# Patient Record
Sex: MALE | Race: WHITE | Employment: OTHER | URBAN - METROPOLITAN AREA
[De-identification: names, ages, dates, MRNs, and addresses within clinical notes are randomized per-mention and may not be internally consistent; named-entity substitution may affect disease eponyms.]

---

## 2018-04-26 PROBLEM — E66.01 MORBID OBESITY (HCC): Status: ACTIVE | Noted: 2018-04-26

## 2018-06-06 ENCOUNTER — HOSPITAL ENCOUNTER (OUTPATIENT)
Dept: PREADMISSION TESTING | Age: 58
Discharge: HOME | End: 2018-06-10
Payer: COMMERCIAL

## 2018-06-06 VITALS
WEIGHT: 315 LBS | TEMPERATURE: 97.5 F | OXYGEN SATURATION: 96 % | SYSTOLIC BLOOD PRESSURE: 144 MMHG | HEART RATE: 80 BPM | RESPIRATION RATE: 16 BRPM | BODY MASS INDEX: 37.19 KG/M2 | HEIGHT: 77 IN | DIASTOLIC BLOOD PRESSURE: 93 MMHG

## 2018-06-06 LAB
ABSOLUTE EOS #: 0.2 K/UL (ref 0–0.4)
ABSOLUTE IMMATURE GRANULOCYTE: ABNORMAL K/UL (ref 0–0.3)
ABSOLUTE LYMPH #: 3.1 K/UL (ref 1–4.8)
ABSOLUTE MONO #: 0.8 K/UL (ref 0.1–1.3)
ANION GAP SERPL CALCULATED.3IONS-SCNC: 12 MMOL/L (ref 9–17)
BASOPHILS # BLD: 0 % (ref 0–2)
BASOPHILS ABSOLUTE: 0 K/UL (ref 0–0.2)
BUN BLDV-MCNC: 14 MG/DL (ref 6–20)
BUN/CREAT BLD: ABNORMAL (ref 9–20)
CALCIUM SERPL-MCNC: 8.8 MG/DL (ref 8.6–10.4)
CHLORIDE BLD-SCNC: 101 MMOL/L (ref 98–107)
CO2: 26 MMOL/L (ref 20–31)
CREAT SERPL-MCNC: 0.76 MG/DL (ref 0.7–1.2)
DIFFERENTIAL TYPE: ABNORMAL
EKG ATRIAL RATE: 76 BPM
EKG P AXIS: -3 DEGREES
EKG P-R INTERVAL: 170 MS
EKG Q-T INTERVAL: 366 MS
EKG QRS DURATION: 94 MS
EKG QTC CALCULATION (BAZETT): 411 MS
EKG R AXIS: -2 DEGREES
EKG T AXIS: 16 DEGREES
EKG VENTRICULAR RATE: 76 BPM
EOSINOPHILS RELATIVE PERCENT: 2 % (ref 0–4)
GFR AFRICAN AMERICAN: >60 ML/MIN
GFR NON-AFRICAN AMERICAN: >60 ML/MIN
GFR SERPL CREATININE-BSD FRML MDRD: ABNORMAL ML/MIN/{1.73_M2}
GFR SERPL CREATININE-BSD FRML MDRD: ABNORMAL ML/MIN/{1.73_M2}
GLUCOSE BLD-MCNC: 108 MG/DL (ref 70–99)
HCT VFR BLD CALC: 41.1 % (ref 41–53)
HEMOGLOBIN: 14.2 G/DL (ref 13.5–17.5)
IMMATURE GRANULOCYTES: ABNORMAL %
LYMPHOCYTES # BLD: 31 % (ref 24–44)
MCH RBC QN AUTO: 30.4 PG (ref 26–34)
MCHC RBC AUTO-ENTMCNC: 34.4 G/DL (ref 31–37)
MCV RBC AUTO: 88.2 FL (ref 80–100)
MONOCYTES # BLD: 9 % (ref 1–7)
NRBC AUTOMATED: ABNORMAL PER 100 WBC
PDW BLD-RTO: 13.6 % (ref 11.5–14.9)
PLATELET # BLD: 246 K/UL (ref 150–450)
PLATELET ESTIMATE: ABNORMAL
PMV BLD AUTO: 8.9 FL (ref 6–12)
POTASSIUM SERPL-SCNC: 4.2 MMOL/L (ref 3.7–5.3)
RBC # BLD: 4.66 M/UL (ref 4.5–5.9)
RBC # BLD: ABNORMAL 10*6/UL
SEG NEUTROPHILS: 58 % (ref 36–66)
SEGMENTED NEUTROPHILS ABSOLUTE COUNT: 5.7 K/UL (ref 1.3–9.1)
SODIUM BLD-SCNC: 139 MMOL/L (ref 135–144)
WBC # BLD: 9.8 K/UL (ref 3.5–11)
WBC # BLD: ABNORMAL 10*3/UL

## 2018-06-06 PROCEDURE — 85025 COMPLETE CBC W/AUTO DIFF WBC: CPT

## 2018-06-06 PROCEDURE — 36415 COLL VENOUS BLD VENIPUNCTURE: CPT

## 2018-06-06 PROCEDURE — 80048 BASIC METABOLIC PNL TOTAL CA: CPT

## 2018-06-06 PROCEDURE — 93005 ELECTROCARDIOGRAM TRACING: CPT

## 2018-06-07 ENCOUNTER — ANESTHESIA EVENT (OUTPATIENT)
Dept: OPERATING ROOM | Age: 58
End: 2018-06-07
Payer: COMMERCIAL

## 2018-06-07 RX ORDER — LIDOCAINE HYDROCHLORIDE 10 MG/ML
1 INJECTION, SOLUTION EPIDURAL; INFILTRATION; INTRACAUDAL; PERINEURAL
Status: CANCELLED | OUTPATIENT
Start: 2018-06-07 | End: 2018-06-07

## 2018-06-07 RX ORDER — SODIUM CHLORIDE 0.9 % (FLUSH) 0.9 %
10 SYRINGE (ML) INJECTION PRN
Status: CANCELLED | OUTPATIENT
Start: 2018-06-07

## 2018-06-07 RX ORDER — SODIUM CHLORIDE 0.9 % (FLUSH) 0.9 %
10 SYRINGE (ML) INJECTION EVERY 12 HOURS SCHEDULED
Status: CANCELLED | OUTPATIENT
Start: 2018-06-07

## 2018-06-07 RX ORDER — SODIUM CHLORIDE 9 MG/ML
INJECTION, SOLUTION INTRAVENOUS CONTINUOUS
Status: CANCELLED | OUTPATIENT
Start: 2018-06-07

## 2018-06-13 ENCOUNTER — HOSPITAL ENCOUNTER (OUTPATIENT)
Age: 58
Setting detail: OUTPATIENT SURGERY
Discharge: HOME | End: 2018-06-13
Attending: SURGERY | Admitting: SURGERY
Payer: COMMERCIAL

## 2018-06-13 ENCOUNTER — ANESTHESIA (OUTPATIENT)
Dept: OPERATING ROOM | Age: 58
End: 2018-06-13
Payer: COMMERCIAL

## 2018-06-13 VITALS — TEMPERATURE: 97 F | OXYGEN SATURATION: 85 % | SYSTOLIC BLOOD PRESSURE: 124 MMHG | DIASTOLIC BLOOD PRESSURE: 77 MMHG

## 2018-06-13 VITALS
BODY MASS INDEX: 37.19 KG/M2 | SYSTOLIC BLOOD PRESSURE: 140 MMHG | DIASTOLIC BLOOD PRESSURE: 83 MMHG | WEIGHT: 315 LBS | HEART RATE: 75 BPM | TEMPERATURE: 97.2 F | RESPIRATION RATE: 12 BRPM | HEIGHT: 77 IN | OXYGEN SATURATION: 94 %

## 2018-06-13 DIAGNOSIS — K42.9 UMBILICAL HERNIA WITHOUT OBSTRUCTION AND WITHOUT GANGRENE: Primary | ICD-10-CM

## 2018-06-13 LAB — GLUCOSE BLD-MCNC: 125 MG/DL (ref 75–110)

## 2018-06-13 PROCEDURE — 6370000000 HC RX 637 (ALT 250 FOR IP): Performed by: ANESTHESIOLOGY

## 2018-06-13 PROCEDURE — 2720000010 HC SURG SUPPLY STERILE: Performed by: SURGERY

## 2018-06-13 PROCEDURE — 6360000002 HC RX W HCPCS

## 2018-06-13 PROCEDURE — 88307 TISSUE EXAM BY PATHOLOGIST: CPT

## 2018-06-13 PROCEDURE — 7100000000 HC PACU RECOVERY - FIRST 15 MIN: Performed by: SURGERY

## 2018-06-13 PROCEDURE — 3600000012 HC SURGERY LEVEL 2 ADDTL 15MIN: Performed by: SURGERY

## 2018-06-13 PROCEDURE — 88302 TISSUE EXAM BY PATHOLOGIST: CPT

## 2018-06-13 PROCEDURE — 2500000003 HC RX 250 WO HCPCS: Performed by: SURGERY

## 2018-06-13 PROCEDURE — 2580000003 HC RX 258: Performed by: ANESTHESIOLOGY

## 2018-06-13 PROCEDURE — 2500000003 HC RX 250 WO HCPCS: Performed by: ANESTHESIOLOGY

## 2018-06-13 PROCEDURE — 6360000002 HC RX W HCPCS: Performed by: ANESTHESIOLOGY

## 2018-06-13 PROCEDURE — 3700000001 HC ADD 15 MINUTES (ANESTHESIA): Performed by: SURGERY

## 2018-06-13 PROCEDURE — 82947 ASSAY GLUCOSE BLOOD QUANT: CPT

## 2018-06-13 PROCEDURE — 7100000001 HC PACU RECOVERY - ADDTL 15 MIN: Performed by: SURGERY

## 2018-06-13 PROCEDURE — 2700000000 HC OXYGEN THERAPY PER DAY

## 2018-06-13 PROCEDURE — 7100000031 HC ASPR PHASE II RECOVERY - ADDTL 15 MIN: Performed by: SURGERY

## 2018-06-13 PROCEDURE — 2500000003 HC RX 250 WO HCPCS

## 2018-06-13 PROCEDURE — A6402 STERILE GAUZE <= 16 SQ IN: HCPCS | Performed by: SURGERY

## 2018-06-13 PROCEDURE — 7100000030 HC ASPR PHASE II RECOVERY - FIRST 15 MIN: Performed by: SURGERY

## 2018-06-13 PROCEDURE — C1781 MESH (IMPLANTABLE): HCPCS | Performed by: SURGERY

## 2018-06-13 PROCEDURE — 3700000000 HC ANESTHESIA ATTENDED CARE: Performed by: SURGERY

## 2018-06-13 PROCEDURE — S0028 INJECTION, FAMOTIDINE, 20 MG: HCPCS | Performed by: ANESTHESIOLOGY

## 2018-06-13 PROCEDURE — 3600000002 HC SURGERY LEVEL 2 BASE: Performed by: SURGERY

## 2018-06-13 DEVICE — PATCH HERN M DIA2.5IN CIR W/ STRP SEPRA TECHNOLOGY ABSRB: Type: IMPLANTABLE DEVICE | Site: UMBILICAL | Status: FUNCTIONAL

## 2018-06-13 RX ORDER — DEXAMETHASONE SODIUM PHOSPHATE 4 MG/ML
INJECTION, SOLUTION INTRA-ARTICULAR; INTRALESIONAL; INTRAMUSCULAR; INTRAVENOUS; SOFT TISSUE PRN
Status: DISCONTINUED | OUTPATIENT
Start: 2018-06-13 | End: 2018-06-13 | Stop reason: SDUPTHER

## 2018-06-13 RX ORDER — DIPHENHYDRAMINE HYDROCHLORIDE 50 MG/ML
12.5 INJECTION INTRAMUSCULAR; INTRAVENOUS
Status: DISCONTINUED | OUTPATIENT
Start: 2018-06-13 | End: 2018-06-13 | Stop reason: HOSPADM

## 2018-06-13 RX ORDER — ONDANSETRON 2 MG/ML
4 INJECTION INTRAMUSCULAR; INTRAVENOUS
Status: COMPLETED | OUTPATIENT
Start: 2018-06-13 | End: 2018-06-13

## 2018-06-13 RX ORDER — OXYCODONE HYDROCHLORIDE AND ACETAMINOPHEN 5; 325 MG/1; MG/1
1 TABLET ORAL EVERY 6 HOURS PRN
Qty: 28 TABLET | Refills: 0 | Status: SHIPPED | OUTPATIENT
Start: 2018-06-13 | End: 2018-06-20

## 2018-06-13 RX ORDER — NEOSTIGMINE METHYLSULFATE 1 MG/ML
INJECTION, SOLUTION INTRAVENOUS PRN
Status: DISCONTINUED | OUTPATIENT
Start: 2018-06-13 | End: 2018-06-13 | Stop reason: SDUPTHER

## 2018-06-13 RX ORDER — CEPHALEXIN 500 MG/1
CAPSULE ORAL
Qty: 21 CAPSULE | Refills: 0 | Status: SHIPPED | OUTPATIENT
Start: 2018-06-13 | End: 2018-11-05

## 2018-06-13 RX ORDER — MEPERIDINE HYDROCHLORIDE 50 MG/ML
12.5 INJECTION INTRAMUSCULAR; INTRAVENOUS; SUBCUTANEOUS EVERY 5 MIN PRN
Status: DISCONTINUED | OUTPATIENT
Start: 2018-06-13 | End: 2018-06-13 | Stop reason: HOSPADM

## 2018-06-13 RX ORDER — MIDAZOLAM HYDROCHLORIDE 1 MG/ML
INJECTION INTRAMUSCULAR; INTRAVENOUS PRN
Status: DISCONTINUED | OUTPATIENT
Start: 2018-06-13 | End: 2018-06-13 | Stop reason: SDUPTHER

## 2018-06-13 RX ORDER — BUPIVACAINE HYDROCHLORIDE 2.5 MG/ML
INJECTION, SOLUTION EPIDURAL; INFILTRATION; INTRACAUDAL PRN
Status: DISCONTINUED | OUTPATIENT
Start: 2018-06-13 | End: 2018-06-13 | Stop reason: HOSPADM

## 2018-06-13 RX ORDER — PROPOFOL 10 MG/ML
INJECTION, EMULSION INTRAVENOUS PRN
Status: DISCONTINUED | OUTPATIENT
Start: 2018-06-13 | End: 2018-06-13 | Stop reason: SDUPTHER

## 2018-06-13 RX ORDER — ROCURONIUM BROMIDE 10 MG/ML
INJECTION, SOLUTION INTRAVENOUS PRN
Status: DISCONTINUED | OUTPATIENT
Start: 2018-06-13 | End: 2018-06-13 | Stop reason: SDUPTHER

## 2018-06-13 RX ORDER — FENTANYL CITRATE 50 UG/ML
25 INJECTION, SOLUTION INTRAMUSCULAR; INTRAVENOUS EVERY 5 MIN PRN
Status: DISCONTINUED | OUTPATIENT
Start: 2018-06-13 | End: 2018-06-13 | Stop reason: HOSPADM

## 2018-06-13 RX ORDER — CEFAZOLIN SODIUM 1 G/3ML
INJECTION, POWDER, FOR SOLUTION INTRAMUSCULAR; INTRAVENOUS PRN
Status: DISCONTINUED | OUTPATIENT
Start: 2018-06-13 | End: 2018-06-13 | Stop reason: SDUPTHER

## 2018-06-13 RX ORDER — HYDRALAZINE HYDROCHLORIDE 20 MG/ML
5 INJECTION INTRAMUSCULAR; INTRAVENOUS EVERY 10 MIN PRN
Status: DISCONTINUED | OUTPATIENT
Start: 2018-06-13 | End: 2018-06-13 | Stop reason: HOSPADM

## 2018-06-13 RX ORDER — CEFAZOLIN SODIUM 1 G/3ML
INJECTION, POWDER, FOR SOLUTION INTRAMUSCULAR; INTRAVENOUS
Status: DISCONTINUED
Start: 2018-06-13 | End: 2018-06-13 | Stop reason: HOSPADM

## 2018-06-13 RX ORDER — FENTANYL CITRATE 50 UG/ML
INJECTION, SOLUTION INTRAMUSCULAR; INTRAVENOUS PRN
Status: DISCONTINUED | OUTPATIENT
Start: 2018-06-13 | End: 2018-06-13 | Stop reason: SDUPTHER

## 2018-06-13 RX ORDER — SODIUM CHLORIDE 0.9 % (FLUSH) 0.9 %
10 SYRINGE (ML) INJECTION EVERY 12 HOURS SCHEDULED
Status: DISCONTINUED | OUTPATIENT
Start: 2018-06-13 | End: 2018-06-13 | Stop reason: HOSPADM

## 2018-06-13 RX ORDER — LABETALOL HYDROCHLORIDE 5 MG/ML
5 INJECTION, SOLUTION INTRAVENOUS EVERY 10 MIN PRN
Status: DISCONTINUED | OUTPATIENT
Start: 2018-06-13 | End: 2018-06-13 | Stop reason: HOSPADM

## 2018-06-13 RX ORDER — FENTANYL CITRATE 50 UG/ML
50 INJECTION, SOLUTION INTRAMUSCULAR; INTRAVENOUS EVERY 5 MIN PRN
Status: DISCONTINUED | OUTPATIENT
Start: 2018-06-13 | End: 2018-06-13 | Stop reason: HOSPADM

## 2018-06-13 RX ORDER — ONDANSETRON 4 MG/1
TABLET, FILM COATED ORAL
Qty: 20 TABLET | Refills: 0 | Status: SHIPPED | OUTPATIENT
Start: 2018-06-13 | End: 2018-11-05

## 2018-06-13 RX ORDER — METOCLOPRAMIDE HYDROCHLORIDE 5 MG/ML
10 INJECTION INTRAMUSCULAR; INTRAVENOUS ONCE
Status: COMPLETED | OUTPATIENT
Start: 2018-06-13 | End: 2018-06-13

## 2018-06-13 RX ORDER — SULFAMETHOXAZOLE AND TRIMETHOPRIM 800; 160 MG/1; MG/1
1 TABLET ORAL 2 TIMES DAILY
Qty: 20 TABLET | Refills: 0 | Status: SHIPPED | OUTPATIENT
Start: 2018-06-13 | End: 2018-06-23

## 2018-06-13 RX ORDER — METOCLOPRAMIDE HYDROCHLORIDE 5 MG/ML
10 INJECTION INTRAMUSCULAR; INTRAVENOUS
Status: DISCONTINUED | OUTPATIENT
Start: 2018-06-13 | End: 2018-06-13 | Stop reason: HOSPADM

## 2018-06-13 RX ORDER — SODIUM CHLORIDE 9 MG/ML
INJECTION, SOLUTION INTRAVENOUS CONTINUOUS
Status: DISCONTINUED | OUTPATIENT
Start: 2018-06-13 | End: 2018-06-13 | Stop reason: HOSPADM

## 2018-06-13 RX ORDER — SODIUM CHLORIDE 0.9 % (FLUSH) 0.9 %
10 SYRINGE (ML) INJECTION PRN
Status: DISCONTINUED | OUTPATIENT
Start: 2018-06-13 | End: 2018-06-13 | Stop reason: HOSPADM

## 2018-06-13 RX ORDER — HYDROCODONE BITARTRATE AND ACETAMINOPHEN 5; 325 MG/1; MG/1
2 TABLET ORAL PRN
Status: COMPLETED | OUTPATIENT
Start: 2018-06-13 | End: 2018-06-13

## 2018-06-13 RX ORDER — GLYCOPYRROLATE 1 MG/5 ML
SYRINGE (ML) INTRAVENOUS PRN
Status: DISCONTINUED | OUTPATIENT
Start: 2018-06-13 | End: 2018-06-13 | Stop reason: SDUPTHER

## 2018-06-13 RX ORDER — LIDOCAINE HYDROCHLORIDE 10 MG/ML
1 INJECTION, SOLUTION EPIDURAL; INFILTRATION; INTRACAUDAL; PERINEURAL
Status: DISCONTINUED | OUTPATIENT
Start: 2018-06-13 | End: 2018-06-13 | Stop reason: HOSPADM

## 2018-06-13 RX ORDER — HYDROCODONE BITARTRATE AND ACETAMINOPHEN 5; 325 MG/1; MG/1
1 TABLET ORAL PRN
Status: COMPLETED | OUTPATIENT
Start: 2018-06-13 | End: 2018-06-13

## 2018-06-13 RX ORDER — MORPHINE SULFATE 2 MG/ML
2 INJECTION, SOLUTION INTRAMUSCULAR; INTRAVENOUS EVERY 5 MIN PRN
Status: DISCONTINUED | OUTPATIENT
Start: 2018-06-13 | End: 2018-06-13 | Stop reason: HOSPADM

## 2018-06-13 RX ORDER — ONDANSETRON 2 MG/ML
INJECTION INTRAMUSCULAR; INTRAVENOUS PRN
Status: DISCONTINUED | OUTPATIENT
Start: 2018-06-13 | End: 2018-06-13 | Stop reason: SDUPTHER

## 2018-06-13 RX ADMIN — DEXAMETHASONE SODIUM PHOSPHATE 4 MG: 4 INJECTION, SOLUTION INTRAMUSCULAR; INTRAVENOUS at 14:37

## 2018-06-13 RX ADMIN — SODIUM CHLORIDE: 9 INJECTION, SOLUTION INTRAVENOUS at 13:17

## 2018-06-13 RX ADMIN — FENTANYL CITRATE 50 MCG: 50 INJECTION, SOLUTION INTRAMUSCULAR; INTRAVENOUS at 15:32

## 2018-06-13 RX ADMIN — ONDANSETRON 4 MG: 2 INJECTION INTRAMUSCULAR; INTRAVENOUS at 16:05

## 2018-06-13 RX ADMIN — METOCLOPRAMIDE 10 MG: 5 INJECTION, SOLUTION INTRAMUSCULAR; INTRAVENOUS at 13:53

## 2018-06-13 RX ADMIN — HYDROCODONE BITARTRATE AND ACETAMINOPHEN 2 TABLET: 5; 325 TABLET ORAL at 16:58

## 2018-06-13 RX ADMIN — PROPOFOL 200 MG: 10 INJECTION, EMULSION INTRAVENOUS at 14:24

## 2018-06-13 RX ADMIN — HYDROMORPHONE HYDROCHLORIDE 0.5 MG: 1 INJECTION, SOLUTION INTRAMUSCULAR; INTRAVENOUS; SUBCUTANEOUS at 16:12

## 2018-06-13 RX ADMIN — NEOSTIGMINE METHYLSULFATE 5 MG: 1 INJECTION, SOLUTION INTRAVENOUS at 15:23

## 2018-06-13 RX ADMIN — MIDAZOLAM 2 MG: 1 INJECTION INTRAMUSCULAR; INTRAVENOUS at 14:18

## 2018-06-13 RX ADMIN — HYDROMORPHONE HYDROCHLORIDE 0.5 MG: 1 INJECTION, SOLUTION INTRAMUSCULAR; INTRAVENOUS; SUBCUTANEOUS at 16:02

## 2018-06-13 RX ADMIN — ROCURONIUM BROMIDE 50 MG: 10 INJECTION INTRAVENOUS at 14:24

## 2018-06-13 RX ADMIN — FENTANYL CITRATE 100 MCG: 50 INJECTION, SOLUTION INTRAMUSCULAR; INTRAVENOUS at 14:47

## 2018-06-13 RX ADMIN — CEFAZOLIN 3000 MG: 1 INJECTION, POWDER, FOR SOLUTION INTRAMUSCULAR; INTRAVENOUS at 14:25

## 2018-06-13 RX ADMIN — Medication 0.8 MG: at 15:23

## 2018-06-13 RX ADMIN — ONDANSETRON 4 MG: 2 INJECTION INTRAMUSCULAR; INTRAVENOUS at 14:37

## 2018-06-13 RX ADMIN — FAMOTIDINE 20 MG: 10 INJECTION INTRAVENOUS at 13:56

## 2018-06-13 RX ADMIN — FENTANYL CITRATE 100 MCG: 50 INJECTION, SOLUTION INTRAMUSCULAR; INTRAVENOUS at 14:24

## 2018-06-13 RX ADMIN — ROCURONIUM BROMIDE 10 MG: 10 INJECTION INTRAVENOUS at 14:58

## 2018-06-13 RX ADMIN — SODIUM CHLORIDE: 9 INJECTION, SOLUTION INTRAVENOUS at 14:53

## 2018-06-13 ASSESSMENT — PULMONARY FUNCTION TESTS
PIF_VALUE: 29
PIF_VALUE: 28
PIF_VALUE: 27
PIF_VALUE: 29
PIF_VALUE: 31
PIF_VALUE: 29
PIF_VALUE: 0
PIF_VALUE: 0
PIF_VALUE: 29
PIF_VALUE: 1
PIF_VALUE: 1
PIF_VALUE: 30
PIF_VALUE: 29
PIF_VALUE: 33
PIF_VALUE: 29
PIF_VALUE: 1
PIF_VALUE: 28
PIF_VALUE: 29
PIF_VALUE: 29
PIF_VALUE: 28
PIF_VALUE: 30
PIF_VALUE: 28
PIF_VALUE: 30
PIF_VALUE: 29
PIF_VALUE: 0
PIF_VALUE: 28
PIF_VALUE: 25
PIF_VALUE: 28
PIF_VALUE: 30
PIF_VALUE: 28
PIF_VALUE: 29
PIF_VALUE: 28
PIF_VALUE: 29
PIF_VALUE: 2
PIF_VALUE: 29
PIF_VALUE: 3
PIF_VALUE: 27
PIF_VALUE: 29
PIF_VALUE: 28
PIF_VALUE: 27
PIF_VALUE: 29
PIF_VALUE: 28
PIF_VALUE: 30
PIF_VALUE: 28
PIF_VALUE: 1
PIF_VALUE: 29
PIF_VALUE: 29
PIF_VALUE: 1
PIF_VALUE: 29
PIF_VALUE: 3
PIF_VALUE: 29
PIF_VALUE: 25
PIF_VALUE: 29
PIF_VALUE: 1
PIF_VALUE: 28
PIF_VALUE: 35
PIF_VALUE: 29
PIF_VALUE: 26
PIF_VALUE: 28
PIF_VALUE: 29
PIF_VALUE: 29
PIF_VALUE: 1
PIF_VALUE: 29
PIF_VALUE: 3
PIF_VALUE: 2
PIF_VALUE: 3
PIF_VALUE: 31
PIF_VALUE: 27
PIF_VALUE: 29
PIF_VALUE: 28

## 2018-06-13 ASSESSMENT — PAIN DESCRIPTION - PAIN TYPE
TYPE: SURGICAL PAIN
TYPE: SURGICAL PAIN

## 2018-06-13 ASSESSMENT — PAIN SCALES - GENERAL
PAINLEVEL_OUTOF10: 7
PAINLEVEL_OUTOF10: 5
PAINLEVEL_OUTOF10: 5
PAINLEVEL_OUTOF10: 7
PAINLEVEL_OUTOF10: 7
PAINLEVEL_OUTOF10: 8
PAINLEVEL_OUTOF10: 0

## 2018-06-13 ASSESSMENT — PAIN DESCRIPTION - LOCATION
LOCATION: ABDOMEN

## 2018-06-13 ASSESSMENT — PAIN - FUNCTIONAL ASSESSMENT: PAIN_FUNCTIONAL_ASSESSMENT: 0-10

## 2018-06-13 ASSESSMENT — PAIN DESCRIPTION - DESCRIPTORS
DESCRIPTORS: ACHING
DESCRIPTORS: ACHING;BURNING

## 2018-06-15 LAB — SURGICAL PATHOLOGY REPORT: NORMAL

## 2018-11-05 PROBLEM — E66.9 DIABETES MELLITUS TYPE 2 IN OBESE (HCC): Status: ACTIVE | Noted: 2018-11-05

## 2018-11-05 PROBLEM — E11.69 DIABETES MELLITUS TYPE 2 IN OBESE (HCC): Status: ACTIVE | Noted: 2018-11-05

## 2018-12-26 DIAGNOSIS — E66.9 DIABETES MELLITUS TYPE 2 IN OBESE (HCC): ICD-10-CM

## 2018-12-26 DIAGNOSIS — E11.69 DIABETES MELLITUS TYPE 2 IN OBESE (HCC): ICD-10-CM

## 2018-12-26 RX ORDER — LANCETS 30 GAUGE
1 EACH MISCELLANEOUS 2 TIMES DAILY
Qty: 100 EACH | Refills: 5 | Status: SHIPPED | OUTPATIENT
Start: 2018-12-26 | End: 2018-12-26 | Stop reason: SDUPTHER

## 2018-12-26 RX ORDER — LANCETS 30 GAUGE
1 EACH MISCELLANEOUS 2 TIMES DAILY
Qty: 300 EACH | Refills: 3 | Status: SHIPPED | OUTPATIENT
Start: 2018-12-26 | End: 2020-12-10

## 2019-05-07 ENCOUNTER — OFFICE VISIT (OUTPATIENT)
Dept: PRIMARY CARE CLINIC | Age: 59
End: 2019-05-07
Payer: COMMERCIAL

## 2019-05-07 VITALS
HEART RATE: 78 BPM | WEIGHT: 315 LBS | BODY MASS INDEX: 37.19 KG/M2 | SYSTOLIC BLOOD PRESSURE: 134 MMHG | HEIGHT: 77 IN | OXYGEN SATURATION: 96 % | DIASTOLIC BLOOD PRESSURE: 86 MMHG

## 2019-05-07 DIAGNOSIS — Z23 NEED FOR VIRAL IMMUNIZATION: ICD-10-CM

## 2019-05-07 DIAGNOSIS — E66.9 DIABETES MELLITUS TYPE 2 IN OBESE (HCC): Primary | ICD-10-CM

## 2019-05-07 DIAGNOSIS — J20.9 ACUTE BRONCHITIS, UNSPECIFIED ORGANISM: ICD-10-CM

## 2019-05-07 DIAGNOSIS — C67.8 MALIGNANT NEOPLASM OF OVERLAPPING SITES OF BLADDER (HCC): ICD-10-CM

## 2019-05-07 DIAGNOSIS — E11.69 DIABETES MELLITUS TYPE 2 IN OBESE (HCC): Primary | ICD-10-CM

## 2019-05-07 LAB — HBA1C MFR BLD: 6.1 %

## 2019-05-07 PROCEDURE — 99213 OFFICE O/P EST LOW 20 MIN: CPT | Performed by: FAMILY MEDICINE

## 2019-05-07 PROCEDURE — 90715 TDAP VACCINE 7 YRS/> IM: CPT | Performed by: FAMILY MEDICINE

## 2019-05-07 PROCEDURE — 83036 HEMOGLOBIN GLYCOSYLATED A1C: CPT | Performed by: FAMILY MEDICINE

## 2019-05-07 PROCEDURE — 90471 IMMUNIZATION ADMIN: CPT | Performed by: FAMILY MEDICINE

## 2019-05-07 RX ORDER — ALBUTEROL SULFATE 90 UG/1
2 AEROSOL, METERED RESPIRATORY (INHALATION) EVERY 6 HOURS PRN
Qty: 1 INHALER | Refills: 5 | Status: SHIPPED | OUTPATIENT
Start: 2019-05-07 | End: 2021-10-28 | Stop reason: SDUPTHER

## 2019-05-07 ASSESSMENT — ENCOUNTER SYMPTOMS
SHORTNESS OF BREATH: 0
RHINORRHEA: 0
WHEEZING: 0
DIARRHEA: 0
ABDOMINAL PAIN: 0
SORE THROAT: 0
VOMITING: 0
EYE DISCHARGE: 0
EYE REDNESS: 0
NAUSEA: 0
COUGH: 0

## 2019-05-07 ASSESSMENT — PATIENT HEALTH QUESTIONNAIRE - PHQ9
SUM OF ALL RESPONSES TO PHQ QUESTIONS 1-9: 0
SUM OF ALL RESPONSES TO PHQ QUESTIONS 1-9: 0
2. FEELING DOWN, DEPRESSED OR HOPELESS: 0
1. LITTLE INTEREST OR PLEASURE IN DOING THINGS: 0
SUM OF ALL RESPONSES TO PHQ9 QUESTIONS 1 & 2: 0

## 2019-05-07 NOTE — PROGRESS NOTES
 Alcohol use: Yes     Types: 1 Cans of beer per week     Comment: occasional      Current Outpatient Medications   Medication Sig Dispense Refill    Coenzyme Q10 (CO Q 10) 100 MG CAPS Take by mouth      albuterol sulfate HFA (PROAIR HFA) 108 (90 Base) MCG/ACT inhaler Inhale 2 puffs into the lungs every 6 hours as needed for Wheezing or Shortness of Breath 1 Inhaler 5    Lancets MISC 1 each by Does not apply route 2 times daily 300 each 3    carvedilol (COREG) 25 MG tablet Take 1 tablet by mouth 2 times daily 180 tablet 3    metFORMIN (GLUCOPHAGE) 500 MG tablet Take 1 tablet by mouth daily (with breakfast) 90 tablet 3    aspirin 81 MG tablet Take 81 mg by mouth daily.  Multiple Vitamins-Minerals (MULTIVITAMIN & MINERAL PO) Take 1 tablet by mouth daily. No current facility-administered medications for this visit. No Known Allergies    Health Maintenance   Topic Date Due    Diabetic retinal exam  02/02/1969    HIV screen  02/02/1974    Shingles Vaccine (1 of 2) 02/02/2009    Lipid screen  04/27/2019    Potassium monitoring  06/06/2019    Creatinine monitoring  06/06/2019    Diabetic foot exam  11/05/2019    Diabetic microalbuminuria test  11/05/2019    A1C test (Diabetic or Prediabetic)  05/07/2020    Colon cancer screen colonoscopy  10/13/2026    DTaP/Tdap/Td vaccine (4 - Td) 05/07/2029    Flu vaccine  Completed    Pneumococcal 0-64 years Vaccine  Completed    Hepatitis C screen  Completed       Subjective:      Review of Systems   Constitutional: Negative for chills and fever. HENT: Negative for rhinorrhea and sore throat. Eyes: Negative for discharge and redness. Respiratory: Negative for cough, shortness of breath and wheezing. Cardiovascular: Negative for chest pain and palpitations. Gastrointestinal: Negative for abdominal pain, diarrhea, nausea and vomiting. Genitourinary: Negative for dysuria and frequency.    Musculoskeletal: Negative for arthralgias and Referral Priority:   Routine     Referral Type:   Eval and Treat     Referral Reason:   Specialty Services Required     Referred to Provider:   Josiane Cardenas MD     Requested Specialty:   Urology     Number of Visits Requested:   1    POCT glycosylated hemoglobin (Hb A1C)    [place] COLLECTION CAPILLARY BLOOD SPECIMEN     Orders Placed This Encounter   Medications    albuterol sulfate HFA (PROAIR HFA) 108 (90 Base) MCG/ACT inhaler     Sig: Inhale 2 puffs into the lungs every 6 hours as needed for Wheezing or Shortness of Breath     Dispense:  1 Inhaler     Refill:  5       Patient given educationalmaterials - see patient instructions. Discussed use, benefit, and side effectsof prescribed medications. All patient questions answered. Pt voiced understanding. Reviewed health maintenance. Instructed to continue current medications, diet andexercise. Patient agreed with treatment plan. Follow up as directed.      Electronicallysigned by Nini Perez MD on 5/7/2019 at 5:00 PM

## 2019-08-22 DIAGNOSIS — I10 ESSENTIAL HYPERTENSION: ICD-10-CM

## 2019-08-23 RX ORDER — CARVEDILOL 25 MG/1
TABLET ORAL
Qty: 60 TABLET | Refills: 3 | Status: SHIPPED | OUTPATIENT
Start: 2019-08-23 | End: 2019-12-23

## 2019-08-27 ENCOUNTER — HOSPITAL ENCOUNTER (OUTPATIENT)
Age: 59
Setting detail: OUTPATIENT SURGERY
Discharge: HOME | End: 2019-08-27
Attending: UROLOGY | Admitting: UROLOGY
Payer: COMMERCIAL

## 2019-08-27 VITALS
OXYGEN SATURATION: 97 % | WEIGHT: 315 LBS | HEIGHT: 78 IN | SYSTOLIC BLOOD PRESSURE: 139 MMHG | RESPIRATION RATE: 14 BRPM | BODY MASS INDEX: 36.45 KG/M2 | HEART RATE: 65 BPM | TEMPERATURE: 98 F | DIASTOLIC BLOOD PRESSURE: 83 MMHG

## 2019-08-27 LAB
BILIRUBIN URINE: NEGATIVE
COLOR: YELLOW
COMMENT UA: NORMAL
GLUCOSE BLD-MCNC: 126 MG/DL (ref 75–110)
GLUCOSE URINE: NEGATIVE
KETONES, URINE: NEGATIVE
LEUKOCYTE ESTERASE, URINE: NEGATIVE
NITRITE, URINE: NEGATIVE
PH UA: 6 (ref 5–8)
PROTEIN UA: NEGATIVE
SPECIFIC GRAVITY UA: 1.02 (ref 1–1.03)
TURBIDITY: CLEAR
URINE HGB: NEGATIVE
UROBILINOGEN, URINE: NORMAL

## 2019-08-27 PROCEDURE — 3600000002 HC SURGERY LEVEL 2 BASE: Performed by: UROLOGY

## 2019-08-27 PROCEDURE — 2709999900 HC NON-CHARGEABLE SUPPLY: Performed by: UROLOGY

## 2019-08-27 PROCEDURE — 81003 URINALYSIS AUTO W/O SCOPE: CPT

## 2019-08-27 PROCEDURE — 7100000010 HC PHASE II RECOVERY - FIRST 15 MIN: Performed by: UROLOGY

## 2019-08-27 PROCEDURE — 2580000003 HC RX 258: Performed by: UROLOGY

## 2019-08-27 PROCEDURE — 3600000012 HC SURGERY LEVEL 2 ADDTL 15MIN: Performed by: UROLOGY

## 2019-08-27 PROCEDURE — 82947 ASSAY GLUCOSE BLOOD QUANT: CPT

## 2019-08-27 PROCEDURE — 87086 URINE CULTURE/COLONY COUNT: CPT

## 2019-08-27 PROCEDURE — 6370000000 HC RX 637 (ALT 250 FOR IP): Performed by: UROLOGY

## 2019-08-27 PROCEDURE — 7100000011 HC PHASE II RECOVERY - ADDTL 15 MIN: Performed by: UROLOGY

## 2019-08-27 RX ORDER — CEPHALEXIN 500 MG/1
500 CAPSULE ORAL 3 TIMES DAILY
Qty: 15 CAPSULE | Refills: 0 | Status: SHIPPED | OUTPATIENT
Start: 2019-08-27 | End: 2019-09-01

## 2019-08-27 RX ORDER — MAGNESIUM HYDROXIDE 1200 MG/15ML
LIQUID ORAL PRN
Status: DISCONTINUED | OUTPATIENT
Start: 2019-08-27 | End: 2019-08-27 | Stop reason: ALTCHOICE

## 2019-08-27 ASSESSMENT — PAIN - FUNCTIONAL ASSESSMENT: PAIN_FUNCTIONAL_ASSESSMENT: 0-10

## 2019-08-27 ASSESSMENT — PAIN SCALES - GENERAL: PAINLEVEL_OUTOF10: 0

## 2019-08-27 NOTE — H&P
Last attempt to quit: 2001     Years since quittin.5    Smokeless tobacco: Former User     Quit date: 2004   Substance and Sexual Activity    Alcohol use: Yes     Types: 1 Cans of beer per week     Comment: occasional    Drug use: No    Sexual activity: None   Lifestyle    Physical activity:     Days per week: None     Minutes per session: None    Stress: None   Relationships    Social connections:     Talks on phone: None     Gets together: None     Attends Confucianist service: None     Active member of club or organization: None     Attends meetings of clubs or organizations: None     Relationship status: None    Intimate partner violence:     Fear of current or ex partner: None     Emotionally abused: None     Physically abused: None     Forced sexual activity: None   Other Topics Concern    None   Social History Narrative    None           REVIEW OF SYSTEMS      No Known Allergies    No current facility-administered medications on file prior to encounter. Current Outpatient Medications on File Prior to Encounter   Medication Sig Dispense Refill    metFORMIN (GLUCOPHAGE) 500 MG tablet TAKE 1 TABLET BY MOUTH IN THE MORNING WITH BREAKFAST 90 tablet 3    albuterol sulfate HFA (PROAIR HFA) 108 (90 Base) MCG/ACT inhaler Inhale 2 puffs into the lungs every 6 hours as needed for Wheezing or Shortness of Breath 1 Inhaler 5    aspirin 81 MG tablet Take 81 mg by mouth daily.  Multiple Vitamins-Minerals (MULTIVITAMIN & MINERAL PO) Take 1 tablet by mouth daily.  Coenzyme Q10 (CO Q 10) 100 MG CAPS Take by mouth      Lancets MISC 1 each by Does not apply route 2 times daily 300 each 3       Negative except for what is mentioned in the HPI. GENERAL PHYSICAL EXAM     Vitals: /83   Pulse 67   Temp 97.9 °F (36.6 °C) (Oral)   Resp 16   Ht 6' 6\" (1.981 m)   Wt (!) 397 lb (180.1 kg)   SpO2 95%   BMI 45.88 kg/m²  Body mass index is 45.88 kg/m².      GENERAL APPEARANCE:   Fabián LANDERS

## 2019-08-27 NOTE — OP NOTE
[address]    Operative Note    Fabián Carrera  YOB: 1960  [id number]      Pre-operative Diagnosis: History of bladder cancer, prostate hypertrophy    Post-operative Diagnosis: Same    Procedure: Cystoscopy urethral dilatation    Anesthesia: Local    Surgeons/Assistants: Dr Austin Teixeira    Estimated Blood Loss: None    Complications: None    Specimens: Was Obtained: Urine    Indications: 59-year-old male with history of bladder cancer prior evaluation 5 years ago, presents for cystoscopy, patient also has a history of prostate hypertrophy urinary symptoms of prostatism    Operative Findings: Patient was brought to the operating room, positioned in supine, proper patient identification and procedure identification, prepping and draping in the usual sterile manner. We entered the bladder with the cystoscope, urethroscopy examination of the prostate demonstrates lateral lobe hypertrophy with visual occlusion of the bladder outlet into the bladder was carefully examined, area of previous tumor were carefully examined, there was no evidence of bladder tumor recurrence. Trigone is normal, ureteral orifices effluxing clear urine. The remainder of the bladder evaluation was unremarkable. At the completion the bladder was emptied the scope removed. We used the [employer] we dilated the urethra and the prostate fossa through size 25 [place], the patient has responded in the past urethral dilatation.   Has helped improve urinary flow    Recommendations follow-up visit will be scheduled at the office, urine specimen was sent for UroVysion FISH test, patient advised to call the office regarding UroVysion FISH test        Electronically signed by Matias Kowalski MD on 8/27/2019 at 5:18 AM

## 2019-08-28 LAB
CULTURE: NO GROWTH
Lab: NORMAL
SPECIMEN DESCRIPTION: NORMAL

## 2019-09-10 ENCOUNTER — TELEPHONE (OUTPATIENT)
Dept: PRIMARY CARE CLINIC | Age: 59
End: 2019-09-10

## 2019-12-22 DIAGNOSIS — I10 ESSENTIAL HYPERTENSION: ICD-10-CM

## 2019-12-23 RX ORDER — CARVEDILOL 25 MG/1
TABLET ORAL
Qty: 60 TABLET | Refills: 2 | Status: SHIPPED | OUTPATIENT
Start: 2019-12-23 | End: 2020-02-24

## 2020-04-13 ENCOUNTER — TELEPHONE (OUTPATIENT)
Dept: PRIMARY CARE CLINIC | Age: 60
End: 2020-04-13

## 2020-04-13 RX ORDER — CEPHALEXIN 500 MG/1
500 CAPSULE ORAL 4 TIMES DAILY
Qty: 40 CAPSULE | Refills: 0 | Status: SHIPPED | OUTPATIENT
Start: 2020-04-13 | End: 2020-04-23

## 2020-04-13 NOTE — TELEPHONE ENCOUNTER
Patient caught his leg on a lift kika and caused a wound in right leg one week ago. He has been having it cleaned with betadine and placing neosporin and butterfly band aid and larger band aid on it daily. Wound base is full of slough. Surrounding area is warm, erythema, and tender. Keflex sent in for cellulitis.

## 2020-06-03 ENCOUNTER — OFFICE VISIT (OUTPATIENT)
Dept: PRIMARY CARE CLINIC | Age: 60
End: 2020-06-03
Payer: COMMERCIAL

## 2020-06-03 VITALS
WEIGHT: 315 LBS | SYSTOLIC BLOOD PRESSURE: 136 MMHG | HEART RATE: 86 BPM | DIASTOLIC BLOOD PRESSURE: 84 MMHG | TEMPERATURE: 97.9 F | HEIGHT: 78 IN | BODY MASS INDEX: 36.45 KG/M2 | OXYGEN SATURATION: 95 %

## 2020-06-03 PROBLEM — E11.42 DIABETIC POLYNEUROPATHY ASSOCIATED WITH TYPE 2 DIABETES MELLITUS (HCC): Status: ACTIVE | Noted: 2020-06-03

## 2020-06-03 PROBLEM — R60.0 LOCALIZED EDEMA: Status: ACTIVE | Noted: 2020-06-03

## 2020-06-03 LAB
CREATININE URINE POCT: ABNORMAL
HBA1C MFR BLD: 6.5 %
MICROALBUMIN/CREAT 24H UR: ABNORMAL MG/G{CREAT}
MICROALBUMIN/CREAT UR-RTO: ABNORMAL

## 2020-06-03 PROCEDURE — 99214 OFFICE O/P EST MOD 30 MIN: CPT | Performed by: FAMILY MEDICINE

## 2020-06-03 PROCEDURE — 82044 UR ALBUMIN SEMIQUANTITATIVE: CPT | Performed by: FAMILY MEDICINE

## 2020-06-03 PROCEDURE — 83036 HEMOGLOBIN GLYCOSYLATED A1C: CPT | Performed by: FAMILY MEDICINE

## 2020-06-03 RX ORDER — CARVEDILOL 25 MG/1
25 TABLET ORAL 2 TIMES DAILY WITH MEALS
Qty: 180 TABLET | Refills: 3 | Status: SHIPPED | OUTPATIENT
Start: 2020-06-03 | End: 2021-06-25 | Stop reason: SDUPTHER

## 2020-06-03 RX ORDER — HYDROCHLOROTHIAZIDE 25 MG/1
25 TABLET ORAL EVERY MORNING
Qty: 90 TABLET | Refills: 3 | Status: SHIPPED | OUTPATIENT
Start: 2020-06-03 | End: 2020-06-19 | Stop reason: SDUPTHER

## 2020-06-03 ASSESSMENT — ENCOUNTER SYMPTOMS
SORE THROAT: 0
DIARRHEA: 0
WHEEZING: 0
VOMITING: 0
ABDOMINAL PAIN: 0
RHINORRHEA: 0
EYE REDNESS: 0
COUGH: 0
NAUSEA: 0
SHORTNESS OF BREATH: 0
EYE DISCHARGE: 0

## 2020-06-03 NOTE — PROGRESS NOTES
[facility] PRIMARY [facility]ARE  [address] [address]  Dept: [phone]    Fabián Beal is a 63 y.o. male who presents today for his medical conditions/complaintsas noted below. Chief Complaint   Patient presents with    Medication Check       HPI:     HPI  Patient here for medication check. States blood sugars been running in the 100 range. Denies any lightheadedness or dizziness denies any chest pain. Patient with an open wound on his left lower leg. States is been there for approximately 3 months but has not resolved. Patient denies any hematuria. No chest pain or shortness of breath. Patient states trying to lose weight but has not been successful.     LDL Calculated (mg/dL)   Date Value   04/27/2018 104   09/23/2016 103       (goal LDL is <100)   AST (U/L)   Date Value   04/27/2018 31     ALT (U/L)   Date Value   04/27/2018 48     BUN (mg/dL)   Date Value   06/06/2018 14     BP Readings from Last 3 Encounters:   06/03/20 136/84   08/27/19 139/83   05/07/19 134/86          (goal 120/80)    Past Medical History:   Diagnosis Date    Arthritis     Cancer ([address] 2012    bladder    Depression     Diabetes mellitus ([address]     borderline    [place] (hard of hearing)     bilateral hearing aids    Hypertension     Obesity     Umbilical hernia     Wears glasses       Past Surgical History:   Procedure Laterality Date    COLONOSCOPY  2017    normal    CYSTOSCOPY  5/21/14    several    CYSTOSCOPY  8/21/14    CYSTOSCOPY  11-21-14    CYSTOSCOPY N/A 8/27/2019    CYSTOSCOPY URETHRAL DILATATION performed by Viridiana Ruby MD at [facility] 25 QLCC,0+Y/W,[id number] N/A 6/13/2018    HERNIA INCARCERATED UMBILICAL REPAIR W/MESH performed by Jacey Beatty MD at [address]       Family History   Problem Relation Age of Onset    Diabetes Father     Coronary Art Dis Father     Lung Cancer Father     Diabetes Mother     Hypertension Mother    Chasidy Moreno vaccine (4 - Td) 05/07/2029    Pneumococcal 0-64 years Vaccine  Completed    Hepatitis C screen  Completed    Hepatitis A vaccine  Aged Out    Hib vaccine  Aged Out    Meningococcal (ACWY) vaccine  Aged Out       Subjective:      Review of Systems   Constitutional: Negative for chills and fever. HENT: Negative for rhinorrhea and sore throat. Eyes: Negative for discharge and redness. Respiratory: Negative for cough, shortness of breath and wheezing. Cardiovascular: Negative for chest pain and palpitations. Gastrointestinal: Negative for abdominal pain, diarrhea, nausea and vomiting. Genitourinary: Negative for dysuria and frequency. Musculoskeletal: Negative for arthralgias and myalgias. Neurological: Negative for dizziness, light-headedness and headaches. Psychiatric/Behavioral: Negative for sleep disturbance. Objective:     /84   Pulse 86   Temp 97.9 °F (36.6 °C)   Ht 6' 6\" (1.981 m)   Wt (!) 406 lb (184.2 kg)   SpO2 95%   BMI 46.92 kg/m²   Physical Exam  Vitals signs and nursing note reviewed. Constitutional:       General: He is not in acute distress. Appearance: He is well-developed. HENT:      Head: Normocephalic and atraumatic. Right Ear: External ear normal.      Left Ear: External ear normal.   Eyes:      General: No scleral icterus. Right eye: No discharge. Left eye: No discharge. Conjunctiva/sclera: Conjunctivae normal.      Pupils: Pupils are equal, round, and reactive to light. Neck:      Thyroid: No thyromegaly. Trachea: No tracheal deviation. Cardiovascular:      Rate and Rhythm: Normal rate and regular rhythm. Heart sounds: Normal heart sounds. Comments: No carotid bruits  Pulmonary:      Effort: Pulmonary effort is normal. No respiratory distress. Breath sounds: Normal breath sounds. No wheezing. Musculoskeletal:      Right lower leg: Edema present. Left lower leg: Edema present.       Comments: Status:   Future     Standing Expiration Date:   9/3/2020    Hepatic Function Panel     Standing Status:   Future     Standing Expiration Date:   9/3/2020    Psa screening     Standing Status:   Future     Standing Expiration Date:   9/3/2020    TSH     Standing Status:   Future     Standing Expiration Date:   6/3/2021    POCT glycosylated hemoglobin (Hb A1C)    POCT microalbumin    HM DIABETES FOOT EXAM    [place] COLLECTION CAPILLARY BLOOD SPECIMEN     Orders Placed This Encounter   Medications    Wound Dressings (MEDIHONEY WOUND/BURN DRESSING) GEL gel     Sig: Apply 1 each topically daily     Dispense:  44 mL     Refill:  1    hydroCHLOROthiazide (HYDRODIURIL) 25 MG tablet     Sig: Take 1 tablet by mouth every morning     Dispense:  90 tablet     Refill:  3    metFORMIN (GLUCOPHAGE) 500 MG tablet     Sig: Take 1 tablet by mouth daily (with breakfast)     Dispense:  90 tablet     Refill:  1    carvedilol (COREG) 25 MG tablet     Sig: Take 1 tablet by mouth 2 times daily (with meals)     Dispense:  180 tablet     Refill:  3       Patient given educationalmaterials - see patient instructions. Discussed use, benefit, and side effectsof prescribed medications. All patient questions answered. Pt voiced understanding. Reviewed health maintenance. Instructed to continue current medications, diet andexercise. Patient agreed with treatment plan. Follow up as directed.      Electronicallysigned by Jordan Kilpatrick MD on 6/3/2020 at 12:02 PM

## 2020-06-08 ENCOUNTER — TELEPHONE (OUTPATIENT)
Dept: PRIMARY CARE CLINIC | Age: 60
End: 2020-06-08

## 2020-06-08 RX ORDER — IBUPROFEN 800 MG/1
800 TABLET ORAL EVERY 8 HOURS PRN
Qty: 60 TABLET | Refills: 0 | Status: SHIPPED | OUTPATIENT
Start: 2020-06-08 | End: 2020-11-20

## 2020-06-08 NOTE — TELEPHONE ENCOUNTER
Pt's wife calling for pt. Pt called her from work c/o back pain that has been bothering him off and on for several weeks. Pt just here on 6/3 for an appt, but it wasn't really bothering him too much then. 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 Asking if there is something you would send in for the pain to [facility]. listed?

## 2020-06-11 LAB
ALBUMIN SERPL-MCNC: 3.9 G/DL (ref 3.2–5.3)
ALK PHOSPHATASE: 65 U/L (ref 39–130)
ALT SERPL-CCNC: 42 U/L (ref 0–40)
ANION GAP SERPL CALCULATED.3IONS-SCNC: 7 MMOL/L (ref 5–15)
AST SERPL-CCNC: 32 U/L (ref 0–41)
BILIRUB SERPL-MCNC: 0.6 MG/DL (ref 0.3–1.2)
BILIRUBIN DIRECT: 0.1 MG/DL (ref 0–0.4)
BUN BLDV-MCNC: 14 MG/DL (ref 5–27)
CALCIUM SERPL-MCNC: 9 MG/DL (ref 8.5–10.5)
CHLORIDE BLD-SCNC: 105 MMOL/L (ref 98–109)
CHOLESTEROL/HDL RATIO: 3.7 (ref 1–5)
CHOLESTEROL: 165 MG/DL (ref 150–200)
CO2: 28 MMOL/L (ref 22–32)
CREAT SERPL-MCNC: 0.76 MG/DL (ref 0.6–1.3)
EGFR AFRICAN AMERICAN: >60 ML/MIN/1.73SQ.M
EGFR IF NONAFRICAN AMERICAN: >60 ML/MIN/1.73SQ.M
GLUCOSE: 129 MG/DL (ref 65–99)
HDLC SERPL-MCNC: 45 MG/DL
LDL CHOLESTEROL CALCULATED: 101 MG/DL
LDL/HDL RATIO: 2.2
POTASSIUM SERPL-SCNC: 4.4 MMOL/L (ref 3.5–5)
PSA, ULTRASENSITIVE: 0.5 NG/ML (ref 0–4)
SODIUM BLD-SCNC: 140 MMOL/L (ref 134–146)
TOTAL PROTEIN: 7.2 G/DL (ref 6–8)
TRIGL SERPL-MCNC: 93 MG/DL (ref 27–150)
TSH SERPL DL<=0.05 MIU/L-ACNC: 1.84 UIU/ML (ref 0.49–4.67)
VLDLC SERPL CALC-MCNC: 19 MG/DL (ref 0–30)

## 2020-06-19 RX ORDER — HYDROCHLOROTHIAZIDE 25 MG/1
25 TABLET ORAL EVERY MORNING
Qty: 90 TABLET | Refills: 3 | Status: SHIPPED | OUTPATIENT
Start: 2020-06-19 | End: 2020-12-10

## 2020-08-17 ENCOUNTER — HOSPITAL ENCOUNTER (OUTPATIENT)
Dept: PREADMISSION TESTING | Age: 60
Setting detail: SPECIMEN
Discharge: HOME | End: 2020-08-21
Payer: COMMERCIAL

## 2020-08-21 ENCOUNTER — HOSPITAL ENCOUNTER (OUTPATIENT)
Age: 60
Setting detail: OUTPATIENT SURGERY
Discharge: HOME | End: 2020-08-21
Attending: UROLOGY | Admitting: UROLOGY
Payer: COMMERCIAL

## 2020-08-21 VITALS
OXYGEN SATURATION: 95 % | RESPIRATION RATE: 17 BRPM | WEIGHT: 315 LBS | HEART RATE: 67 BPM | DIASTOLIC BLOOD PRESSURE: 85 MMHG | SYSTOLIC BLOOD PRESSURE: 138 MMHG | BODY MASS INDEX: 36.45 KG/M2 | HEIGHT: 78 IN | TEMPERATURE: 97.1 F

## 2020-08-21 PROCEDURE — 88120 CYTP URNE 3-5 PROBES EA SPEC: CPT

## 2020-08-21 PROCEDURE — 87086 URINE CULTURE/COLONY COUNT: CPT

## 2020-08-21 PROCEDURE — 7100000010 HC PHASE II RECOVERY - FIRST 15 MIN: Performed by: UROLOGY

## 2020-08-21 PROCEDURE — 3600000012 HC SURGERY LEVEL 2 ADDTL 15MIN: Performed by: UROLOGY

## 2020-08-21 PROCEDURE — 3600000002 HC SURGERY LEVEL 2 BASE: Performed by: UROLOGY

## 2020-08-21 PROCEDURE — 2709999900 HC NON-CHARGEABLE SUPPLY: Performed by: UROLOGY

## 2020-08-21 PROCEDURE — 7100000011 HC PHASE II RECOVERY - ADDTL 15 MIN: Performed by: UROLOGY

## 2020-08-21 PROCEDURE — 6370000000 HC RX 637 (ALT 250 FOR IP): Performed by: UROLOGY

## 2020-08-21 RX ORDER — LIDOCAINE HYDROCHLORIDE 20 MG/ML
JELLY TOPICAL PRN
Status: DISCONTINUED | OUTPATIENT
Start: 2020-08-21 | End: 2020-08-21 | Stop reason: ALTCHOICE

## 2020-08-21 RX ORDER — CEPHALEXIN 500 MG/1
500 CAPSULE ORAL 3 TIMES DAILY
Qty: 15 CAPSULE | Refills: 0 | Status: SHIPPED | OUTPATIENT
Start: 2020-08-21 | End: 2020-08-26

## 2020-08-21 ASSESSMENT — PAIN - FUNCTIONAL ASSESSMENT: PAIN_FUNCTIONAL_ASSESSMENT: 0-10

## 2020-08-21 ASSESSMENT — PAIN SCALES - GENERAL: PAINLEVEL_OUTOF10: 0

## 2020-08-21 NOTE — H&P
HISTORY and [address]       NAME:  Annemarie Rosa  MRN: [id number]   YOB: 1960   Date: 8/21/2020   Age: 63 y.o. Gender: male       COMPLAINT AND PRESENT HISTORY:     Fabián Avina is 63 y.o.,  male, here for bladder tumor with scheduled cystoscopy urethral dilatation. Pt has history of bladder cancer initially diagnosed in 2012. Reports he has surveillance cystoscopies periodically. Denies pelvic pain, flank pain, dysuria, hematuria, frequency, urgency. Reports nocturia x 1 per night.     NPO. Took coreg, aspirin, metformin, HCTZ this am with sip of water. Pt reports he was not instructed to stop blood thinners prior to this procedure. Denies recent or current or recent chest pain/pressure, palpitations, SOB, recent URI, nausea, vomiting, diarrhea, constipation, fever or chills.      PAST MEDICAL HISTORY     Past Medical History:   Diagnosis Date    Arthritis     Cancer [facility]) 2012    bladder    Depression     Diabetes mellitus ([address]     borderline    [place] (hard of hearing)     bilateral hearing aids    Hypertension     Obesity     Umbilical hernia     Wears glasses        SURGICAL HISTORY       Past Surgical History:   Procedure Laterality Date    COLONOSCOPY  2017    normal    CYSTOSCOPY  5/21/14    several    CYSTOSCOPY  8/21/14    CYSTOSCOPY  11-21-14    CYSTOSCOPY N/A 8/27/2019    CYSTOSCOPY URETHRAL DILATATION performed by Reed Quinonez MD at [address] [place] [address],2+J/N,[id number] N/A 6/13/2018    HERNIA INCARCERATED UMBILICAL REPAIR W/MESH performed by Soren Loaiza MD at [place] [address] HISTORY       Family History   Problem Relation Age of Onset    Diabetes Father     Coronary Art Dis Father     Lung Cancer Father     Diabetes Mother     Hypertension Mother     Depression Mother     Cancer Brother         bladder       SOCIAL HISTORY       Social History     Socioeconomic History    Marital status:      Spouse name: Not on file    Number of children: Not on file    Years of education: Not on file    Highest education level: Not on file   Occupational History    Not on file   Social Needs    Financial resource strain: Not on file    Food insecurity     Worry: Not on file     Inability: Not on file    Transportation needs     Medical: Not on file     Non-medical: Not on file   Tobacco Use    Smoking status: Former Smoker     Packs/day: 1.00     Years: 20.00     Pack years: 20.00     Types: Cigarettes     Start date: 1977     Last attempt to quit: 2001     Years since quittin.5    Smokeless tobacco: Former User     Quit date: 2004   Substance and Sexual Activity    Alcohol use: Yes     Types: 1 Cans of beer per week     Comment: occasional    Drug use: No    Sexual activity: Not on file   Lifestyle    Physical activity     Days per week: Not on file     Minutes per session: Not on file    Stress: Not on file   Relationships    Social connections     Talks on phone: Not on file     Gets together: Not on file     Attends Confucianist service: Not on file     Active member of club or organization: Not on file     Attends meetings of clubs or organizations: Not on file     Relationship status: Not on file    Intimate partner violence     Fear of current or ex partner: Not on file     Emotionally abused: Not on file     Physically abused: Not on file     Forced sexual activity: Not on file   Other Topics Concern    Not on file   Social History Narrative    Not on file        REVIEW OF SYSTEMS      No Known Allergies    No current facility-administered medications on file prior to encounter.       Current Outpatient Medications on File Prior to Encounter   Medication Sig Dispense Refill    hydroCHLOROthiazide (HYDRODIURIL) 25 MG tablet Take 1 tablet by mouth every morning 90 tablet 3    ibuprofen (ADVIL;MOTRIN) 800 MG tablet Take 1 tablet by mouth every 8 hours as needed for Pain 60 tablet 0    metFORMIN (GLUCOPHAGE) 500 MG tablet Take 1 tablet by mouth daily (with breakfast) 90 tablet 1    carvedilol (COREG) 25 MG tablet Take 1 tablet by mouth 2 times daily (with meals) 180 tablet 3    albuterol sulfate HFA (PROAIR HFA) 108 (90 Base) MCG/ACT inhaler Inhale 2 puffs into the lungs every 6 hours as needed for Wheezing or Shortness of Breath 1 Inhaler 5    aspirin 81 MG tablet Take 81 mg by mouth daily.  Multiple Vitamins-Minerals (MULTIVITAMIN & MINERAL PO) Take 1 tablet by mouth daily.  Wound Dressings (MEDIHONEY WOUND/BURN DRESSING) GEL gel Apply 1 each topically daily 44 mL 1    Lancets MISC 1 each by Does not apply route 2 times daily 300 each 3       Negative except for what is mentioned in the HPI. GENERAL PHYSICAL EXAM     Vitals: BP (!) 141/82   Pulse 75   Temp 97.3 °F (36.3 °C) (Infrared)   Resp 16   Ht 6' 6\" (1.981 m)   Wt (!) 406 lb (184.2 kg)   SpO2 96%   BMI 46.92 kg/m²  Body mass index is 46.92 kg/m². GENERAL APPEARANCE:   Fabián Chahal is 63 y.o.,  male, moderately obese, nourished, conscious, alert. Does not appear to be in any distress or pain at this time. SKIN:  Warm, dry, no cyanosis or jaundice. HEAD:  Normocephalic, atraumatic. EYES:  Pupils equal, reactive to light. EARS:  No discharge, no marked hearing loss. NOSE:  No rhinorrhea, epistaxis or septal deformity. THROAT:  Not congested. No ulceration bleeding or discharge. NECK:  No stiffness, trachea central.  No palpable masses or L.N.                 CHEST:  Symmetrical and equal on expansion. HEART:  RRR S1 > S2. No audible murmurs or gallops. LUNGS:  Equal on expansion, normal breath sounds. No wheezing, rhonchi or rales. ABDOMEN:  Obese. NABS x 4 quads. Soft on palpation. No localized tenderness. No guarding or rigidity.

## 2020-08-22 LAB
CULTURE: NO GROWTH
Lab: NORMAL
SPECIMEN DESCRIPTION: NORMAL

## 2020-08-25 ENCOUNTER — TELEPHONE (OUTPATIENT)
Dept: PRIMARY CARE CLINIC | Age: 60
End: 2020-08-25

## 2020-08-27 LAB — UROTHELIAL CANCER DETECTION: NORMAL

## 2020-08-28 ENCOUNTER — HOSPITAL ENCOUNTER (OUTPATIENT)
Dept: PREADMISSION TESTING | Age: 60
Discharge: HOME | End: 2020-09-01
Payer: COMMERCIAL

## 2020-08-28 VITALS
RESPIRATION RATE: 20 BRPM | TEMPERATURE: 97.8 F | WEIGHT: 315 LBS | BODY MASS INDEX: 37.19 KG/M2 | HEART RATE: 93 BPM | DIASTOLIC BLOOD PRESSURE: 98 MMHG | SYSTOLIC BLOOD PRESSURE: 158 MMHG | HEIGHT: 77 IN | OXYGEN SATURATION: 99 %

## 2020-08-28 LAB
ABSOLUTE EOS #: 0.2 K/UL (ref 0–0.4)
ABSOLUTE IMMATURE GRANULOCYTE: ABNORMAL K/UL (ref 0–0.3)
ABSOLUTE LYMPH #: 3.1 K/UL (ref 1–4.8)
ABSOLUTE MONO #: 0.9 K/UL (ref 0.1–1.3)
ANION GAP SERPL CALCULATED.3IONS-SCNC: 5 MMOL/L (ref 9–17)
BASOPHILS # BLD: 1 % (ref 0–2)
BASOPHILS ABSOLUTE: 0.1 K/UL (ref 0–0.2)
BUN BLDV-MCNC: 14 MG/DL (ref 8–23)
BUN/CREAT BLD: ABNORMAL (ref 9–20)
CALCIUM SERPL-MCNC: 9.9 MG/DL (ref 8.6–10.4)
CHLORIDE BLD-SCNC: 104 MMOL/L (ref 98–107)
CO2: 33 MMOL/L (ref 20–31)
CREAT SERPL-MCNC: 0.78 MG/DL (ref 0.7–1.2)
DIFFERENTIAL TYPE: ABNORMAL
EOSINOPHILS RELATIVE PERCENT: 2 % (ref 0–4)
GFR AFRICAN AMERICAN: >60 ML/MIN
GFR NON-AFRICAN AMERICAN: >60 ML/MIN
GFR SERPL CREATININE-BSD FRML MDRD: ABNORMAL ML/MIN/{1.73_M2}
GFR SERPL CREATININE-BSD FRML MDRD: ABNORMAL ML/MIN/{1.73_M2}
GLUCOSE BLD-MCNC: 134 MG/DL (ref 70–99)
HCT VFR BLD CALC: 40.9 % (ref 41–53)
HEMOGLOBIN: 14.6 G/DL (ref 13.5–17.5)
IMMATURE GRANULOCYTES: ABNORMAL %
LYMPHOCYTES # BLD: 28 % (ref 24–44)
MCH RBC QN AUTO: 31.8 PG (ref 26–34)
MCHC RBC AUTO-ENTMCNC: 35.8 G/DL (ref 31–37)
MCV RBC AUTO: 89 FL (ref 80–100)
MONOCYTES # BLD: 8 % (ref 1–7)
NRBC AUTOMATED: ABNORMAL PER 100 WBC
PDW BLD-RTO: 13 % (ref 11.5–14.9)
PLATELET # BLD: 243 K/UL (ref 150–450)
PLATELET ESTIMATE: ABNORMAL
PMV BLD AUTO: 8.8 FL (ref 6–12)
POTASSIUM SERPL-SCNC: 4.6 MMOL/L (ref 3.7–5.3)
RBC # BLD: 4.6 M/UL (ref 4.5–5.9)
RBC # BLD: ABNORMAL 10*6/UL
SEG NEUTROPHILS: 61 % (ref 36–66)
SEGMENTED NEUTROPHILS ABSOLUTE COUNT: 7.1 K/UL (ref 1.3–9.1)
SODIUM BLD-SCNC: 142 MMOL/L (ref 135–144)
WBC # BLD: 11.3 K/UL (ref 3.5–11)
WBC # BLD: ABNORMAL 10*3/UL

## 2020-08-28 PROCEDURE — 93005 ELECTROCARDIOGRAM TRACING: CPT | Performed by: ANESTHESIOLOGY

## 2020-08-28 PROCEDURE — 85025 COMPLETE CBC W/AUTO DIFF WBC: CPT

## 2020-08-28 PROCEDURE — 80048 BASIC METABOLIC PNL TOTAL CA: CPT

## 2020-08-28 PROCEDURE — 36415 COLL VENOUS BLD VENIPUNCTURE: CPT

## 2020-08-28 NOTE — H&P
HISTORY and [address]       NAME:  Vera Durbin  MRN: [id number]   YOB: 1960   Date: 8/28/2020   Age: 63 y.o. Gender: male       COMPLAINT AND PRESENT HISTORY:     Fabián Amaya is 63 y.o.,  male, here for preanesthesia testing for bladder tumor with scheduled cystoscopy bladder biopsy. Pt has history of bladder cancer initially diagnosed in 2012. Reports he has surveillance cystoscopies periodically. Denies pelvic pain, flank pain, dysuria, hematuria, frequency, urgency. Reports nocturia x 1 per night. Pt had recent cystoscopy on 08/21/2020 revealing enlarge prostate and recurrent bladder tumor. PMHx  HTN: Takes hydrochlorothiazide, coreg as prescribed. Pt has history intermittent dizziness many years ago but none since. Denies recent or current chest pain/pressure, palpitations, SOB, headaches. DM: Takes metformin daily. PAST MEDICAL HISTORY     Past Medical History:   Diagnosis Date    Arthritis     Cancer [facility]) 2012    bladder    Depression     Diabetes mellitus ([facility] [address]     borderline    [place] (hard of hearing)     bilateral hearing aids    Hypertension     Obesity     SOB (shortness of breath) on exertion     Umbilical hernia     Wears glasses      Pt denies any history of stroke, heart disease, COPD, Asthma, GERD, HLD, Seizures,Thyroid disease, Kidney Disease, Hepatitis, TB or Substance abuse.     SURGICAL HISTORY       Past Surgical History:   Procedure Laterality Date    COLONOSCOPY  2017    normal    CYSTOSCOPY  5/21/14    several    CYSTOSCOPY  8/21/14    CYSTOSCOPY  11-21-14    CYSTOSCOPY N/A 8/27/2019    CYSTOSCOPY URETHRAL DILATATION performed by Deysi Diane MD at [facility] 8/21/2020    CYSTOSCOPY URETHRAL DILATATION WITH FISH TEST AND URINE CULTURE performed by Deysi Diane MD at [address],3+Z/L,[place] N/A 6/13/2018    HERNIA INCARCERATED UMBILICAL REPAIR W/MESH performed by Catarina Dominguez Eda Newton MD at [facility] 115 HISTORY       Family History   Problem Relation Age of Onset    Diabetes Father     Coronary Art Dis Father     Lung Cancer Father     Diabetes Mother     Hypertension Mother     Depression Mother     Cancer Brother         bladder       SOCIAL HISTORY       Social History     Socioeconomic History    Marital status:      Spouse name: None    Number of children: None    Years of education: None    Highest education level: None   Occupational History    None   Social Needs    Financial resource strain: None    Food insecurity     Worry: None     Inability: None    Transportation needs     Medical: None     Non-medical: None   Tobacco Use    Smoking status: Former Smoker     Packs/day: 1.00     Years: 20.00     Pack years: 20.00     Types: Cigarettes     Start date: 1977     Last attempt to quit: 2001     Years since quittin.5    Smokeless tobacco: Former User     Quit date: 2004   Substance and Sexual Activity    Alcohol use: Yes     Types: 1 Cans of beer per week     Comment: rare    Drug use: No    Sexual activity: None   Lifestyle    Physical activity     Days per week: None     Minutes per session: None    Stress: None   Relationships    Social connections     Talks on phone: None     Gets together: None     Attends Quaker service: None     Active member of club or organization: None     Attends meetings of clubs or organizations: None     Relationship status: None    Intimate partner violence     Fear of current or ex partner: None     Emotionally abused: None     Physically abused: None     Forced sexual activity: None   Other Topics Concern    None   Social History Narrative    None        REVIEW OF SYSTEMS      No Known Allergies    Current Outpatient Medications on File Prior to Encounter   Medication Sig Dispense Refill    hydroCHLOROthiazide (HYDRODIURIL) 25 MG tablet Take 1 tablet by mouth every morning 90 tablet 3  ibuprofen (ADVIL;MOTRIN) 800 MG tablet Take 1 tablet by mouth every 8 hours as needed for Pain 60 tablet 0    metFORMIN (GLUCOPHAGE) 500 MG tablet Take 1 tablet by mouth daily (with breakfast) 90 tablet 1    carvedilol (COREG) 25 MG tablet Take 1 tablet by mouth 2 times daily (with meals) 180 tablet 3    albuterol sulfate HFA (PROAIR HFA) 108 (90 Base) MCG/ACT inhaler Inhale 2 puffs into the lungs every 6 hours as needed for Wheezing or Shortness of Breath 1 Inhaler 5    aspirin 81 MG tablet Take 81 mg by mouth daily.  Multiple Vitamins-Minerals (MULTIVITAMIN & MINERAL PO) Take 1 tablet by mouth daily.  Wound Dressings ([facility]) GEL gel Apply 1 each topically daily 44 mL 1    Lancets MISC 1 each by Does not apply route 2 times daily 300 each 3     No current facility-administered medications on file prior to encounter. General health:  Fairly good. No fever or chills. Skin:  No itching, redness or rash. HEENT:  No headache, epistaxis or sore throat. Neck:  No pain, stiffness or masses. Cardiovascular/Respiratory system:  No chest pain, palpitation or shortness of breath. Gastrointestinal tract: No abdominal pain, Dysphagia, nausea, vomiting, diarrhea or constipation. Genitourinary:  See HPI. Locomotor:  No bone or joint pains. No swelling. Neuropsychiatric:  No referable complaints. GENERAL PHYSICAL EXAM:     Vitals: BP (!) 158/98   Pulse 93   Temp 97.8 °F (36.6 °C)   Resp 20   Ht 6' 5\" (1.956 m)   Wt (!) 401 lb (181.9 kg)   SpO2 99%   BMI 47.55 kg/m²  Body mass index is 47.55 kg/m². GENERAL APPEARANCE:   Fabián Naylor is 63 y.o.,  male, moderately obese, nourished, conscious, alert. Does not appear to be in any distress or pain at this time.                             SKIN:  Warm, dry, no cyanosis or jaundice. HEAD:  Normocephalic, atraumatic. EYES:  Pupils equal, reactive to light. EARS:  No discharge, no marked hearing loss. NOSE:  No rhinorrhea, epistaxis or septal deformity. THROAT:  Not congested. No ulceration bleeding or discharge. NECK:  No stiffness, trachea central.  No palpable masses or L.N.                 CHEST:  Symmetrical and equal on expansion. HEART:  Hypertensive. RRR S1 > S2. No audible murmurs or gallops. LUNGS:  Equal on expansion, normal breath sounds. No wheezing, rhonchi or rales. ABDOMEN:  Obese. NABS x 4 quads. Soft on palpation. No localized tenderness. No guarding or rigidity. LYMPHATICS:  No palpable cervical lymphadenopathy. LOCOMOTOR, BACK AND SPINE:  No tenderness or deformities. EXTREMITIES:  Symmetrical, no pretibial edema. No calf tenderness. No discoloration or ulcerations. NEUROLOGIC:  The patient is conscious, alert, oriented. Speech clear. No facial drooping. No apparent focal sensory or motor deficits.                                                                                      PROVISIONAL DIAGNOSES / SURGERY:      BLADDER TUMOR    CYSTOSCOPY BLADDER BIOPSY     Patient Active Problem List    Diagnosis Date Noted    Localized edema 06/03/2020    Diabetic polyneuropathy associated with type 2 diabetes mellitus ([address] 06/03/2020    Diabetes mellitus type 2 in obese ([address] 11/05/2018    Morbid obesity ([address] 04/26/2018    Essential hypertension 09/21/2016    Sensorineural hearing loss of both ears 09/21/2016    Bladder cancer ([address] 05/21/2014           NKECHI Kennedy CNP on 8/28/2020 at 4:15 PM

## 2020-08-28 NOTE — PROGRESS NOTES
Dr. Samm Montenegro, anesthesia, was contacted and informed of patient's history and planned surgery. Orders received and no clearance required.

## 2020-08-28 NOTE — H&P (VIEW-ONLY)
HISTORY and [address]       NAME:  Palma Flowers  MRN: [id number]   YOB: 1960   Date: 8/28/2020   Age: 63 y.o. Gender: male       COMPLAINT AND PRESENT HISTORY:     Fabián Brooks is 63 y.o.,  male, here for preanesthesia testing for bladder tumor with scheduled cystoscopy bladder biopsy. Pt has history of bladder cancer initially diagnosed in 2012. Reports he has surveillance cystoscopies periodically. Denies pelvic pain, flank pain, dysuria, hematuria, frequency, urgency. Reports nocturia x 1 per night. Pt had recent cystoscopy on 08/21/2020 revealing enlarge prostate and recurrent bladder tumor. PMHx  HTN: Takes hydrochlorothiazide, coreg as prescribed. Pt has history intermittent dizziness many years ago but none since. Denies recent or current chest pain/pressure, palpitations, SOB, headaches. DM: Takes metformin daily. PAST MEDICAL HISTORY     Past Medical History:   Diagnosis Date    Arthritis     Cancer [facility]) 2012    bladder    Depression     Diabetes mellitus ([facility] [address]     borderline    [place] (hard of hearing)     bilateral hearing aids    Hypertension     Obesity     SOB (shortness of breath) on exertion     Umbilical hernia     Wears glasses      Pt denies any history of stroke, heart disease, COPD, Asthma, GERD, HLD, Seizures,Thyroid disease, Kidney Disease, Hepatitis, TB or Substance abuse.     SURGICAL HISTORY       Past Surgical History:   Procedure Laterality Date    COLONOSCOPY  2017    normal    CYSTOSCOPY  5/21/14    several    CYSTOSCOPY  8/21/14    CYSTOSCOPY  11-21-14    CYSTOSCOPY N/A 8/27/2019    CYSTOSCOPY URETHRAL DILATATION performed by Shelley Renner MD at [facility] 8/21/2020    CYSTOSCOPY URETHRAL DILATATION WITH FISH TEST AND URINE CULTURE [facility] by [facility]ley [facility]ner MD [facility]    HERNIA INCARCERATED UMBILICAL REPAIR W/MESH performed by Jefe Chase Stephen Tejada MD at [facility] 115 HISTORY       Family History   Problem Relation Age of Onset    Diabetes Father     Coronary Art Dis Father     Lung Cancer Father     Diabetes Mother     Hypertension Mother     Depression Mother     Cancer Brother         bladder       SOCIAL HISTORY       Social History     Socioeconomic History    Marital status:      Spouse name: None    Number of children: None    Years of education: None    Highest education level: None   Occupational History    None   Social Needs    Financial resource strain: None    Food insecurity     Worry: None     Inability: None    Transportation needs     Medical: None     Non-medical: None   Tobacco Use    Smoking status: Former Smoker     Packs/day: 1.00     Years: 20.00     Pack years: 20.00     Types: Cigarettes     Start date: 1977     Last attempt to quit: 2001     Years since quittin.5    Smokeless tobacco: Former User     Quit date: 2004   Substance and Sexual Activity    Alcohol use: Yes     Types: 1 Cans of beer per week     Comment: rare    Drug use: No    Sexual activity: None   Lifestyle    Physical activity     Days per week: None     Minutes per session: None    Stress: None   Relationships    Social connections     Talks on phone: None     Gets together: None     Attends [employer] service: None     Active member of club or organization: None     Attends meetings of clubs or organizations: None     Relationship status: None    Intimate partner violence     Fear of current or ex partner: None     Emotionally abused: None     Physically abused: None     Forced sexual activity: None   Other Topics Concern    None   Social History Narrative    None        REVIEW OF SYSTEMS      No Known Allergies    Current Outpatient Medications on File Prior to Encounter   Medication Sig Dispense Refill    hydroCHLOROthiazide (HYDRODIURIL) 25 MG tablet Take 1 tablet by mouth every morning 90 tablet 3  ibuprofen (ADVIL;MOTRIN) 800 MG tablet Take 1 tablet by mouth every 8 hours as needed for Pain 60 tablet 0    metFORMIN (GLUCOPHAGE) 500 MG tablet Take 1 tablet by mouth daily (with breakfast) 90 tablet 1    carvedilol (COREG) 25 MG tablet Take 1 tablet by mouth 2 times daily (with meals) 180 tablet 3    albuterol sulfate HFA (PROAIR HFA) 108 (90 Base) MCG/ACT inhaler Inhale 2 puffs into the lungs every 6 hours as needed for Wheezing or Shortness of Breath 1 Inhaler 5    aspirin 81 MG tablet Take 81 mg by mouth daily.  Multiple Vitamins-Minerals (MULTIVITAMIN & MINERAL PO) Take 1 tablet by mouth daily.  Wound Dressings ([facility]) GEL gel Apply 1 each topically daily 44 mL 1    Lancets MISC 1 each by Does not apply route 2 times daily 300 each 3     No current facility-administered medications on file prior to encounter. General health:  Fairly good. No fever or chills. Skin:  No itching, redness or rash. HEENT:  No headache, epistaxis or sore throat. Neck:  No pain, stiffness or masses. Cardiovascular/Respiratory system:  No chest pain, palpitation or shortness of breath. Gastrointestinal tract: No abdominal pain, Dysphagia, nausea, vomiting, diarrhea or constipation. Genitourinary:  See HPI. Locomotor:  No bone or joint pains. No swelling. Neuropsychiatric:  No referable complaints. GENERAL PHYSICAL EXAM:     Vitals: BP (!) 158/98   Pulse 93   Temp 97.8 °F (36.6 °C)   Resp 20   Ht 6' 5\" (1.956 m)   Wt (!) 401 lb (181.9 kg)   SpO2 99%   BMI 47.55 kg/m²  Body mass index is 47.55 kg/m². GENERAL APPEARANCE:   Fabián Noonan is 63 y.o.,  male, moderately obese, nourished, conscious, alert. Does not appear to be in any distress or pain at this time.                             SKIN:  Warm, dry, no cyanosis or

## 2020-08-29 LAB
EKG ATRIAL RATE: 83 BPM
EKG P AXIS: 13 DEGREES
EKG P-R INTERVAL: 162 MS
EKG Q-T INTERVAL: 338 MS
EKG QRS DURATION: 92 MS
EKG QTC CALCULATION (BAZETT): 397 MS
EKG R AXIS: 23 DEGREES
EKG T AXIS: -13 DEGREES
EKG VENTRICULAR RATE: 83 BPM

## 2020-08-29 PROCEDURE — 93010 ELECTROCARDIOGRAM REPORT: CPT | Performed by: INTERNAL MEDICINE

## 2020-09-04 ENCOUNTER — HOSPITAL ENCOUNTER (OUTPATIENT)
Dept: PREADMISSION TESTING | Age: 60
Setting detail: SPECIMEN
Discharge: HOME | End: 2020-09-08
Payer: COMMERCIAL

## 2020-09-04 PROCEDURE — U0003 INFECTIOUS AGENT DETECTION BY NUCLEIC ACID (DNA OR RNA); SEVERE ACUTE RESPIRATORY SYNDROME CORONAVIRUS 2 (SARS-COV-2) (CORONAVIRUS DISEASE [COVID-19]), AMPLIFIED PROBE TECHNIQUE, MAKING USE OF HIGH THROUGHPUT TECHNOLOGIES AS DESCRIBED BY CMS-2020-01-R: HCPCS

## 2020-09-07 LAB — SARS-COV-2, NAA: NOT DETECTED

## 2020-09-08 ENCOUNTER — HOSPITAL ENCOUNTER (OUTPATIENT)
Age: 60
Setting detail: OUTPATIENT SURGERY
Discharge: HOME | End: 2020-09-08
Attending: UROLOGY | Admitting: UROLOGY
Payer: COMMERCIAL

## 2020-09-08 ENCOUNTER — ANESTHESIA EVENT (OUTPATIENT)
Dept: OPERATING ROOM | Age: 60
End: 2020-09-08
Payer: COMMERCIAL

## 2020-09-08 ENCOUNTER — ANESTHESIA (OUTPATIENT)
Dept: OPERATING ROOM | Age: 60
End: 2020-09-08
Payer: COMMERCIAL

## 2020-09-08 VITALS
DIASTOLIC BLOOD PRESSURE: 76 MMHG | TEMPERATURE: 97.3 F | HEIGHT: 77 IN | RESPIRATION RATE: 18 BRPM | HEART RATE: 70 BPM | WEIGHT: 315 LBS | SYSTOLIC BLOOD PRESSURE: 138 MMHG | BODY MASS INDEX: 37.19 KG/M2 | OXYGEN SATURATION: 96 %

## 2020-09-08 VITALS
DIASTOLIC BLOOD PRESSURE: 62 MMHG | RESPIRATION RATE: 2 BRPM | OXYGEN SATURATION: 86 % | TEMPERATURE: 94.8 F | SYSTOLIC BLOOD PRESSURE: 90 MMHG

## 2020-09-08 LAB
GLUCOSE BLD-MCNC: 164 MG/DL (ref 75–110)
GLUCOSE BLD-MCNC: 208 MG/DL (ref 75–110)

## 2020-09-08 PROCEDURE — 6360000002 HC RX W HCPCS

## 2020-09-08 PROCEDURE — 3700000001 HC ADD 15 MINUTES (ANESTHESIA): Performed by: UROLOGY

## 2020-09-08 PROCEDURE — 87086 URINE CULTURE/COLONY COUNT: CPT

## 2020-09-08 PROCEDURE — 2709999900 HC NON-CHARGEABLE SUPPLY: Performed by: UROLOGY

## 2020-09-08 PROCEDURE — 2580000003 HC RX 258: Performed by: ANESTHESIOLOGY

## 2020-09-08 PROCEDURE — 3700000000 HC ANESTHESIA ATTENDED CARE: Performed by: UROLOGY

## 2020-09-08 PROCEDURE — 7100000010 HC PHASE II RECOVERY - FIRST 15 MIN: Performed by: UROLOGY

## 2020-09-08 PROCEDURE — 2500000003 HC RX 250 WO HCPCS

## 2020-09-08 PROCEDURE — 6370000000 HC RX 637 (ALT 250 FOR IP): Performed by: UROLOGY

## 2020-09-08 PROCEDURE — 82947 ASSAY GLUCOSE BLOOD QUANT: CPT

## 2020-09-08 PROCEDURE — 6360000002 HC RX W HCPCS: Performed by: ANESTHESIOLOGY

## 2020-09-08 PROCEDURE — 7100000011 HC PHASE II RECOVERY - ADDTL 15 MIN: Performed by: UROLOGY

## 2020-09-08 PROCEDURE — 7100000031 HC ASPR PHASE II RECOVERY - ADDTL 15 MIN: Performed by: UROLOGY

## 2020-09-08 PROCEDURE — 88305 TISSUE EXAM BY PATHOLOGIST: CPT

## 2020-09-08 PROCEDURE — 3600000003 HC SURGERY LEVEL 3 BASE: Performed by: UROLOGY

## 2020-09-08 PROCEDURE — 7100000001 HC PACU RECOVERY - ADDTL 15 MIN: Performed by: UROLOGY

## 2020-09-08 PROCEDURE — 7100000000 HC PACU RECOVERY - FIRST 15 MIN: Performed by: UROLOGY

## 2020-09-08 PROCEDURE — 6360000002 HC RX W HCPCS: Performed by: UROLOGY

## 2020-09-08 PROCEDURE — 7100000030 HC ASPR PHASE II RECOVERY - FIRST 15 MIN: Performed by: UROLOGY

## 2020-09-08 PROCEDURE — 2500000003 HC RX 250 WO HCPCS: Performed by: ANESTHESIOLOGY

## 2020-09-08 PROCEDURE — 3600000013 HC SURGERY LEVEL 3 ADDTL 15MIN: Performed by: UROLOGY

## 2020-09-08 RX ORDER — METOCLOPRAMIDE HYDROCHLORIDE 5 MG/ML
10 INJECTION INTRAMUSCULAR; INTRAVENOUS ONCE
Status: COMPLETED | OUTPATIENT
Start: 2020-09-08 | End: 2020-09-08

## 2020-09-08 RX ORDER — CEPHALEXIN 500 MG/1
500 CAPSULE ORAL 3 TIMES DAILY
Qty: 15 CAPSULE | Refills: 0 | Status: SHIPPED | OUTPATIENT
Start: 2020-09-08 | End: 2020-09-13

## 2020-09-08 RX ORDER — HYDRALAZINE HYDROCHLORIDE 20 MG/ML
5 INJECTION INTRAMUSCULAR; INTRAVENOUS EVERY 10 MIN PRN
Status: DISCONTINUED | OUTPATIENT
Start: 2020-09-08 | End: 2020-09-08 | Stop reason: HOSPADM

## 2020-09-08 RX ORDER — SODIUM CHLORIDE 0.9 % (FLUSH) 0.9 %
10 SYRINGE (ML) INJECTION EVERY 12 HOURS SCHEDULED
Status: DISCONTINUED | OUTPATIENT
Start: 2020-09-08 | End: 2020-09-08 | Stop reason: HOSPADM

## 2020-09-08 RX ORDER — METOCLOPRAMIDE HYDROCHLORIDE 5 MG/ML
10 INJECTION INTRAMUSCULAR; INTRAVENOUS
Status: DISCONTINUED | OUTPATIENT
Start: 2020-09-08 | End: 2020-09-08 | Stop reason: HOSPADM

## 2020-09-08 RX ORDER — LIDOCAINE HYDROCHLORIDE 10 MG/ML
INJECTION, SOLUTION EPIDURAL; INFILTRATION; INTRACAUDAL; PERINEURAL PRN
Status: DISCONTINUED | OUTPATIENT
Start: 2020-09-08 | End: 2020-09-08 | Stop reason: SDUPTHER

## 2020-09-08 RX ORDER — FENTANYL CITRATE 50 UG/ML
25 INJECTION, SOLUTION INTRAMUSCULAR; INTRAVENOUS EVERY 5 MIN PRN
Status: DISCONTINUED | OUTPATIENT
Start: 2020-09-08 | End: 2020-09-08 | Stop reason: HOSPADM

## 2020-09-08 RX ORDER — SUCCINYLCHOLINE/SOD CL,ISO/PF 200MG/10ML
SYRINGE (ML) INTRAVENOUS PRN
Status: DISCONTINUED | OUTPATIENT
Start: 2020-09-08 | End: 2020-09-08 | Stop reason: SDUPTHER

## 2020-09-08 RX ORDER — PROPOFOL 10 MG/ML
INJECTION, EMULSION INTRAVENOUS PRN
Status: DISCONTINUED | OUTPATIENT
Start: 2020-09-08 | End: 2020-09-08 | Stop reason: SDUPTHER

## 2020-09-08 RX ORDER — HYDROCODONE BITARTRATE AND ACETAMINOPHEN 5; 325 MG/1; MG/1
2 TABLET ORAL PRN
Status: DISCONTINUED | OUTPATIENT
Start: 2020-09-08 | End: 2020-09-08 | Stop reason: HOSPADM

## 2020-09-08 RX ORDER — ONDANSETRON 2 MG/ML
4 INJECTION INTRAMUSCULAR; INTRAVENOUS
Status: DISCONTINUED | OUTPATIENT
Start: 2020-09-08 | End: 2020-09-08 | Stop reason: HOSPADM

## 2020-09-08 RX ORDER — DEXAMETHASONE SODIUM PHOSPHATE 4 MG/ML
INJECTION, SOLUTION INTRA-ARTICULAR; INTRALESIONAL; INTRAMUSCULAR; INTRAVENOUS; SOFT TISSUE PRN
Status: DISCONTINUED | OUTPATIENT
Start: 2020-09-08 | End: 2020-09-08 | Stop reason: SDUPTHER

## 2020-09-08 RX ORDER — DIPHENHYDRAMINE HYDROCHLORIDE 50 MG/ML
12.5 INJECTION INTRAMUSCULAR; INTRAVENOUS
Status: DISCONTINUED | OUTPATIENT
Start: 2020-09-08 | End: 2020-09-08 | Stop reason: HOSPADM

## 2020-09-08 RX ORDER — FENTANYL CITRATE 50 UG/ML
INJECTION, SOLUTION INTRAMUSCULAR; INTRAVENOUS PRN
Status: DISCONTINUED | OUTPATIENT
Start: 2020-09-08 | End: 2020-09-08 | Stop reason: SDUPTHER

## 2020-09-08 RX ORDER — MORPHINE SULFATE 2 MG/ML
2 INJECTION, SOLUTION INTRAMUSCULAR; INTRAVENOUS EVERY 5 MIN PRN
Status: DISCONTINUED | OUTPATIENT
Start: 2020-09-08 | End: 2020-09-08 | Stop reason: HOSPADM

## 2020-09-08 RX ORDER — SODIUM CHLORIDE 0.9 % (FLUSH) 0.9 %
10 SYRINGE (ML) INJECTION PRN
Status: DISCONTINUED | OUTPATIENT
Start: 2020-09-08 | End: 2020-09-08 | Stop reason: HOSPADM

## 2020-09-08 RX ORDER — FENTANYL CITRATE 50 UG/ML
50 INJECTION, SOLUTION INTRAMUSCULAR; INTRAVENOUS EVERY 5 MIN PRN
Status: DISCONTINUED | OUTPATIENT
Start: 2020-09-08 | End: 2020-09-08 | Stop reason: HOSPADM

## 2020-09-08 RX ORDER — SODIUM CHLORIDE 9 MG/ML
INJECTION, SOLUTION INTRAVENOUS CONTINUOUS
Status: DISCONTINUED | OUTPATIENT
Start: 2020-09-08 | End: 2020-09-08 | Stop reason: HOSPADM

## 2020-09-08 RX ORDER — HYDROCODONE BITARTRATE AND ACETAMINOPHEN 5; 325 MG/1; MG/1
1 TABLET ORAL PRN
Status: DISCONTINUED | OUTPATIENT
Start: 2020-09-08 | End: 2020-09-08 | Stop reason: HOSPADM

## 2020-09-08 RX ORDER — HYDROCODONE BITARTRATE AND ACETAMINOPHEN 5; 325 MG/1; MG/1
1 TABLET ORAL EVERY 4 HOURS PRN
Qty: 18 TABLET | Refills: 0 | Status: SHIPPED | OUTPATIENT
Start: 2020-09-08 | End: 2020-09-11

## 2020-09-08 RX ORDER — MEPERIDINE HYDROCHLORIDE 25 MG/ML
12.5 INJECTION INTRAMUSCULAR; INTRAVENOUS; SUBCUTANEOUS EVERY 5 MIN PRN
Status: DISCONTINUED | OUTPATIENT
Start: 2020-09-08 | End: 2020-09-08 | Stop reason: HOSPADM

## 2020-09-08 RX ORDER — LABETALOL HYDROCHLORIDE 5 MG/ML
5 INJECTION, SOLUTION INTRAVENOUS EVERY 10 MIN PRN
Status: DISCONTINUED | OUTPATIENT
Start: 2020-09-08 | End: 2020-09-08 | Stop reason: HOSPADM

## 2020-09-08 RX ORDER — MIDAZOLAM HYDROCHLORIDE 1 MG/ML
INJECTION INTRAMUSCULAR; INTRAVENOUS PRN
Status: DISCONTINUED | OUTPATIENT
Start: 2020-09-08 | End: 2020-09-08 | Stop reason: SDUPTHER

## 2020-09-08 RX ORDER — LIDOCAINE HYDROCHLORIDE 20 MG/ML
JELLY TOPICAL PRN
Status: DISCONTINUED | OUTPATIENT
Start: 2020-09-08 | End: 2020-09-08 | Stop reason: ALTCHOICE

## 2020-09-08 RX ORDER — ONDANSETRON 2 MG/ML
INJECTION INTRAMUSCULAR; INTRAVENOUS PRN
Status: DISCONTINUED | OUTPATIENT
Start: 2020-09-08 | End: 2020-09-08 | Stop reason: SDUPTHER

## 2020-09-08 RX ADMIN — PROPOFOL 150 MG: 10 INJECTION, EMULSION INTRAVENOUS at 08:01

## 2020-09-08 RX ADMIN — MIDAZOLAM 2 MG: 1 INJECTION INTRAMUSCULAR; INTRAVENOUS at 07:51

## 2020-09-08 RX ADMIN — FENTANYL CITRATE 50 MCG: 50 INJECTION, SOLUTION INTRAMUSCULAR; INTRAVENOUS at 08:14

## 2020-09-08 RX ADMIN — FAMOTIDINE 20 MG: 10 INJECTION, SOLUTION INTRAVENOUS at 07:06

## 2020-09-08 RX ADMIN — ONDANSETRON 4 MG: 2 INJECTION INTRAMUSCULAR; INTRAVENOUS at 08:11

## 2020-09-08 RX ADMIN — Medication 3 G: at 08:09

## 2020-09-08 RX ADMIN — PROPOFOL 200 MG: 10 INJECTION, EMULSION INTRAVENOUS at 07:57

## 2020-09-08 RX ADMIN — SODIUM CHLORIDE: 9 INJECTION, SOLUTION INTRAVENOUS at 06:56

## 2020-09-08 RX ADMIN — SODIUM CHLORIDE: 9 INJECTION, SOLUTION INTRAVENOUS at 08:26

## 2020-09-08 RX ADMIN — LIDOCAINE HYDROCHLORIDE 50 MG: 10 INJECTION, SOLUTION EPIDURAL; INFILTRATION; INTRACAUDAL; PERINEURAL at 07:57

## 2020-09-08 RX ADMIN — Medication 160 MG: at 08:01

## 2020-09-08 RX ADMIN — LIDOCAINE HYDROCHLORIDE 100 MG: 10 INJECTION, SOLUTION EPIDURAL; INFILTRATION; INTRACAUDAL; PERINEURAL at 08:40

## 2020-09-08 RX ADMIN — DEXAMETHASONE SODIUM PHOSPHATE 4 MG: 4 INJECTION, SOLUTION INTRA-ARTICULAR; INTRALESIONAL; INTRAMUSCULAR; INTRAVENOUS; SOFT TISSUE at 08:11

## 2020-09-08 RX ADMIN — METOCLOPRAMIDE 10 MG: 5 INJECTION, SOLUTION INTRAMUSCULAR; INTRAVENOUS at 07:09

## 2020-09-08 ASSESSMENT — PULMONARY FUNCTION TESTS
PIF_VALUE: 30
PIF_VALUE: 1
PIF_VALUE: 30
PIF_VALUE: 2
PIF_VALUE: 1
PIF_VALUE: 30
PIF_VALUE: 30
PIF_VALUE: 31
PIF_VALUE: 30
PIF_VALUE: 37
PIF_VALUE: 34
PIF_VALUE: 0
PIF_VALUE: 0
PIF_VALUE: 30
PIF_VALUE: 1
PIF_VALUE: 0
PIF_VALUE: 30
PIF_VALUE: 30
PIF_VALUE: 0
PIF_VALUE: 34
PIF_VALUE: 30
PIF_VALUE: 35
PIF_VALUE: 30
PIF_VALUE: 31
PIF_VALUE: 30
PIF_VALUE: 3
PIF_VALUE: 27
PIF_VALUE: 21
PIF_VALUE: 23
PIF_VALUE: 23
PIF_VALUE: 0
PIF_VALUE: 30
PIF_VALUE: 28
PIF_VALUE: 30
PIF_VALUE: 5
PIF_VALUE: 30
PIF_VALUE: 5
PIF_VALUE: 30

## 2020-09-08 ASSESSMENT — ENCOUNTER SYMPTOMS
STRIDOR: 0
SHORTNESS OF BREATH: 1

## 2020-09-08 ASSESSMENT — PAIN DESCRIPTION - LOCATION: LOCATION: THROAT;PENIS

## 2020-09-08 ASSESSMENT — PAIN - FUNCTIONAL ASSESSMENT: PAIN_FUNCTIONAL_ASSESSMENT: 0-10

## 2020-09-08 ASSESSMENT — PAIN DESCRIPTION - PAIN TYPE
TYPE: ACUTE PAIN
TYPE: ACUTE PAIN

## 2020-09-08 ASSESSMENT — PAIN SCALES - GENERAL
PAINLEVEL_OUTOF10: 2
PAINLEVEL_OUTOF10: 2
PAINLEVEL_OUTOF10: 0

## 2020-09-08 NOTE — H&P
71-year-old male with history of bladder cancer tumor was identified at the time of cystoscopy, is scheduled today for transurethral resection    I agree with the findings of the history and physical of September 8

## 2020-09-08 NOTE — ANESTHESIA POSTPROCEDURE EVALUATION
POST- ANESTHESIA EVALUATION       Pt Name: Bam Zayas  MRN: [id number]  [place]: 1960  Date of evaluation: 9/8/2020  Time:  10:46 AM      /76   Pulse 70   Temp 97.3 °F (36.3 °C) (Temporal)   Resp 18   Ht 6' 5\" (1.956 m)   Wt (!) 401 lb (181.9 kg)   SpO2 96%   BMI 47.55 kg/m²      Consciousness Level  Awake  Cardiopulmonary Status  Stable  Pain Adequately Treated YES  Nausea / Vomiting  NO  Adequate Hydration  YES  Anesthesia Related Complications NONE      Electronically signed by Benoit Somers MD on 9/8/2020 at 10:46 AM       Department of Anesthesiology  Postprocedure Note    Patient: Bam Zayas  MRN: [id number]  [place]: 1960  Date of evaluation: 9/8/2020  Time:  10:46 AM     Procedure Summary     Date:  09/08/20 Room / Location:  [address] [place] [address]: [facility]    Anesthesia Start:  4536 Anesthesia Stop:  1719    Procedure:  CYSTOSCOPY AND RESECTION OF SMALL BLADDER TUMOR (N/A ) Diagnosis:  (BLADDER TUMOR)    Surgeon:  Yesenia Ulloa MD Responsible Provider:  Benoit Somers MD    Anesthesia Type:  general ASA Status:  3          Anesthesia Type: general    Lashae Phase I: Lashae Score: 10    Lashae Phase II: Lashae Score: 10    Last vitals: Reviewed and per EMR flowsheets.        Anesthesia Post Evaluation

## 2020-09-08 NOTE — OP NOTE
Operative Note      Patient: Clarence Tang  YOB: 1960  MRN: [id number]    Date of Procedure: 9/8/2020    Pre-Op Diagnosis: Recurrent BLADDER TUMOR    Post-Op Diagnosis: Same, bladder tumor posterior to right ureteral orifice       Procedure(s):  CYSTOSCOPY BLADDER BIOPSY and bladder tumor resection    Surgeon(s):  Rowan Brenner MD    Assistant:   * No surgical staff found *    Anesthesia: General    Estimated Blood Loss (mL): Minimal    Complications: None    Specimens:   * No specimens in log *    Implants:  * No implants in log *      Drains: * No LDAs found *    Indications: 27-year-old male with history of bladder cancer, patient has had prior transurethral resection, transitional cell carcinoma no invasion of the lamina propria    Recent cystoscopy documented evidence of recurrent tumor right lateral wall, patient scheduled for transurethral resection    Detailed Description of Procedure:   Patient was brought to the operating room, positioned in dorsal lithotomy, proper patient identification procedure identification prepping and draping in the usual sterile manner. We entered the bladder with the cystoscope, prostate evaluation demonstrates lateral lobe hypertrophy and median lobe hypertrophy.     We identified the tumor on the  floor  of the bladder, posterior to the right ureteral orifice, transurethral resection and biopsy and cauterization was then carried out with control of the bleeding    At the completion the bladder was emptied, the specimen was sent to pathology, the patient was returned to recovery room in stable condition    Recommendations follow-up visit at the office to discuss pathology report    Electronically signed by Rowan Brenner MD on 9/8/2020 at 7:55 AM

## 2020-09-08 NOTE — ANESTHESIA PRE PROCEDURE
Department of Anesthesiology  Preprocedure Note       Name:  Vito Mark   Age:  63 y.o.  :  1960                                          MRN:  [id number]         Date:  2020      Surgeon: Oanh Marrero):  Marie Reardon MD    Procedure: Procedure(s):  CYSTOSCOPY BLADDER BIOPSY    Medications prior to admission:   Prior to Admission medications    Medication Sig Start Date End Date Taking? Authorizing Provider   hydroCHLOROthiazide (HYDRODIURIL) 25 MG tablet Take 1 tablet by mouth every morning 20   Zeb Cruz MD   ibuprofen (ADVIL;MOTRIN) 800 MG tablet Take 1 tablet by mouth every 8 hours as needed for Pain 20   Zeb Cruz MD   Wound Dressings ([facility] WOUND/BURN DRESSING) GEL gel Apply 1 each topically daily 6/3/20   Zeb Cruz MD   metFORMIN (GLUCOPHAGE) 500 MG tablet Take 1 tablet by mouth daily (with breakfast) 6/3/20   Zeb Cruz MD   carvedilol (COREG) 25 MG tablet Take 1 tablet by mouth 2 times daily (with meals) 6/3/20   Zeb Cruz MD   albuterol sulfate HFA (PROAIR HFA) 108 (90 Base) MCG/ACT inhaler Inhale 2 puffs into the lungs every 6 hours as needed for Wheezing or Shortness of Breath 19   Zeb Cruz MD   Lancets MISC 1 each by Does not apply route 2 times daily 18   Zeb Cruz MD   aspirin 81 MG tablet Take 81 mg by mouth daily. Historical Provider, MD   Multiple Vitamins-Minerals (MULTIVITAMIN & MINERAL PO) Take 1 tablet by mouth daily.     Historical Provider, MD       Current medications:    Current Facility-Administered Medications   Medication Dose Route Frequency Provider Last Rate Last Dose    0.9 % sodium chloride infusion   Intravenous Continuous Adriana Payne MD        ceFAZolin (ANCEF) 2 g in dextrose 5 % 50 mL IVPB  2 g Intravenous Once Marie Reardon MD           Allergies:  No Known Allergies    Problem List:    Patient Active Problem List   Diagnosis Code    Bladder cancer ([address] C67.9    Essential hypertension I10    Sensorineural hearing loss of both ears H90.3    Morbid obesity (HCC) E66.01    Diabetes mellitus type 2 in obese (HCC) E11.69, E66.9    Localized edema R60.0    Diabetic polyneuropathy associated with type 2 diabetes mellitus ([facility] [address] E11.42       Past Medical History:        Diagnosis Date    Arthritis     Cancer ([facility] [address]     bladder    Depression     [facility]     borderline    [place] (hard of hearing)     bilateral hearing aids    Hypertension     Obesity     SOB (shortness of breath) on exertion     Umbilical hernia     Wears glasses        Past Surgical History:        Procedure Laterality Date    COLONOSCOPY      normal    CYSTOSCOPY  14    several    CYSTOSCOPY  14    CYSTOSCOPY  14    CYSTOSCOPY N/A 2019    CYSTOSCOPY URETHRAL DILATATION performed by Jonathan Sheth MD at [facility] 2020    CYSTOSCOPY URETHRAL DILATATION WITH FISH TEST AND URINE CULTURE performed by Jonathan Sheth MD at [address],7+S/V,[facility] N/A 2018    HERNIA INCARCERATED UMBILICAL REPAIR [address] [facility] [address] performed by Elle Field MD at [facility] History:    Social History     Tobacco Use    Smoking status: Former Smoker     Packs/day: 1.00     Years: 20.00     Pack years: 20.00     Types: Cigarettes     Start date: 1977     Last attempt to quit: 2001     Years since quittin.6    Smokeless tobacco: Former User     Quit date: 2004   Substance Use Topics    Alcohol use: Yes     Types: 1 Cans of beer per week     Comment: rare                                Counseling given: Not Answered      Vital Signs (Current): There were no vitals filed for this visit.                                            BP Readings from Last 3 Encounters:   20 (!) 158/98   20 138/85   20 136/84       NPO Status: BMI:   Wt Readings from Last 3 Encounters:   08/28/20 (!) 401 lb (181.9 kg)   08/21/20 (!) 406 lb (184.2 kg)   06/03/20 (!) 406 lb (184.2 kg)     There is no height or weight on file to calculate BMI.    CBC:   Lab Results   Component Value Date    WBC 11.3 08/28/2020    RBC 4.60 08/28/2020    HGB 14.6 08/28/2020    HCT 40.9 08/28/2020    MCV 89.0 08/28/2020    RDW 13.0 08/28/2020     08/28/2020       CMP:   Lab Results   Component Value Date     08/28/2020    K 4.6 08/28/2020     08/28/2020    CO2 33 08/28/2020    BUN 14 08/28/2020    CREATININE 0.78 08/28/2020    GFRAA >60 08/28/2020    LABGLOM >60 08/28/2020    GLUCOSE 134 08/28/2020    GLUCOSE 129 06/11/2020    PROT 7.2 06/11/2020    CALCIUM 9.9 08/28/2020    BILITOT 0.6 06/11/2020    ALKPHOS 65 06/11/2020    AST 32 06/11/2020    ALT 42 06/11/2020       POC Tests: No results for input(s): POCGLU, POCNA, POCK, POCCL, POCBUN, POCHEMO, POCHCT in the last 72 hours.     Coags: No results found for: PROTIME, INR, APTT    HCG (If Applicable): No results found for: PREGTESTUR, PREGSERUM, HCG, HCGQUANT     ABGs: No results found for: PHART, PO2ART, MJT1ENK, DSN0APV, BEART, P2IGBYOS     Type & Screen (If Applicable):  No results found for: LABABO, LABRH    Drug/Infectious Status (If Applicable):  Lab Results   Component Value Date    HEPCAB NEGATIVE 04/27/2018       COVID-19 Screening (If Applicable):   Lab Results   Component Value Date    COVID19 Not Detected 09/04/2020         Anesthesia Evaluation  Patient summary reviewed and Nursing notes reviewed no history of anesthetic complications:   Airway: Mallampati: III  TM distance: >3 FB   Neck ROM: full  Mouth opening: > = 3 FB Dental: normal exam         Pulmonary: breath sounds clear to auscultation  (+) shortness of breath:      (-) rhonchi, wheezes, rales and stridor                           Cardiovascular:    (+) hypertension: no interval change,     (-) murmur, weak pulses,  friction rub, systolic click, carotid bruit,  JVD and peripheral edema    ECG reviewed  Rhythm: regular  Rate: normal                    Neuro/Psych:   (+) neuromuscular disease:, psychiatric history:             ROS comment: Diabetic neuropathy GI/Hepatic/Renal: Neg GI/Hepatic/Renal ROS  (+) morbid obesity          Endo/Other:    (+) DiabetesType II DM, , .                 Abdominal:   (+) obese,         Vascular:                                        Anesthesia Plan      general     ASA 3       Induction: intravenous. MIPS: Postoperative opioids intended and Prophylactic antiemetics administered. Anesthetic plan and risks discussed with patient. Plan discussed with CRNA.                   Liz Person MD   9/8/2020

## 2020-09-09 LAB
CULTURE: NO GROWTH
Lab: NORMAL
SPECIMEN DESCRIPTION: NORMAL

## 2020-09-10 ENCOUNTER — OFFICE VISIT (OUTPATIENT)
Dept: PRIMARY CARE CLINIC | Age: 60
End: 2020-09-10
Payer: COMMERCIAL

## 2020-09-10 VITALS
DIASTOLIC BLOOD PRESSURE: 80 MMHG | OXYGEN SATURATION: 96 % | BODY MASS INDEX: 37.19 KG/M2 | HEART RATE: 69 BPM | HEIGHT: 77 IN | SYSTOLIC BLOOD PRESSURE: 136 MMHG | WEIGHT: 315 LBS

## 2020-09-10 LAB — HBA1C MFR BLD: 7.1 %

## 2020-09-10 PROCEDURE — 83036 HEMOGLOBIN GLYCOSYLATED A1C: CPT | Performed by: FAMILY MEDICINE

## 2020-09-10 PROCEDURE — 3051F HG A1C>EQUAL 7.0%<8.0%: CPT | Performed by: FAMILY MEDICINE

## 2020-09-10 PROCEDURE — 90471 IMMUNIZATION ADMIN: CPT | Performed by: FAMILY MEDICINE

## 2020-09-10 PROCEDURE — 99213 OFFICE O/P EST LOW 20 MIN: CPT | Performed by: FAMILY MEDICINE

## 2020-09-10 PROCEDURE — 90686 IIV4 VACC NO PRSV 0.5 ML IM: CPT | Performed by: FAMILY MEDICINE

## 2020-09-10 ASSESSMENT — ENCOUNTER SYMPTOMS
RHINORRHEA: 0
NAUSEA: 0
COUGH: 0
DIARRHEA: 0
EYE REDNESS: 0
SORE THROAT: 0
EYE DISCHARGE: 0
WHEEZING: 0
VOMITING: 0
ABDOMINAL PAIN: 0
SHORTNESS OF BREATH: 0

## 2020-09-10 ASSESSMENT — PATIENT HEALTH QUESTIONNAIRE - PHQ9
2. FEELING DOWN, DEPRESSED OR HOPELESS: 0
SUM OF ALL RESPONSES TO PHQ9 QUESTIONS 1 & 2: 0
SUM OF ALL RESPONSES TO PHQ QUESTIONS 1-9: 0
SUM OF ALL RESPONSES TO PHQ QUESTIONS 1-9: 0
1. LITTLE INTEREST OR PLEASURE IN DOING THINGS: 0

## 2020-09-10 NOTE — PROGRESS NOTES
[facility] PRIMARY CARE  [address] [place] [id number]  Dept: [phone]    Fabián Salcido is a 63 y.o. male who presents today for his medical conditions/complaintsas noted below. Chief Complaint   Patient presents with    Diabetes    Flu Vaccine     Quad       HPI:     HPI  Patient had repeat cystoscopy done. Bladder tumor was still noted. Pathology report pending. Patient denies any chest pain or shortness of breath. Patient states having difficulty following a diet. Having trouble losing weight. Has tried diet and exercise without success. Patient has considered weight loss surgery in the past.  His urologist recommended that he strongly consider weight loss.     LDL Calculated (mg/dL)   Date Value   06/11/2020 101   04/27/2018 104   09/23/2016 103       (goal LDL is <100)   AST (U/L)   Date Value   06/11/2020 32     ALT (U/L)   Date Value   06/11/2020 42 (H)     BUN (mg/dL)   Date Value   08/28/2020 14     BP Readings from Last 3 Encounters:   09/10/20 136/80   09/08/20 138/76   09/08/20 90/62          (goal 120/80)    Past Medical History:   Diagnosis Date    Arthritis     Cancer [facility]) 2012    bladder    Depression     Diabetes mellitus ([facility] [address]     borderline    [place] (hard of hearing)     bilateral hearing aids    Hypertension     Obesity     SOB (shortness of breath) on exertion     Umbilical hernia     Wears glasses       Past Surgical History:   Procedure Laterality Date    COLONOSCOPY  2017    normal    CYSTOSCOPY  5/21/14    several    CYSTOSCOPY  8/21/14    CYSTOSCOPY  11-21-14    CYSTOSCOPY N/A 8/27/2019    CYSTOSCOPY URETHRAL DILATATION performed by Gene Go MD at [facility] 8/21/2020    CYSTOSCOPY URETHRAL DILATATION WITH FISH TEST AND URINE CULTURE performed by Gene Go MD at [facility] 9/8/2020    CYSTOSCOPY AND RESECTION OF SMALL BLADDER TUMOR performed by Gene Go MD at [facility]    REPAIR UMBILICAL BPPO,5+K/L,PJSKZO N/A 2018    HERNIA INCARCERATED UMBILICAL REPAIR W/MESH performed by Joann Stewart MD at [address] History   Problem Relation Age of Onset    Diabetes Father     Coronary Art Dis Father     Lung Cancer Father     Diabetes Mother     Hypertension Mother     Depression Mother     Cancer Brother         bladder       Social History     Tobacco Use    Smoking status: Former Smoker     Packs/day: 1.00     Years: 20.00     Pack years: 20.00     Types: Cigarettes     Start date: 1977     Last attempt to quit: 2001     Years since quittin.6    Smokeless tobacco: Former User     Quit date: 2004   Substance Use Topics    Alcohol use: Yes     Types: 1 Cans of beer per week     Comment: rare      Current Outpatient Medications   Medication Sig Dispense Refill    Phentermine-Topiramate 7.5-46 MG CP24 Take 1 capsule by mouth daily for 30 days. 30 capsule 2    cephALEXin (KEFLEX) 500 MG capsule Take 1 capsule by mouth 3 times daily for 5 days 15 capsule 0    HYDROcodone-acetaminophen (NORCO) 5-325 MG per tablet Take 1 tablet by mouth every 4 hours as needed for Pain for up to 3 days. Intended supply: 3 days.  Take lowest dose possible to manage pain 18 tablet 0    hydroCHLOROthiazide (HYDRODIURIL) 25 MG tablet Take 1 tablet by mouth every morning 90 tablet 3    ibuprofen (ADVIL;MOTRIN) 800 MG tablet Take 1 tablet by mouth every 8 hours as needed for Pain 60 tablet 0    Wound Dressings (MEDIHONEY WOUND/BURN DRESSING) GEL gel Apply 1 each topically daily 44 mL 1    metFORMIN (GLUCOPHAGE) 500 MG tablet Take 1 tablet by mouth daily (with breakfast) 90 tablet 1    carvedilol (COREG) 25 MG tablet Take 1 tablet by mouth 2 times daily (with meals) 180 tablet 3    albuterol sulfate HFA (PROAIR HFA) 108 (90 Base) MCG/ACT inhaler Inhale 2 puffs into the lungs every 6 hours as needed for Wheezing or Shortness of Breath 1 Inhaler 5    Lancets MISC 1 each by Does not apply route 2 times daily 300 each 3    aspirin 81 MG tablet Take 81 mg by mouth daily.  Multiple Vitamins-Minerals (MULTIVITAMIN & MINERAL PO) Take 1 tablet by mouth daily. No current facility-administered medications for this visit. No Known Allergies    Health Maintenance   Topic Date Due    HIV screen  02/02/1974    Shingles Vaccine (1 of 2) 02/02/2009    Diabetic retinal exam  06/17/2020    Flu vaccine (1) 09/01/2020    Diabetic foot exam  06/03/2021    A1C test (Diabetic or Prediabetic)  06/03/2021    Diabetic microalbuminuria test  06/03/2021    Lipid screen  06/11/2021    Potassium monitoring  08/28/2021    Creatinine monitoring  08/28/2021    Colon cancer screen colonoscopy  10/13/2026    DTaP/Tdap/Td vaccine (4 - Td) 05/07/2029    Pneumococcal 0-64 years Vaccine  Completed    Hepatitis C screen  Completed    Hepatitis A vaccine  Aged Out    Hib vaccine  Aged Out    Meningococcal (ACWY) vaccine  Aged Out       Subjective:      Review of Systems   Constitutional: Negative for chills and fever. HENT: Negative for rhinorrhea and sore throat. Eyes: Negative for discharge and redness. Respiratory: Negative for cough, shortness of breath and wheezing. Cardiovascular: Negative for chest pain and palpitations. Gastrointestinal: Negative for abdominal pain, diarrhea, nausea and vomiting. Genitourinary: Negative for dysuria and frequency. Musculoskeletal: Negative for arthralgias and myalgias. Neurological: Negative for dizziness, light-headedness and headaches. Psychiatric/Behavioral: Negative for sleep disturbance. Objective:     /80   Pulse 69   Ht 6' 5.04\" (1.957 m)   Wt (!) 407 lb 9.6 oz (184.9 kg)   SpO2 96%   BMI 48.28 kg/m²   Physical Exam  Vitals signs and nursing note reviewed. Constitutional:       General: He is not in acute distress. Appearance: He is well-developed. He is not ill-appearing.    HENT: for 30 days. Dispense:  30 capsule     Refill:  2       Patient given educationalmaterials - see patient instructions. Discussed use, benefit, and side effectsof prescribed medications. All patient questions answered. Pt voiced understanding. Reviewed health maintenance. Instructed to continue current medications, diet andexercise. Patient agreed with treatment plan. Follow up as directed.      Electronicallysigned by Kika Kerr MD on 9/10/2020 at 1:25 PM

## 2020-09-11 LAB — SURGICAL PATHOLOGY REPORT: NORMAL

## 2020-09-25 ENCOUNTER — INITIAL CONSULT (OUTPATIENT)
Dept: ONCOLOGY | Age: 60
End: 2020-09-25
Payer: COMMERCIAL

## 2020-09-25 ENCOUNTER — TELEPHONE (OUTPATIENT)
Dept: ONCOLOGY | Age: 60
End: 2020-09-25

## 2020-09-25 VITALS
HEART RATE: 74 BPM | RESPIRATION RATE: 20 BRPM | HEIGHT: 77 IN | TEMPERATURE: 98 F | WEIGHT: 315 LBS | BODY MASS INDEX: 37.19 KG/M2 | SYSTOLIC BLOOD PRESSURE: 132 MMHG | DIASTOLIC BLOOD PRESSURE: 91 MMHG

## 2020-09-25 PROCEDURE — 99245 OFF/OP CONSLTJ NEW/EST HI 55: CPT | Performed by: INTERNAL MEDICINE

## 2020-09-25 PROCEDURE — 99201 HC NEW PT, E/M LEVEL 1: CPT | Performed by: INTERNAL MEDICINE

## 2020-09-25 RX ORDER — LIDOCAINE HYDROCHLORIDE 20 MG/ML
JELLY TOPICAL ONCE
Status: CANCELLED | OUTPATIENT
Start: 2020-10-16

## 2020-09-25 RX ORDER — LIDOCAINE HYDROCHLORIDE 20 MG/ML
JELLY TOPICAL ONCE
Status: CANCELLED | OUTPATIENT
Start: 2020-10-09

## 2020-09-25 RX ORDER — LIDOCAINE HYDROCHLORIDE 20 MG/ML
JELLY TOPICAL ONCE
Status: CANCELLED | OUTPATIENT
Start: 2020-11-06

## 2020-09-25 RX ORDER — LIDOCAINE HYDROCHLORIDE 20 MG/ML
JELLY TOPICAL ONCE
Status: CANCELLED | OUTPATIENT
Start: 2020-10-30

## 2020-09-25 RX ORDER — LIDOCAINE HYDROCHLORIDE 20 MG/ML
JELLY TOPICAL ONCE
Status: CANCELLED | OUTPATIENT
Start: 2020-10-23

## 2020-09-25 RX ORDER — LIDOCAINE HYDROCHLORIDE 20 MG/ML
JELLY TOPICAL ONCE
Status: CANCELLED | OUTPATIENT
Start: 2020-11-13

## 2020-09-25 NOTE — PROGRESS NOTES
_               Mr. Dmitri Gutierrez is a very pleasant 63 y.o. male with history of multiple co morbidities as listed. Patient is referred for further management of noninvasive bladder cancer. Patient had history of bladder cancer in 2009. Treated with BCG bladder instillation. He was in remission since then. Last cystoscopy was about 4 years ago. Patient was referred back to his urologist for screening cystoscopy. He did not have any symptoms. No hematuria. No burning sensation. No urgency. No abdominal pain. No weight loss or decreased appetite. No fever or night sweats. No chest pain or shortness of breath. No other complaints. Patient had repeated cystoscopy and pathology showed noninvasive urothelial carcinoma from the lateral wall of the bladder. Patient is referred for further management. No history of alcohol drinking. Patient quit smoking 20 years ago. [place] PAST MEDICAL HISTORY: has a past medical history of Arthritis, Cancer ([facility] [address] Depression, Diabetes mellitus ([facility] [address] [place] (hard of hearing), Hypertension, Obesity, SOB (shortness of breath) on exertion, Umbilical hernia, and Wears glasses. PAST SURGICAL HISTORY: has a past surgical history that includes Cystocopy (5/21/14); Cystocopy (8/21/14); Cystoscopy (11-21-14); Colonoscopy (3045); repair umbilical SGVR,0+S/G,CGOPMG (N/A, 6/13/2018); Cystoscopy (N/A, 8/27/2019); Cystoscopy (N/A, 8/21/2020); and Cystoscopy (N/A, 9/8/2020). CURRENT MEDICATIONS:  has a current medication list which includes the following prescription(s): phentermine-topiramate, hydrochlorothiazide, ibuprofen, medihoney wound/burn dressing, metformin, carvedilol, albuterol sulfate hfa, lancets, aspirin, and multiple vitamins-minerals. ALLERGIES:  has No Known Allergies. FAMILY HISTORY: Brother had bladder cancer. Father had lung cancer.   Otherwise negative for any hematological or oncological conditions. SOCIAL HISTORY:  reports that he quit smoking about 19 years ago. His smoking use included cigarettes. He started smoking about 43 years ago. He has a 20.00 pack-year smoking history. He quit smokeless tobacco use about 16 years ago. He reports current alcohol use. He reports that he does not use drugs. REVIEW OF SYSTEMS:     · General: No weakness or fatigue. No unanticipated weight loss or decreased appetite. No fever or chills. · Eyes: No blurred vision, eye pain or double vision. · Ears: Hearing difficulties. · Throat: No sore throat, problems with swallowing or dysphagia. · Respiratory: No cough, sputum or hemoptysis. No shortness of breath. No pleuritic chest pain. · Cardiovascular: No chest pain, orthopnea or PND. No lower extremity edema. No palpitation. · Gastrointestinal: No problems with swallowing. No abdominal pain or bloating. No nausea or vomiting. No diarrhea or constipation. No GI bleeding. · Genitourinary: No dysuria, hematuria, frequency or urgency. · Musculoskeletal: No muscle aches or pains. No limitation of movement. No back pain. No gait disturbance, No joint complaints. · Dermatologic: No skin rashes or pruritus. No skin lesions or discolorations. · Psychiatric: No depression, anxiety, or stress or signs of schizophrenia. No change in mood or affect. · Hematologic: No history of bleeding tendency. No bruises or ecchymosis. No history of clotting problems. · Infectious disease: No fever, chills or frequent infections. · Endocrine: No polydipsia or polyuria. No temperature intolerance. · Neurologic: No headaches or dizziness. No weakness or numbness of the extremities. No changes in balance, coordination,  memory, mentation, behavior. · Allergic/Immunologic: No nasal congestion or hives. No repeated infections. PHYSICAL EXAM:  The patient is not in acute distress.   Vital signs: Blood pressure (!) 132/91, pulse with BCG bladder instillation    PLAN: For more than 60 minutes of face to face discussion, I explained to the patient the nature of bladder cancer, staging, prognosis and treatment. Obviously patient is presenting with early stage noninvasive bladder cancer. No invasive component. He had past history of bladder cancer in 2009. Considering the long duration between the 2 events I think it is more logical to consider the current bladder cancer as a new primary cancer rather than recurrence from previous. My recommendations would be for BCG bladder instillation weekly for 6 weeks followed by evaluation with cystoscopy. If he continues to have residual disease, he will be treated again along with maintenance BCG treatment. Otherwise observation would be reasonable after initial induction treatment. Benefits and side effects with treatment were explained. He agreed. Patient's questions were answered to the best of his satisfaction and he verbalized full understanding and agreement.

## 2020-09-25 NOTE — TELEPHONE ENCOUNTER
Start BCG bladder instillation weekly for 6 weeks  RV 3-4 weeks (on a treatment day)  *copy of AVS given to Judit Earl, , to obtain authorization, contact pt for appt-pt informed, understood

## 2020-10-05 ENCOUNTER — TELEPHONE (OUTPATIENT)
Dept: PRIMARY CARE CLINIC | Age: 60
End: 2020-10-05

## 2020-10-05 ENCOUNTER — TELEPHONE (OUTPATIENT)
Dept: INFUSION THERAPY | Age: 60
End: 2020-10-05

## 2020-10-05 NOTE — TELEPHONE ENCOUNTER
Chemo orders received, ht 77\", wt 395lbs, and bsa 3.12 verified.    Dose of chemotherapy verified;  BCG 50mg flat dose

## 2020-10-05 NOTE — TELEPHONE ENCOUNTER
BEAN with instructions listed below. Asked pt to call the office back, so we know he received the message.

## 2020-10-05 NOTE — TELEPHONE ENCOUNTER
Pt wife calling and states that he has had a cystoscope about 2x weeks ago for a bladder wash and has had no spotting or any problems. She states that last night he complained of discomfort and passed a blood clot. She states that the pt sent her a picture of the blood in the toilet from today (tued the water red). She states that she tried to get a hold of Dr. [facility] but only could leave a voicemail. Please advise.

## 2020-10-05 NOTE — TELEPHONE ENCOUNTER
Chemotherapy orders received:    Ht=77 inches  Dx=674 lbs  BSA=3.12  Chemotherapy doses verified:    BCG 50 mg flat dose   Tank Hobson RN

## 2020-10-05 NOTE — TELEPHONE ENCOUNTER
As long as cystoscope was normal, would increase fluids only to flush out any remaining clots, hold any blood thinners.   If bleeding too much, getting light headed, dizzy- would go to ER

## 2020-10-06 ENCOUNTER — TELEPHONE (OUTPATIENT)
Dept: ONCOLOGY | Age: 60
End: 2020-10-06

## 2020-10-06 NOTE — TELEPHONE ENCOUNTER
Called pt and LM notifying pt that the teach that was scheduled for 10/7 has been cancelled due to pt having tx back on 2009.  When pt calls back he needs to be given his schedule for 10/9/20 @ 8am

## 2020-10-06 NOTE — TELEPHONE ENCOUNTER
Patient's wife notified that he should continue the Hydrocholothiazide and Qsymia- Verbalized Understanding present

## 2020-10-09 ENCOUNTER — HOSPITAL ENCOUNTER (OUTPATIENT)
Dept: INFUSION THERAPY | Age: 60
Discharge: HOME | End: 2020-10-09
Payer: COMMERCIAL

## 2020-10-09 VITALS
SYSTOLIC BLOOD PRESSURE: 149 MMHG | DIASTOLIC BLOOD PRESSURE: 84 MMHG | RESPIRATION RATE: 18 BRPM | TEMPERATURE: 97.9 F | WEIGHT: 315 LBS | BODY MASS INDEX: 46.75 KG/M2 | HEART RATE: 81 BPM

## 2020-10-09 DIAGNOSIS — C67.2 MALIGNANT NEOPLASM OF LATERAL WALL OF URINARY BLADDER (HCC): Primary | ICD-10-CM

## 2020-10-09 PROCEDURE — 2580000003 HC RX 258: Performed by: INTERNAL MEDICINE

## 2020-10-09 PROCEDURE — 6370000000 HC RX 637 (ALT 250 FOR IP): Performed by: INTERNAL MEDICINE

## 2020-10-09 PROCEDURE — 6360000002 HC RX W HCPCS: Performed by: INTERNAL MEDICINE

## 2020-10-09 PROCEDURE — 51720 TREATMENT OF BLADDER LESION: CPT

## 2020-10-09 RX ORDER — LIDOCAINE HYDROCHLORIDE 20 MG/ML
JELLY TOPICAL ONCE
Status: COMPLETED | OUTPATIENT
Start: 2020-10-09 | End: 2020-10-09

## 2020-10-09 RX ADMIN — SODIUM CHLORIDE 50 MG: 9 INJECTION, SOLUTION INTRAVENOUS at 08:39

## 2020-10-09 RX ADMIN — LIDOCAINE HYDROCHLORIDE: 20 JELLY TOPICAL at 08:39

## 2020-10-09 NOTE — PROGRESS NOTES
Patient arrived for BCG 1 of 6 with wife   Pt was treated in 2009 at [facility], re educated on BCG protocol and cleaning after urination hand out given and [employer] given   Consent signed   12 F indwelling haider placed - 300 ml yellow urine drained   BCG instilled at 0850  Pt requested to keep haider in and clamped   Pt instructed to turn q 15 mins   Pt asked to be unclamped at 1010, 400 ml clear yellow urine drained   Pt ambulated to exit in stable condition   Return 10/16 BCG [address] [place] [address]

## 2020-10-16 ENCOUNTER — HOSPITAL ENCOUNTER (OUTPATIENT)
Dept: INFUSION THERAPY | Age: 60
Discharge: HOME | End: 2020-10-16
Payer: COMMERCIAL

## 2020-10-16 VITALS
HEART RATE: 78 BPM | RESPIRATION RATE: 18 BRPM | DIASTOLIC BLOOD PRESSURE: 81 MMHG | TEMPERATURE: 98.1 F | SYSTOLIC BLOOD PRESSURE: 121 MMHG

## 2020-10-16 DIAGNOSIS — C67.2 MALIGNANT NEOPLASM OF LATERAL WALL OF URINARY BLADDER (HCC): Primary | ICD-10-CM

## 2020-10-16 PROCEDURE — 6360000002 HC RX W HCPCS: Performed by: INTERNAL MEDICINE

## 2020-10-16 PROCEDURE — 2580000003 HC RX 258: Performed by: INTERNAL MEDICINE

## 2020-10-16 PROCEDURE — 6370000000 HC RX 637 (ALT 250 FOR IP): Performed by: INTERNAL MEDICINE

## 2020-10-16 PROCEDURE — 51720 TREATMENT OF BLADDER LESION: CPT

## 2020-10-16 RX ORDER — LIDOCAINE HYDROCHLORIDE 20 MG/ML
JELLY TOPICAL ONCE
Status: COMPLETED | OUTPATIENT
Start: 2020-10-16 | End: 2020-10-16

## 2020-10-16 RX ADMIN — SODIUM CHLORIDE 50 MG: 9 INJECTION, SOLUTION INTRAVENOUS at 08:29

## 2020-10-16 RX ADMIN — LIDOCAINE HYDROCHLORIDE: 20 JELLY TOPICAL at 08:15

## 2020-10-16 NOTE — PROGRESS NOTES
16F haider inserted with urine noted @ 8:15. Tolerates very well. Haider removed 10:15 per pt request.  Denies any problems. Tolerated very well. Will return 10/23 for Dr visit and treatment.

## 2020-10-23 ENCOUNTER — OFFICE VISIT (OUTPATIENT)
Dept: ONCOLOGY | Age: 60
End: 2020-10-23
Payer: COMMERCIAL

## 2020-10-23 ENCOUNTER — HOSPITAL ENCOUNTER (OUTPATIENT)
Dept: INFUSION THERAPY | Age: 60
Discharge: HOME | End: 2020-10-23
Payer: COMMERCIAL

## 2020-10-23 ENCOUNTER — TELEPHONE (OUTPATIENT)
Dept: ONCOLOGY | Age: 60
End: 2020-10-23

## 2020-10-23 VITALS — TEMPERATURE: 97.9 F | HEART RATE: 78 BPM | SYSTOLIC BLOOD PRESSURE: 129 MMHG | DIASTOLIC BLOOD PRESSURE: 80 MMHG

## 2020-10-23 VITALS
DIASTOLIC BLOOD PRESSURE: 81 MMHG | TEMPERATURE: 97.9 F | WEIGHT: 315 LBS | HEART RATE: 78 BPM | BODY MASS INDEX: 46.72 KG/M2 | SYSTOLIC BLOOD PRESSURE: 129 MMHG

## 2020-10-23 DIAGNOSIS — C67.2 MALIGNANT NEOPLASM OF LATERAL WALL OF URINARY BLADDER (HCC): Primary | ICD-10-CM

## 2020-10-23 PROCEDURE — 99214 OFFICE O/P EST MOD 30 MIN: CPT | Performed by: INTERNAL MEDICINE

## 2020-10-23 PROCEDURE — 99211 OFF/OP EST MAY X REQ PHY/QHP: CPT | Performed by: INTERNAL MEDICINE

## 2020-10-23 PROCEDURE — 2580000003 HC RX 258: Performed by: INTERNAL MEDICINE

## 2020-10-23 PROCEDURE — 6360000002 HC RX W HCPCS: Performed by: INTERNAL MEDICINE

## 2020-10-23 PROCEDURE — 6370000000 HC RX 637 (ALT 250 FOR IP): Performed by: INTERNAL MEDICINE

## 2020-10-23 PROCEDURE — 51720 TREATMENT OF BLADDER LESION: CPT

## 2020-10-23 RX ORDER — TAMSULOSIN HYDROCHLORIDE 0.4 MG/1
0.4 CAPSULE ORAL DAILY
Qty: 30 CAPSULE | Refills: 3 | Status: SHIPPED | OUTPATIENT
Start: 2020-10-23 | End: 2021-11-05 | Stop reason: SDUPTHER

## 2020-10-23 RX ORDER — LIDOCAINE HYDROCHLORIDE 20 MG/ML
JELLY TOPICAL ONCE
Status: COMPLETED | OUTPATIENT
Start: 2020-10-23 | End: 2020-10-23

## 2020-10-23 RX ADMIN — SODIUM CHLORIDE 50 MG: 9 INJECTION, SOLUTION INTRAVENOUS at 08:31

## 2020-10-23 RX ADMIN — LIDOCAINE HYDROCHLORIDE: 20 JELLY TOPICAL at 08:03

## 2020-10-23 NOTE — PROGRESS NOTES
_        Chief Complaint   Patient presents with    Follow-up     review status of disease    Other     Having a hard time going pee    Other     has leeking issues     DIAGNOSIS:        Stage 0 urothelial carcinoma of the lateral wall of the urinary bladder. Past history of bladder cancer in 2009 treated with BCG bladder instillation     CURRENT THERAPY:         BCG bladder instillation started October 9, 2020    BRIEF CASE HISTORY:      Mr. Hugo Montero is a very pleasant 63 y.o. male with history of multiple co morbidities as listed. Patient is referred for further management of noninvasive bladder cancer. Patient had history of bladder cancer in 2009. Treated with BCG bladder instillation. He was in remission since then. Last cystoscopy was about 4 years ago. Patient was referred back to his urologist for screening cystoscopy. He did not have any symptoms. No hematuria. No burning sensation. No urgency. No abdominal pain. No weight loss or decreased appetite. No fever or night sweats. No chest pain or shortness of breath. No other complaints. Patient had repeated cystoscopy and pathology showed noninvasive urothelial carcinoma from the lateral wall of the bladder. Patient is referred for further management. No history of alcohol drinking. Patient quit smoking 20 years ago. .     INTERIM HISTORY:   Patient seen for follow-up bladder cancer. Started on BCG bladder instillation on October 9, 2020. The patient had slight urine retention after the treatment. Otherwise possibly fairly well. No hematuria. No burning sensation. No nausea or vomiting. No fever. No other complaints.       PAST MEDICAL HISTORY: has a past medical history of Arthritis, Cancer ([address] Depression, Diabetes mellitus ([address] [place] (hard of hearing), Hypertension, Obesity, SOB (shortness of breath) on exertion, Umbilical hernia, and Wears glasses. PAST SURGICAL HISTORY: has a past surgical history that includes Cystocopy (5/21/14); Cystocopy (8/21/14); Cystoscopy (11-21-14); Colonoscopy (4378); repair umbilical CFWA,7+Y/U,WVARKT (N/A, 6/13/2018); Cystoscopy (N/A, 8/27/2019); Cystoscopy (N/A, 8/21/2020); and Cystoscopy (N/A, 9/8/2020). CURRENT MEDICATIONS:  has a current medication list which includes the following prescription(s): tamsulosin, hydrochlorothiazide, ibuprofen, medihoney wound/burn dressing, metformin, carvedilol, albuterol sulfate hfa, lancets, aspirin, and multiple vitamins-minerals. ALLERGIES:  has No Known Allergies. FAMILY HISTORY: Brother had bladder cancer. Father had lung cancer. Otherwise negative for any hematological or oncological conditions. SOCIAL HISTORY:  reports that he quit smoking about 19 years ago. His smoking use included cigarettes. He started smoking about 43 years ago. He has a 20.00 pack-year smoking history. He quit smokeless tobacco use about 16 years ago. He reports current alcohol use. He reports that he does not use drugs. REVIEW OF SYSTEMS:     · General: No weakness or fatigue. No unanticipated weight loss or decreased appetite. No fever or chills. · Eyes: No blurred vision, eye pain or double vision. · Ears: Hearing difficulties. · Throat: No sore throat, problems with swallowing or dysphagia. · Respiratory: No cough, sputum or hemoptysis. No shortness of breath. No pleuritic chest pain. · Cardiovascular: No chest pain, orthopnea or PND. No lower extremity edema. No palpitation. · Gastrointestinal: No problems with swallowing. No abdominal pain or bloating. No nausea or vomiting. No diarrhea or constipation. No GI bleeding. · Genitourinary: No dysuria, hematuria, frequency or urgency. · Musculoskeletal: No muscle aches or pains. No limitation of movement. No back pain. No gait disturbance, No joint complaints. · Dermatologic: No skin rashes or pruritus.  No skin lesions or discolorations. · Psychiatric: No depression, anxiety, or stress or signs of schizophrenia. No change in mood or affect. · Hematologic: No history of bleeding tendency. No bruises or ecchymosis. No history of clotting problems. · Infectious disease: No fever, chills or frequent infections. · Endocrine: No polydipsia or polyuria. No temperature intolerance. · Neurologic: No headaches or dizziness. No weakness or numbness of the extremities. No changes in balance, coordination,  memory, mentation, behavior. · Allergic/Immunologic: No nasal congestion or hives. No repeated infections. PHYSICAL EXAM:  The patient is not in acute distress. Vital signs: Blood pressure 129/81, pulse 78, temperature 97.9 °F (36.6 °C), temperature source Oral, weight (!) 394 lb (178.7 kg). General appearance - well appearing, not in pain or distress. Morbidly obese. Mental status - good mood, alert and oriented  Eyes - pupils equal and reactive, extraocular eye movements intact  Ears - bilateral TM's and external ear canals normal.  Difficulty hearing. Using hearing aids.   Nose - normal and patent, no erythema, discharge or polyps  Mouth - mucous membranes moist, pharynx normal without lesions  Neck - supple, no significant adenopathy  Lymphatics - no palpable lymphadenopathy, no hepatosplenomegaly  Chest - clear to auscultation, no wheezes, rales or rhonchi, symmetric air entry  Heart - normal rate, regular rhythm, normal S1, S2, no murmurs, rubs, clicks or gallops  Abdomen - soft, nontender, nondistended, no masses or organomegaly  Neurological - alert, oriented, normal speech, no focal findings or movement disorder noted  Musculoskeletal - no joint tenderness, deformity or swelling  Extremities - peripheral pulses normal, no pedal edema, no clubbing or cyanosis  Skin - normal coloration and turgor, no rashes, no suspicious skin lesions noted     Review of Diagnostic data:   Lab Results   Component Value Date    WBC 11.3 (H) 08/28/2020    HGB 14.6 08/28/2020    HCT 40.9 (L) 08/28/2020    MCV 89.0 08/28/2020     08/28/2020       Chemistry        Component Value Date/Time     08/28/2020 1620    K 4.6 08/28/2020 1620     08/28/2020 1620    CO2 33 (H) 08/28/2020 1620    BUN 14 08/28/2020 1620    CREATININE 0.78 08/28/2020 1620        Component Value Date/Time    CALCIUM 9.9 08/28/2020 1620    ALKPHOS 65 06/11/2020 1023    AST 32 06/11/2020 1023    ALT 42 (H) 06/11/2020 1023    BILITOT 0.6 06/11/2020 1023            IMPRESSION:   Stage 0 urothelial carcinoma of the lateral wall of the urinary bladder. Past history of bladder cancer in 2009 treated with BCG bladder instillation    PLAN:   Obviously patient is presenting with early stage noninvasive bladder cancer. No invasive component. He had past history of bladder cancer in 2009. Considering the long duration between the 2 events I think it is more logical to consider the current bladder cancer as a new primary cancer rather than recurrence from previous. Started on BCG bladder instillation weekly for 6 weeks to be followed by evaluation with cystoscopy. If he continues to have residual disease, he will be treated again along with maintenance BCG treatment. Otherwise observation would be reasonable after initial induction treatment. Patient is doing fairly well so far with no major side effects. I think the urine retention could be related to catheterization. I will prescribe Flomax. We will continue to monitor. Patient's questions were answered to the best of his satisfaction and he verbalized full understanding and agreement.

## 2020-10-30 ENCOUNTER — HOSPITAL ENCOUNTER (OUTPATIENT)
Dept: INFUSION THERAPY | Age: 60
Discharge: HOME | End: 2020-10-30
Payer: COMMERCIAL

## 2020-10-30 VITALS
RESPIRATION RATE: 16 BRPM | DIASTOLIC BLOOD PRESSURE: 81 MMHG | SYSTOLIC BLOOD PRESSURE: 131 MMHG | HEART RATE: 80 BPM | TEMPERATURE: 98.1 F

## 2020-10-30 DIAGNOSIS — C67.2 MALIGNANT NEOPLASM OF LATERAL WALL OF URINARY BLADDER (HCC): Primary | ICD-10-CM

## 2020-10-30 PROCEDURE — 2580000003 HC RX 258: Performed by: INTERNAL MEDICINE

## 2020-10-30 PROCEDURE — 6370000000 HC RX 637 (ALT 250 FOR IP): Performed by: INTERNAL MEDICINE

## 2020-10-30 PROCEDURE — 6360000002 HC RX W HCPCS: Performed by: INTERNAL MEDICINE

## 2020-10-30 PROCEDURE — 51720 TREATMENT OF BLADDER LESION: CPT

## 2020-10-30 RX ORDER — LIDOCAINE HYDROCHLORIDE 20 MG/ML
JELLY TOPICAL ONCE
Status: COMPLETED | OUTPATIENT
Start: 2020-10-30 | End: 2020-10-30

## 2020-10-30 RX ADMIN — LIDOCAINE HYDROCHLORIDE: 20 JELLY TOPICAL at 08:18

## 2020-10-30 RX ADMIN — SODIUM CHLORIDE 50 MG: 9 INJECTION, SOLUTION INTRAVENOUS at 08:32

## 2020-10-30 NOTE — PROGRESS NOTES
Pt here for D.22 BCG bladder instillation. Denies any complaints. States thinks Flomax is helping with urination. #16 F haider inserted into bladder with ease per sterile technique. Approx 150ml urine drained from bladder. BCG instilled into bladder with ease. Haider clamped, pt instructed to turn q 15 min. Pt requested haider to be unclamped after 90 min. States feels pressure/pain in lower back. Approx 200 ml urine drained from bladder. Haider removed. Pt d/c'd in stable condition. Returns next wk for C.5 BCG.

## 2020-11-06 ENCOUNTER — HOSPITAL ENCOUNTER (OUTPATIENT)
Dept: INFUSION THERAPY | Age: 60
Discharge: HOME | End: 2020-11-06
Payer: COMMERCIAL

## 2020-11-06 VITALS
HEART RATE: 82 BPM | SYSTOLIC BLOOD PRESSURE: 149 MMHG | TEMPERATURE: 98 F | RESPIRATION RATE: 18 BRPM | DIASTOLIC BLOOD PRESSURE: 96 MMHG

## 2020-11-06 DIAGNOSIS — C67.2 MALIGNANT NEOPLASM OF LATERAL WALL OF URINARY BLADDER (HCC): Primary | ICD-10-CM

## 2020-11-06 PROCEDURE — 6370000000 HC RX 637 (ALT 250 FOR IP): Performed by: INTERNAL MEDICINE

## 2020-11-06 PROCEDURE — 51720 TREATMENT OF BLADDER LESION: CPT

## 2020-11-06 PROCEDURE — 6360000002 HC RX W HCPCS: Performed by: INTERNAL MEDICINE

## 2020-11-06 PROCEDURE — 2580000003 HC RX 258: Performed by: INTERNAL MEDICINE

## 2020-11-06 RX ORDER — LIDOCAINE HYDROCHLORIDE 20 MG/ML
JELLY TOPICAL ONCE
Status: COMPLETED | OUTPATIENT
Start: 2020-11-06 | End: 2020-11-06

## 2020-11-06 RX ADMIN — SODIUM CHLORIDE 50 MG: 9 INJECTION, SOLUTION INTRAVENOUS at 09:04

## 2020-11-06 RX ADMIN — LIDOCAINE HYDROCHLORIDE: 20 JELLY TOPICAL at 08:14

## 2020-11-06 NOTE — PROGRESS NOTES
Pt here for BCG bladder instillation. #16fr haider catheter inserted per protocol. 225ml clear yellow urine drained. BCG instilled and haider clamped per pt request. . Procedure tolerated well. Pt instructed to turn every 15 minutes from back to side-stomach-side then back again. Encouraged to increase oral fluids for the next 24-48 hours. Pt encouraged not to urinate for 2 hours after bladder instillation. Pt verbalized understanding. Pt was treated without incident and d/c'd in stable condition. Pt returns on 11-13-20 for next BCG.

## 2020-11-10 ENCOUNTER — OFFICE VISIT (OUTPATIENT)
Dept: PRIMARY CARE CLINIC | Age: 60
End: 2020-11-10
Payer: COMMERCIAL

## 2020-11-10 VITALS
HEIGHT: 77 IN | SYSTOLIC BLOOD PRESSURE: 130 MMHG | OXYGEN SATURATION: 96 % | HEART RATE: 84 BPM | TEMPERATURE: 97.7 F | WEIGHT: 315 LBS | DIASTOLIC BLOOD PRESSURE: 90 MMHG | BODY MASS INDEX: 37.19 KG/M2

## 2020-11-10 PROCEDURE — 99213 OFFICE O/P EST LOW 20 MIN: CPT | Performed by: FAMILY MEDICINE

## 2020-11-10 PROCEDURE — 3051F HG A1C>EQUAL 7.0%<8.0%: CPT | Performed by: FAMILY MEDICINE

## 2020-11-10 ASSESSMENT — PATIENT HEALTH QUESTIONNAIRE - PHQ9
SUM OF ALL RESPONSES TO PHQ9 QUESTIONS 1 & 2: 0
SUM OF ALL RESPONSES TO PHQ QUESTIONS 1-9: 0
1. LITTLE INTEREST OR PLEASURE IN DOING THINGS: 0
2. FEELING DOWN, DEPRESSED OR HOPELESS: 0

## 2020-11-10 ASSESSMENT — ENCOUNTER SYMPTOMS
SORE THROAT: 0
EYE DISCHARGE: 0
RHINORRHEA: 0
SHORTNESS OF BREATH: 0
ABDOMINAL PAIN: 0
VOMITING: 0
WHEEZING: 0
COUGH: 0
DIARRHEA: 0
EYE REDNESS: 0
NAUSEA: 0

## 2020-11-10 NOTE — PROGRESS NOTES
[facility] CARE  [address] [place] [id number]  Dept: [phone]    Fabián Muhammad is a 63 y.o. male who presents today for his medical conditions/complaintsas noted below. Chief Complaint   Patient presents with    3 Month Follow-Up    Other     pt would like to dicuss weightloss med        HPI:     HPI  Pt still getting chemo bladder washing done. Has one more treatment to go. Patient states had lost 16 pounds on the Qsymia. Patient has not been checking his blood sugars. Denies any low blood sugar reactions. Otherwise remarkable. Denies any melena or hematochezia. No lightheadedness or dizziness.     LDL Calculated (mg/dL)   Date Value   06/11/2020 101   04/27/2018 104   09/23/2016 103       (goal LDL is <100)   AST (U/L)   Date Value   06/11/2020 32     ALT (U/L)   Date Value   06/11/2020 42 (H)     BUN (mg/dL)   Date Value   08/28/2020 14     BP Readings from Last 3 Encounters:   11/10/20 (!) 130/90   11/06/20 (!) 149/96   10/30/20 131/81          (goal 120/80)    Past Medical History:   Diagnosis Date    Arthritis     Cancer ([address] 2012    bladder    Depression     Diabetes mellitus ([address]     borderline    [place] (hard of hearing)     bilateral hearing aids    Hypertension     Obesity     SOB (shortness of breath) on exertion     Umbilical hernia     Wears glasses       Past Surgical History:   Procedure Laterality Date    COLONOSCOPY  2017    normal    CYSTOSCOPY  5/21/14    several    CYSTOSCOPY  8/21/14    CYSTOSCOPY  11-21-14    CYSTOSCOPY N/A 8/27/2019    CYSTOSCOPY URETHRAL DILATATION performed by Yun Connolly MD at [facility] 8/21/2020    CYSTOSCOPY URETHRAL DILATATION WITH FISH TEST AND URINE CULTURE performed by Yun Connolly MD at [facility] 9/8/2020    CYSTOSCOPY AND RESECTION OF SMALL BLADDER TUMOR performed by Yun Connolly MD at [address],1+M/V,[facility] N/A 2018    HERNIA INCARCERATED UMBILICAL REPAIR W/MESH performed by Shakeel Montiel MD at [place] History   Problem Relation Age of Onset    Diabetes Father     Coronary Art Dis Father     Lung Cancer Father     Diabetes Mother     Hypertension Mother     Depression Mother     Cancer Brother         bladder       Social History     Tobacco Use    Smoking status: Former Smoker     Packs/day: 1.00     Years: 20.00     Pack years: 20.00     Types: Cigarettes     Start date: 1977     Last attempt to quit: 2001     Years since quittin.7    Smokeless tobacco: Former User     Quit date: 2004   Substance Use Topics    Alcohol use: Yes     Types: 1 Cans of beer per week     Comment: rare      Current Outpatient Medications   Medication Sig Dispense Refill    liraglutide-weight management 18 MG/3ML SOPN 0.6mg sc qd for 7 days, then 1.2mg sc qd for 7 days, then 1.8mg sc daily for 7 days, then 2.4mg sc daily 1 pen 5    tamsulosin (FLOMAX) 0.4 MG capsule Take 1 capsule by mouth daily 30 capsule 3    hydroCHLOROthiazide (HYDRODIURIL) 25 MG tablet Take 1 tablet by mouth every morning 90 tablet 3    ibuprofen (ADVIL;MOTRIN) 800 MG tablet Take 1 tablet by mouth every 8 hours as needed for Pain 60 tablet 0    metFORMIN (GLUCOPHAGE) 500 MG tablet Take 1 tablet by mouth daily (with breakfast) 90 tablet 1    carvedilol (COREG) 25 MG tablet Take 1 tablet by mouth 2 times daily (with meals) 180 tablet 3    albuterol sulfate HFA (PROAIR HFA) 108 (90 Base) MCG/ACT inhaler Inhale 2 puffs into the lungs every 6 hours as needed for Wheezing or Shortness of Breath 1 Inhaler 5    Lancets MISC 1 each by Does not apply route 2 times daily 300 each 3    Multiple Vitamins-Minerals (MULTIVITAMIN & MINERAL PO) Take 1 tablet by mouth daily.  aspirin 81 MG tablet Take 81 mg by mouth daily. No current facility-administered medications for this visit.       No Known Allergies    Health Pupils are equal, round, and reactive to light. Neck:      Thyroid: No thyromegaly. Trachea: No tracheal deviation. Cardiovascular:      Rate and Rhythm: Normal rate and regular rhythm. Heart sounds: Normal heart sounds. Pulmonary:      Effort: Pulmonary effort is normal. No respiratory distress. Breath sounds: Normal breath sounds. No wheezing. Lymphadenopathy:      Cervical: No cervical adenopathy. Skin:     General: Skin is warm. Findings: No rash. Neurological:      Mental Status: He is alert and oriented to person, place, and time. Psychiatric:         Mood and Affect: Mood normal.         Behavior: Behavior normal.         Thought Content: Thought content normal.         Assessment:       Diagnosis Orders   1. Diabetes mellitus type 2 in obese ([facility]     2. Morbid obesity ([facility]  liraglutide-weight management 18 MG/3ML SOPN   3. Essential hypertension          Plan:    trial of Saxenda for weight loss and diabetes  If not covered by insurance, would either go back to [facility] or consider Contrave. Finished treatment for bladder cancer. Return in about 3 months (around 2/10/2021). No orders of the defined types were placed in this encounter. Orders Placed This Encounter   Medications    liraglutide-weight management 18 MG/3ML SOPN     Si.6mg sc qd for 7 days, then 1.2mg sc qd for 7 days, then 1.8mg sc daily for 7 days, then 2.4mg sc daily     Dispense:  1 pen     Refill:  5       Patient given educationalmaterials - see patient instructions. Discussed use, benefit, and side effectsof prescribed medications. All patient questions answered. Pt voiced understanding. Reviewed health maintenance. Instructed to continue current medications, diet andexercise. Patient agreed with treatment plan. Follow up as directed.      Electronicallysigned by Yannick Cook MD on 11/10/2020 at 2:13 PM

## 2020-11-13 ENCOUNTER — OFFICE VISIT (OUTPATIENT)
Dept: ONCOLOGY | Age: 60
End: 2020-11-13
Payer: COMMERCIAL

## 2020-11-13 ENCOUNTER — TELEPHONE (OUTPATIENT)
Dept: ONCOLOGY | Age: 60
End: 2020-11-13

## 2020-11-13 ENCOUNTER — HOSPITAL ENCOUNTER (OUTPATIENT)
Dept: INFUSION THERAPY | Age: 60
Discharge: HOME | End: 2020-11-13
Payer: COMMERCIAL

## 2020-11-13 VITALS
SYSTOLIC BLOOD PRESSURE: 168 MMHG | DIASTOLIC BLOOD PRESSURE: 101 MMHG | WEIGHT: 315 LBS | TEMPERATURE: 97.6 F | BODY MASS INDEX: 46.29 KG/M2 | HEART RATE: 77 BPM | RESPIRATION RATE: 18 BRPM

## 2020-11-13 VITALS — SYSTOLIC BLOOD PRESSURE: 147 MMHG | DIASTOLIC BLOOD PRESSURE: 84 MMHG | HEART RATE: 72 BPM

## 2020-11-13 DIAGNOSIS — C67.2 MALIGNANT NEOPLASM OF LATERAL WALL OF URINARY BLADDER (HCC): Primary | ICD-10-CM

## 2020-11-13 PROCEDURE — 99211 OFF/OP EST MAY X REQ PHY/QHP: CPT | Performed by: INTERNAL MEDICINE

## 2020-11-13 PROCEDURE — 99214 OFFICE O/P EST MOD 30 MIN: CPT | Performed by: INTERNAL MEDICINE

## 2020-11-13 PROCEDURE — 2580000003 HC RX 258: Performed by: INTERNAL MEDICINE

## 2020-11-13 PROCEDURE — 6360000002 HC RX W HCPCS: Performed by: INTERNAL MEDICINE

## 2020-11-13 PROCEDURE — 51720 TREATMENT OF BLADDER LESION: CPT

## 2020-11-13 PROCEDURE — 6370000000 HC RX 637 (ALT 250 FOR IP): Performed by: INTERNAL MEDICINE

## 2020-11-13 RX ORDER — LIDOCAINE HYDROCHLORIDE 20 MG/ML
JELLY TOPICAL ONCE
Status: COMPLETED | OUTPATIENT
Start: 2020-11-13 | End: 2020-11-13

## 2020-11-13 RX ADMIN — SODIUM CHLORIDE 50 MG: 9 INJECTION, SOLUTION INTRAVENOUS at 08:36

## 2020-11-13 RX ADMIN — LIDOCAINE HYDROCHLORIDE: 20 JELLY TOPICAL at 08:07

## 2020-11-13 NOTE — TELEPHONE ENCOUNTER
Pt was seen today by Dr. Jesenia Perez. Per avs,    Proceed with BCG bladder instillation today  Follow up with Dr Betsey Multani  RV PRN    Referral to Dr. Betsey Multani faxed/confirmation received.

## 2020-11-13 NOTE — PROGRESS NOTES
Pt here for 6/6 BCG bladder instillations. #16 fr haider cath inserted with out difficulty using sterile technique. Approximately 200 ml clear minda urine drained. BCG instilled and haider clamped. Pt to turn every 15 min from back-side-stomach-side. Pt was seen by Dr. Daija Castillo at bedside. 1005 Pt requesting haider be unclamped. 1015 Haider d/c'd. 400 ml urine drained. Pt was treated without incident and d/c'd in stable condition.  Pt to follow up with urologist.

## 2020-11-13 NOTE — PROGRESS NOTES
_        Chief Complaint   Patient presents with    Follow-up     hard time urinating     DIAGNOSIS:        Stage 0 urothelial carcinoma of the lateral wall of the urinary bladder. Past history of bladder cancer in 2009 treated with BCG bladder instillation     CURRENT THERAPY:         BCG bladder instillation started October 9, 2020. Completed 6 weeks of bladder instillation on November 13, 2020. BRIEF CASE HISTORY:      Mr. Ana Rosa Kebede is a very pleasant 63 y.o. male with history of multiple co morbidities as listed. Patient is referred for further management of noninvasive bladder cancer. Patient had history of bladder cancer in 2009. Treated with BCG bladder instillation. He was in remission since then. Last cystoscopy was about 4 years ago. Patient was referred back to his urologist for screening cystoscopy. He did not have any symptoms. No hematuria. No burning sensation. No urgency. No abdominal pain. No weight loss or decreased appetite. No fever or night sweats. No chest pain or shortness of breath. No other complaints. Patient had repeated cystoscopy and pathology showed noninvasive urothelial carcinoma from the lateral wall of the bladder. Patient is referred for further management. No history of alcohol drinking. Patient quit smoking 20 years ago. .     INTERIM HISTORY:   Patient seen for follow-up bladder cancer. Started on BCG bladder instillation on October 9, 2020. The patient had slight urine retention after the treatment. Otherwise possibly fairly well. No hematuria. No burning sensation. No nausea or vomiting. No fever. No other complaints.       PAST MEDICAL HISTORY: has a past medical history of Arthritis, Cancer ([address] Depression, Diabetes mellitus ([address] [place] (hard of hearing), Hypertension, Obesity, SOB (shortness of breath) on exertion, Umbilical hernia, and Wears glasses. PAST SURGICAL HISTORY: has a past surgical history that includes Cystocopy (5/21/14); Cystocopy (8/21/14); Cystoscopy (11-21-14); Colonoscopy (9103); repair umbilical PNBV,9+H/A,VTKRZU (N/A, 6/13/2018); Cystoscopy (N/A, 8/27/2019); Cystoscopy (N/A, 8/21/2020); and Cystoscopy (N/A, 9/8/2020). CURRENT MEDICATIONS:  has a current medication list which includes the following prescription(s): tamsulosin, hydrochlorothiazide, ibuprofen, metformin, carvedilol, albuterol sulfate hfa, lancets, multiple vitamins-minerals, liraglutide-weight management, and aspirin. ALLERGIES:  has No Known Allergies. FAMILY HISTORY: Brother had bladder cancer. Father had lung cancer. Otherwise negative for any hematological or oncological conditions. SOCIAL HISTORY:  reports that he quit smoking about 19 years ago. His smoking use included cigarettes. He started smoking about 43 years ago. He has a 20.00 pack-year smoking history. He quit smokeless tobacco use about 16 years ago. He reports current alcohol use. He reports that he does not use drugs. REVIEW OF SYSTEMS:     · General: No weakness or fatigue. No unanticipated weight loss or decreased appetite. No fever or chills. · Eyes: No blurred vision, eye pain or double vision. · Ears: Hearing difficulties. · Throat: No sore throat, problems with swallowing or dysphagia. · Respiratory: No cough, sputum or hemoptysis. No shortness of breath. No pleuritic chest pain. · Cardiovascular: No chest pain, orthopnea or PND. No lower extremity edema. No palpitation. · Gastrointestinal: No problems with swallowing. No abdominal pain or bloating. No nausea or vomiting. No diarrhea or constipation. No GI bleeding. · Genitourinary: No dysuria, hematuria, frequency or urgency. · Musculoskeletal: No muscle aches or pains. No limitation of movement. No back pain. No gait disturbance, No joint complaints. · Dermatologic: No skin rashes or pruritus.  No skin lesions or discolorations. · Psychiatric: No depression, anxiety, or stress or signs of schizophrenia. No change in mood or affect. · Hematologic: No history of bleeding tendency. No bruises or ecchymosis. No history of clotting problems. · Infectious disease: No fever, chills or frequent infections. · Endocrine: No polydipsia or polyuria. No temperature intolerance. · Neurologic: No headaches or dizziness. No weakness or numbness of the extremities. No changes in balance, coordination,  memory, mentation, behavior. · Allergic/Immunologic: No nasal congestion or hives. No repeated infections. PHYSICAL EXAM:  The patient is not in acute distress. Vital signs: Blood pressure (!) 168/101, pulse 77, temperature 97.6 °F (36.4 °C), temperature source Oral, resp. rate 18, weight (!) 390 lb 12.8 oz (177.3 kg). General appearance - well appearing, not in pain or distress. Morbidly obese. Mental status - good mood, alert and oriented  Eyes - pupils equal and reactive, extraocular eye movements intact  Ears - bilateral TM's and external ear canals normal.  Difficulty hearing. Using hearing aids.   Nose - normal and patent, no erythema, discharge or polyps  Mouth - mucous membranes moist, pharynx normal without lesions  Neck - supple, no significant adenopathy  Lymphatics - no palpable lymphadenopathy, no hepatosplenomegaly  Chest - clear to auscultation, no wheezes, rales or rhonchi, symmetric air entry  Heart - normal rate, regular rhythm, normal S1, S2, no murmurs, rubs, clicks or gallops  Abdomen - soft, nontender, nondistended, no masses or organomegaly  Neurological - alert, oriented, normal speech, no focal findings or movement disorder noted  Musculoskeletal - no joint tenderness, deformity or swelling  Extremities - peripheral pulses normal, no pedal edema, no clubbing or cyanosis  Skin - normal coloration and turgor, no rashes, no suspicious skin lesions noted     Review of Diagnostic data: Lab Results   Component Value Date    WBC 11.3 (H) 08/28/2020    HGB 14.6 08/28/2020    HCT 40.9 (L) 08/28/2020    MCV 89.0 08/28/2020     08/28/2020       Chemistry        Component Value Date/Time     08/28/2020 1620    K 4.6 08/28/2020 1620     08/28/2020 1620    CO2 33 (H) 08/28/2020 1620    BUN 14 08/28/2020 1620    CREATININE 0.78 08/28/2020 1620        Component Value Date/Time    CALCIUM 9.9 08/28/2020 1620    ALKPHOS 65 06/11/2020 1023    AST 32 06/11/2020 1023    ALT 42 (H) 06/11/2020 1023    BILITOT 0.6 06/11/2020 1023            IMPRESSION:   Stage 0 urothelial carcinoma of the lateral wall of the urinary bladder. Past history of bladder cancer in 2009 treated with BCG bladder instillation    PLAN:   Obviously patient is presenting with early stage noninvasive bladder cancer. No invasive component. He had past history of bladder cancer in 2009. Considering the long duration between the 2 events I think it is more logical to consider the current bladder cancer as a new primary cancer rather than recurrence from previous. Started on BCG bladder instillation weekly for 6 weeks. It will be completed today November 13, 2020. Bladder instillation is to be followed by evaluation with cystoscopy. We will refer patient back to his urologist.    If he continues to have residual disease, he will be treated again along with maintenance BCG treatment. Otherwise observation would be reasonable after initial induction treatment. I will see him as needed. He will be seen especially so if repeated cystoscopy showed residual disease. Patient's questions were answered to the best of his satisfaction and he verbalized full understanding and agreement.

## 2020-11-20 ENCOUNTER — APPOINTMENT (OUTPATIENT)
Dept: GENERAL RADIOLOGY | Age: 60
DRG: 549 | End: 2020-11-20
Payer: COMMERCIAL

## 2020-11-20 ENCOUNTER — TELEPHONE (OUTPATIENT)
Dept: PRIMARY CARE CLINIC | Age: 60
End: 2020-11-20

## 2020-11-20 ENCOUNTER — HOSPITAL ENCOUNTER (INPATIENT)
Age: 60
LOS: 1 days | Discharge: HOME | DRG: 549 | End: 2020-11-23
Attending: EMERGENCY MEDICINE | Admitting: FAMILY MEDICINE
Payer: COMMERCIAL

## 2020-11-20 PROBLEM — N39.0 URINARY TRACT INFECTION IN MALE: Status: ACTIVE | Noted: 2020-11-20

## 2020-11-20 LAB
-: ABNORMAL
ABSOLUTE EOS #: 0 K/UL (ref 0–0.4)
ABSOLUTE IMMATURE GRANULOCYTE: ABNORMAL K/UL (ref 0–0.3)
ABSOLUTE LYMPH #: 2.66 K/UL (ref 1–4.8)
ABSOLUTE MONO #: 1.71 K/UL (ref 0.1–1.3)
AMORPHOUS: ABNORMAL
ANION GAP SERPL CALCULATED.3IONS-SCNC: 13 MMOL/L (ref 9–17)
BACTERIA: ABNORMAL
BASOPHILS # BLD: 0 % (ref 0–2)
BASOPHILS ABSOLUTE: 0 K/UL (ref 0–0.2)
BILIRUBIN URINE: NEGATIVE
BUN BLDV-MCNC: 14 MG/DL (ref 8–23)
BUN/CREAT BLD: ABNORMAL (ref 9–20)
C-REACTIVE PROTEIN: 210.8 MG/L (ref 0–5)
CALCIUM SERPL-MCNC: 9.7 MG/DL (ref 8.6–10.4)
CASTS UA: ABNORMAL /LPF
CHLORIDE BLD-SCNC: 97 MMOL/L (ref 98–107)
CO2: 28 MMOL/L (ref 20–31)
COLOR: ABNORMAL
COMMENT UA: ABNORMAL
CREAT SERPL-MCNC: 0.7 MG/DL (ref 0.7–1.2)
CRYSTALS, UA: ABNORMAL /HPF
DIFFERENTIAL TYPE: ABNORMAL
EOSINOPHILS RELATIVE PERCENT: 0 % (ref 0–4)
EPITHELIAL CELLS UA: ABNORMAL /HPF
GFR AFRICAN AMERICAN: >60 ML/MIN
GFR NON-AFRICAN AMERICAN: >60 ML/MIN
GFR SERPL CREATININE-BSD FRML MDRD: ABNORMAL ML/MIN/{1.73_M2}
GFR SERPL CREATININE-BSD FRML MDRD: ABNORMAL ML/MIN/{1.73_M2}
GLUCOSE BLD-MCNC: 165 MG/DL (ref 75–110)
GLUCOSE BLD-MCNC: 185 MG/DL (ref 75–110)
GLUCOSE BLD-MCNC: 187 MG/DL (ref 70–99)
GLUCOSE URINE: NEGATIVE
HCT VFR BLD CALC: 44.2 % (ref 41–53)
HEMOGLOBIN: 15.3 G/DL (ref 13.5–17.5)
IMMATURE GRANULOCYTES: ABNORMAL %
KETONES, URINE: ABNORMAL
LEUKOCYTE ESTERASE, URINE: ABNORMAL
LYMPHOCYTES # BLD: 14 % (ref 24–44)
MCH RBC QN AUTO: 31 PG (ref 26–34)
MCHC RBC AUTO-ENTMCNC: 34.5 G/DL (ref 31–37)
MCV RBC AUTO: 89.8 FL (ref 80–100)
MONOCYTES # BLD: 9 % (ref 1–7)
MORPHOLOGY: ABNORMAL
MUCUS: ABNORMAL
NITRITE, URINE: NEGATIVE
NRBC AUTOMATED: ABNORMAL PER 100 WBC
OTHER OBSERVATIONS UA: ABNORMAL
PDW BLD-RTO: 12.9 % (ref 11.5–14.9)
PH UA: 6 (ref 5–8)
PLATELET # BLD: 320 K/UL (ref 150–450)
PLATELET ESTIMATE: ABNORMAL
PMV BLD AUTO: 7.9 FL (ref 6–12)
POTASSIUM SERPL-SCNC: 3.7 MMOL/L (ref 3.7–5.3)
PROTEIN UA: ABNORMAL
RBC # BLD: 4.92 M/UL (ref 4.5–5.9)
RBC # BLD: ABNORMAL 10*6/UL
RBC UA: ABNORMAL /HPF
RENAL EPITHELIAL, UA: ABNORMAL /HPF
SEDIMENTATION RATE, ERYTHROCYTE: 17 MM (ref 0–20)
SEG NEUTROPHILS: 77 % (ref 36–66)
SEGMENTED NEUTROPHILS ABSOLUTE COUNT: 14.63 K/UL (ref 1.3–9.1)
SODIUM BLD-SCNC: 138 MMOL/L (ref 135–144)
SPECIFIC GRAVITY UA: 1.02 (ref 1–1.03)
TRICHOMONAS: ABNORMAL
TURBIDITY: ABNORMAL
URINE HGB: ABNORMAL
UROBILINOGEN, URINE: NORMAL
WBC # BLD: 19 K/UL (ref 3.5–11)
WBC # BLD: ABNORMAL 10*3/UL
WBC UA: ABNORMAL /HPF
YEAST: ABNORMAL

## 2020-11-20 PROCEDURE — 2580000003 HC RX 258: Performed by: EMERGENCY MEDICINE

## 2020-11-20 PROCEDURE — G0378 HOSPITAL OBSERVATION PER HR: HCPCS

## 2020-11-20 PROCEDURE — 85025 COMPLETE CBC W/AUTO DIFF WBC: CPT

## 2020-11-20 PROCEDURE — 86140 C-REACTIVE PROTEIN: CPT

## 2020-11-20 PROCEDURE — 6370000000 HC RX 637 (ALT 250 FOR IP): Performed by: FAMILY MEDICINE

## 2020-11-20 PROCEDURE — 85652 RBC SED RATE AUTOMATED: CPT

## 2020-11-20 PROCEDURE — 80048 BASIC METABOLIC PNL TOTAL CA: CPT

## 2020-11-20 PROCEDURE — 73610 X-RAY EXAM OF ANKLE: CPT

## 2020-11-20 PROCEDURE — 6360000002 HC RX W HCPCS: Performed by: EMERGENCY MEDICINE

## 2020-11-20 PROCEDURE — 6370000000 HC RX 637 (ALT 250 FOR IP): Performed by: EMERGENCY MEDICINE

## 2020-11-20 PROCEDURE — 73030 X-RAY EXAM OF SHOULDER: CPT

## 2020-11-20 PROCEDURE — 96372 THER/PROPH/DIAG INJ SC/IM: CPT

## 2020-11-20 PROCEDURE — 73562 X-RAY EXAM OF KNEE 3: CPT

## 2020-11-20 PROCEDURE — 99284 EMERGENCY DEPT VISIT MOD MDM: CPT

## 2020-11-20 PROCEDURE — 87086 URINE CULTURE/COLONY COUNT: CPT

## 2020-11-20 PROCEDURE — 93971 EXTREMITY STUDY: CPT

## 2020-11-20 PROCEDURE — 82947 ASSAY GLUCOSE BLOOD QUANT: CPT

## 2020-11-20 PROCEDURE — 36415 COLL VENOUS BLD VENIPUNCTURE: CPT

## 2020-11-20 PROCEDURE — 96365 THER/PROPH/DIAG IV INF INIT: CPT

## 2020-11-20 PROCEDURE — 6360000002 HC RX W HCPCS: Performed by: FAMILY MEDICINE

## 2020-11-20 PROCEDURE — 81001 URINALYSIS AUTO W/SCOPE: CPT

## 2020-11-20 RX ORDER — ACETAMINOPHEN 325 MG/1
650 TABLET ORAL EVERY 6 HOURS PRN
Status: DISCONTINUED | OUTPATIENT
Start: 2020-11-20 | End: 2020-11-23 | Stop reason: HOSPADM

## 2020-11-20 RX ORDER — HYDROCODONE BITARTRATE AND ACETAMINOPHEN 5; 325 MG/1; MG/1
1 TABLET ORAL EVERY 6 HOURS PRN
Status: DISCONTINUED | OUTPATIENT
Start: 2020-11-20 | End: 2020-11-21 | Stop reason: SDUPTHER

## 2020-11-20 RX ORDER — SODIUM CHLORIDE 9 MG/ML
INJECTION, SOLUTION INTRAVENOUS CONTINUOUS
Status: CANCELLED | OUTPATIENT
Start: 2020-11-20

## 2020-11-20 RX ORDER — SODIUM CHLORIDE 9 MG/ML
INJECTION, SOLUTION INTRAVENOUS CONTINUOUS
Status: DISCONTINUED | OUTPATIENT
Start: 2020-11-20 | End: 2020-11-22

## 2020-11-20 RX ORDER — PROMETHAZINE HYDROCHLORIDE 25 MG/1
12.5 TABLET ORAL EVERY 6 HOURS PRN
Status: CANCELLED | OUTPATIENT
Start: 2020-11-20

## 2020-11-20 RX ORDER — ACETAMINOPHEN 650 MG/1
650 SUPPOSITORY RECTAL EVERY 6 HOURS PRN
Status: CANCELLED | OUTPATIENT
Start: 2020-11-20

## 2020-11-20 RX ORDER — ONDANSETRON 2 MG/ML
4 INJECTION INTRAMUSCULAR; INTRAVENOUS EVERY 6 HOURS PRN
Status: CANCELLED | OUTPATIENT
Start: 2020-11-20

## 2020-11-20 RX ORDER — CARVEDILOL 25 MG/1
25 TABLET ORAL 2 TIMES DAILY WITH MEALS
Status: DISCONTINUED | OUTPATIENT
Start: 2020-11-20 | End: 2020-11-23 | Stop reason: HOSPADM

## 2020-11-20 RX ORDER — SODIUM CHLORIDE 0.9 % (FLUSH) 0.9 %
10 SYRINGE (ML) INJECTION PRN
Status: CANCELLED | OUTPATIENT
Start: 2020-11-20

## 2020-11-20 RX ORDER — DEXTROSE MONOHYDRATE 25 G/50ML
12.5 INJECTION, SOLUTION INTRAVENOUS PRN
Status: DISCONTINUED | OUTPATIENT
Start: 2020-11-20 | End: 2020-11-23 | Stop reason: HOSPADM

## 2020-11-20 RX ORDER — ASPIRIN 81 MG/1
81 TABLET ORAL DAILY
Status: DISCONTINUED | OUTPATIENT
Start: 2020-11-20 | End: 2020-11-23 | Stop reason: HOSPADM

## 2020-11-20 RX ORDER — TAMSULOSIN HYDROCHLORIDE 0.4 MG/1
0.4 CAPSULE ORAL DAILY
Status: DISCONTINUED | OUTPATIENT
Start: 2020-11-20 | End: 2020-11-23 | Stop reason: HOSPADM

## 2020-11-20 RX ORDER — ZOLPIDEM TARTRATE 5 MG/1
5 TABLET ORAL NIGHTLY PRN
Status: DISCONTINUED | OUTPATIENT
Start: 2020-11-20 | End: 2020-11-23 | Stop reason: HOSPADM

## 2020-11-20 RX ORDER — NICOTINE POLACRILEX 4 MG
15 LOZENGE BUCCAL PRN
Status: DISCONTINUED | OUTPATIENT
Start: 2020-11-20 | End: 2020-11-23 | Stop reason: HOSPADM

## 2020-11-20 RX ORDER — DEXTROSE MONOHYDRATE 50 MG/ML
100 INJECTION, SOLUTION INTRAVENOUS PRN
Status: DISCONTINUED | OUTPATIENT
Start: 2020-11-20 | End: 2020-11-23 | Stop reason: HOSPADM

## 2020-11-20 RX ORDER — HYDROCODONE BITARTRATE AND ACETAMINOPHEN 5; 325 MG/1; MG/1
2 TABLET ORAL ONCE
Status: COMPLETED | OUTPATIENT
Start: 2020-11-20 | End: 2020-11-20

## 2020-11-20 RX ORDER — ACETAMINOPHEN 325 MG/1
650 TABLET ORAL EVERY 6 HOURS PRN
Status: CANCELLED | OUTPATIENT
Start: 2020-11-20

## 2020-11-20 RX ORDER — ALBUTEROL SULFATE 2.5 MG/3ML
2.5 SOLUTION RESPIRATORY (INHALATION) EVERY 6 HOURS PRN
Status: DISCONTINUED | OUTPATIENT
Start: 2020-11-20 | End: 2020-11-23 | Stop reason: HOSPADM

## 2020-11-20 RX ORDER — HYDROCHLOROTHIAZIDE 25 MG/1
25 TABLET ORAL EVERY MORNING
Status: DISCONTINUED | OUTPATIENT
Start: 2020-11-21 | End: 2020-11-23 | Stop reason: HOSPADM

## 2020-11-20 RX ORDER — SODIUM CHLORIDE 0.9 % (FLUSH) 0.9 %
10 SYRINGE (ML) INJECTION EVERY 12 HOURS SCHEDULED
Status: CANCELLED | OUTPATIENT
Start: 2020-11-20

## 2020-11-20 RX ORDER — POLYETHYLENE GLYCOL 3350 17 G/17G
17 POWDER, FOR SOLUTION ORAL DAILY PRN
Status: CANCELLED | OUTPATIENT
Start: 2020-11-20

## 2020-11-20 RX ORDER — ONDANSETRON 2 MG/ML
4 INJECTION INTRAMUSCULAR; INTRAVENOUS EVERY 6 HOURS PRN
Status: DISCONTINUED | OUTPATIENT
Start: 2020-11-20 | End: 2020-11-23 | Stop reason: HOSPADM

## 2020-11-20 RX ADMIN — INSULIN LISPRO 1 UNITS: 100 INJECTION, SOLUTION INTRAVENOUS; SUBCUTANEOUS at 21:08

## 2020-11-20 RX ADMIN — HYDROCODONE BITARTRATE AND ACETAMINOPHEN 1 TABLET: 5; 325 TABLET ORAL at 21:07

## 2020-11-20 RX ADMIN — CEFTRIAXONE SODIUM 1 G: 1 INJECTION, POWDER, FOR SOLUTION INTRAMUSCULAR; INTRAVENOUS at 13:28

## 2020-11-20 RX ADMIN — ENOXAPARIN SODIUM 40 MG: 40 INJECTION SUBCUTANEOUS at 21:07

## 2020-11-20 RX ADMIN — HYDROCODONE BITARTRATE AND ACETAMINOPHEN 2 TABLET: 5; 325 TABLET ORAL at 13:28

## 2020-11-20 ASSESSMENT — PAIN SCALES - GENERAL
PAINLEVEL_OUTOF10: 10
PAINLEVEL_OUTOF10: 10
PAINLEVEL_OUTOF10: 8
PAINLEVEL_OUTOF10: 8
PAINLEVEL_OUTOF10: 6

## 2020-11-20 ASSESSMENT — ENCOUNTER SYMPTOMS
ABDOMINAL PAIN: 0
CONSTIPATION: 0
NAUSEA: 0
DIARRHEA: 0
VOMITING: 0
BLOOD IN STOOL: 0
COUGH: 0
COLOR CHANGE: 0
SORE THROAT: 0
SHORTNESS OF BREATH: 0
TROUBLE SWALLOWING: 0
BACK PAIN: 0

## 2020-11-20 ASSESSMENT — PAIN DESCRIPTION - ORIENTATION
ORIENTATION: LEFT;RIGHT
ORIENTATION: RIGHT

## 2020-11-20 ASSESSMENT — PAIN DESCRIPTION - PAIN TYPE: TYPE: ACUTE PAIN

## 2020-11-20 ASSESSMENT — PAIN DESCRIPTION - DESCRIPTORS
DESCRIPTORS: ACHING
DESCRIPTORS: ACHING

## 2020-11-20 ASSESSMENT — PAIN DESCRIPTION - LOCATION
LOCATION: SHOULDER
LOCATION: KNEE;ANKLE;SHOULDER

## 2020-11-20 NOTE — FLOWSHEET NOTE
11/20/20 1738   Encounter Summary   Services provided to: Patient   Referral/Consult From: [facility]   (11/20/2020)   Complexity of Encounter Low   Length of Encounter 15 minutes   Spiritual Assessment Completed Yes   Routine   Type Initial   Assessment Coping   Intervention Prayer   Outcome Expressed gratitude

## 2020-11-20 NOTE — PROGRESS NOTES
Medication History completed:    New medications: None    Medications discontinued: Ibuprofen    Changes to dosing: None    Stated allergies: NKDA    Other pertinent information: Medications confirmed with patient and [address] medication vials.     Thank you,  Kiel Downs, PharmD Candidate [id number]

## 2020-11-20 NOTE — PROGRESS NOTES
Pt admitted per w\c to room 2055  Alert and oriented. Spouse at bedside. No complaints   Oriented to call system and room.

## 2020-11-20 NOTE — ED PROVIDER NOTES
[address]  eMERGENCY dEPARTMENT eNCOUnter    Pt Name: Nay Valencia  MRN: [id number]  [place]: 1960  Date of evaluation:11/20/20  PCP: Ricardo Heck MD    CHIEF COMPLAINT       Chief Complaint   Patient presents with    Joint Pain    Eye Problem       HISTORY OF PRESENT ILLNESS    Fabián LEESA Hernandez is a 63 y.o. male who presents with chief complaint of generalized fatigue. He states he recently finished chemotherapy for bladder cancer last week and has felt very fatigued ever since. For the past 2 to 3 days he has had pain in his left leg from his left knee down to his left ankle as well that is in his right shoulder. No falls or trauma. Pain is worse with moving his joints. Nothing really alleviates his symptoms. Also has been urinating more frequently than normal.  No fevers, chills. No back pain. He reports some incontinence because he cannot get to the bathroom quick enough at night. No incomplete bladder emptying. No bowel incontinence. No chest pain or difficulty breathing. No fevers. Symptoms are acute. Symptoms are moderate. Nothing really make symptoms better or worse. Patient has no other complaints at this time. REVIEW OF SYSTEMS       Review of Systems   Constitutional: Negative for chills, fatigue and fever. HENT: Negative for congestion, ear pain, sore throat and trouble swallowing. Eyes: Negative for visual disturbance. Respiratory: Negative for cough and shortness of breath. Cardiovascular: Negative for chest pain, palpitations and leg swelling. Gastrointestinal: Negative for abdominal pain, blood in stool, constipation, diarrhea, nausea and vomiting. Genitourinary: Positive for frequency. Negative for dysuria and flank pain. Musculoskeletal: Positive for arthralgias. Negative for back pain, myalgias and neck pain. Skin: Negative for color change, rash and wound. Neurological: Negative for dizziness, weakness, light-headedness, numbness and headaches. Psychiatric/Behavioral: Negative for confusion. All other systems reviewed and are negative. Negativein 10 essential Systems except as mentioned above and in the HPI. PAST MEDICAL HISTORY     Past Medical History:   Diagnosis Date    Arthritis     Cancer [facility]) 2012    bladder    Depression     Diabetes mellitus ([facility] [address]     borderline    [place] (hard of hearing)     bilateral hearing aids    Hypertension     Obesity     SOB (shortness of breath) on exertion     Umbilical hernia     Wears glasses          SURGICAL HISTORY      has a past surgical history that includes Cystocopy (5/21/14); Cystocopy (8/21/14); Cystoscopy (11-21-14); Colonoscopy (4774); repair umbilical DSMS,7+E/K,UHEGKT (N/A, 6/13/2018); Cystoscopy (N/A, 8/27/2019); Cystoscopy (N/A, 8/21/2020); and Cystoscopy (N/A, 9/8/2020). CURRENT MEDICATIONS       Current Discharge Medication List      CONTINUE these medications which have NOT CHANGED    Details   tamsulosin (FLOMAX) 0.4 MG capsule Take 1 capsule by mouth daily  Qty: 30 capsule, Refills: 3      hydroCHLOROthiazide (HYDRODIURIL) 25 MG tablet Take 1 tablet by mouth every morning  Qty: 90 tablet, Refills: 3      metFORMIN (GLUCOPHAGE) 500 MG tablet Take 1 tablet by mouth daily (with breakfast)  Qty: 90 tablet, Refills: 1      carvedilol (COREG) 25 MG tablet Take 1 tablet by mouth 2 times daily (with meals)  Qty: 180 tablet, Refills: 3    Associated Diagnoses: Essential hypertension      albuterol sulfate HFA (PROAIR HFA) 108 (90 Base) MCG/ACT inhaler Inhale 2 puffs into the lungs every 6 hours as needed for Wheezing or Shortness of Breath  Qty: 1 Inhaler, Refills: 5    Associated Diagnoses: Acute bronchitis, unspecified organism      aspirin 81 MG tablet Take 81 mg by mouth daily. Multiple Vitamins-Minerals (MULTIVITAMIN & MINERAL PO) Take 1 tablet by mouth daily.       Lancets MISC 1 each by Does not apply route 2 times daily  Qty: 300 each, Refills: 3    Associated Diagnoses: Diabetes mellitus type 2 in obese ([facility] [address]             ALLERGIES     has No Known Allergies. FAMILY HISTORY     He indicated that the status of his mother is unknown. He indicated that the status of his father is unknown. He indicated that the status of his brother is unknown.     family history includes Cancer in his brother; Coronary Art Dis in his father; Depression in his mother; Diabetes in his father and mother; Hypertension in his mother; Cleaster Libman in his father. SOCIAL HISTORY      reports that he quit smoking about 19 years ago. His smoking use included cigarettes. He started smoking about 43 years ago. He has a 20.00 pack-year smoking history. He quit smokeless tobacco use about 16 years ago. He reports current alcohol use. He reports that he does not use drugs. PHYSICAL EXAM     INITIAL VITALS:  height is 6' 5\" (1.956 m) and weight is 381 lb 6.3 oz (173 kg) (abnormal). His oral temperature is 98.2 °F (36.8 °C). His blood pressure is 137/81 and his pulse is 106. His respiration is 16 and oxygen saturation is 98%. Physical Exam  Vitals signs and nursing note reviewed. Constitutional:       General: He is not in acute distress. Appearance: He is obese. HENT:      Head: Normocephalic and atraumatic. Eyes:      Conjunctiva/sclera: Conjunctivae normal.      Pupils: Pupils are equal, round, and reactive to light. Neck:      Musculoskeletal: Neck supple. Cardiovascular:      Rate and Rhythm: Normal rate and regular rhythm. Heart sounds: Normal heart sounds. No murmur. Pulmonary:      Effort: Pulmonary effort is normal. No respiratory distress. Breath sounds: Normal breath sounds. Abdominal:      General: Bowel sounds are normal. There is no distension. Palpations: Abdomen is soft. Tenderness: There is no abdominal tenderness. Musculoskeletal:      Right shoulder: He exhibits bony tenderness. Left knee: He exhibits normal range of motion. Tenderness found. Left ankle: Tenderness. Achilles tendon normal.      Left lower leg: Edema present. Lymphadenopathy:      Cervical: No cervical adenopathy. Skin:     General: Skin is warm and dry. Findings: No rash. Neurological:      Mental Status: He is alert and oriented to person, place, and time. Psychiatric:         Judgment: Judgment normal.           DIFFERENTIAL DIAGNOSIS/MDM:   76-year-old male presents with pain in his left knee, left leg and ankle as well as right shoulder. He is afebrile, nontoxic, normal vital signs. No acute distress. On exam he does have some tenderness of his left leg including his left calf. His left leg is mildly warm to palpation. I do not suspect a septic joint. And seems to involve the whole left leg as well as his right shoulder. Suspect more likely polyarthropathy. DVT is a possibility as well as we will get an ultrasound. We will get x-rays. We will check some blood work, get a chest x-ray and urinalysis. DIAGNOSTIC RESULTS     EKG: All EKG's are interpreted by the Emergency Department Physician who either signs or Co-signs this chart in the absence of a cardiologist.        RADIOLOGY:   I directly visualized the following  images and reviewed the radiologist interpretations:  XR KNEE LEFT (3 VIEWS)   Final Result   Small joint effusion without acute osseous abnormality.          XR ANKLE LEFT (MIN 3 VIEWS)   Final Result   Minimal degenerative changes      Mild nonspecific lateral soft tissue fullness      No acute bony or joint space abnormality         XR SHOULDER RIGHT (MIN 2 VIEWS)   Final Result   Moderate degenerative changes of the right acromioclavicular joint         VL DUP LOWER EXTREMITY VENOUS LEFT   Final Result              ED BEDSIDE ULTRASOUND:      LABS:  Labs Reviewed   URINE RT REFLEX TO CULTURE - Abnormal; Notable for the following components:       Result Value    Color, UA DARK YELLOW (*)     Turbidity UA CLOUDY (*) Ketones, Urine TRACE (*)     Urine Hgb SMALL (*)     Protein, UA 2+ (*)     Leukocyte Esterase, Urine MOD (*)     All other components within normal limits   CBC WITH AUTO DIFFERENTIAL - Abnormal; Notable for the following components:    WBC 19.0 (*)     Seg Neutrophils 77 (*)     Lymphocytes 14 (*)     Monocytes 9 (*)     Segs Absolute 14.63 (*)     Absolute Mono # 1.71 (*)     All other components within normal limits   BASIC METABOLIC PANEL - Abnormal; Notable for the following components:    Glucose 187 (*)     Chloride 97 (*)     All other components within normal limits   C-REACTIVE PROTEIN - Abnormal; Notable for the following components:    .8 (*)     All other components within normal limits   MICROSCOPIC URINALYSIS - Abnormal; Notable for the following components:    Bacteria, UA MODERATE (*)     All other components within normal limits   POC GLUCOSE FINGERSTICK - Abnormal; Notable for the following components:    POC Glucose 185 (*)     All other components within normal limits   CULTURE, URINE   SEDIMENTATION RATE   POCT GLUCOSE   POCT GLUCOSE         EMERGENCY DEPARTMENT COURSE:   Vitals:    Vitals:    11/20/20 0933 11/20/20 1406 11/20/20 1424   BP: (!) 154/91 (!) 149/86 137/81   Pulse: 102 89 106   Resp: 16 16 16   Temp: 97 °F (36.1 °C) 97.4 °F (36.3 °C) 98.2 °F (36.8 °C)   TempSrc: Temporal  Oral   SpO2: 96% 97% 98%   Weight: (!) 392 lb (177.8 kg)  (!) 381 lb 6.3 oz (173 kg)   Height: 6' 5\" (1.956 m)  6' 5\" (1.956 m)     Patient found of urinary tract infection. He has a white count of 19,000. I spoke with patient at length about admission versus discharge. He is very stable. I think he would be appropriate for outpatient treatment however he does not feel comfortable going home. Does not feel like he can take care of himself at home does not feel like his wife can. I spoke with Dr. Coni Bella who accepted patient for admission for IV antibiotics.   Initial dose of IV Rocephin started. CRITICALCARE:      CONSULTS:  IP CONSULT TO PRIMARY CARE PROVIDER      PROCEDURES:      FINAL IMPRESSION      1. Urinary tract infection in male            DISPOSITION/PLAN   DISPOSITION Admitted 11/20/2020 01:23:16 PM          PATIENT REFERRED TO:  No follow-up provider specified.     DISCHARGE MEDICATIONS:  Current Discharge Medication List          (Please note that portions ofthis note were completed with a voice recognition program.  Efforts were made to edit the dictations but occasionally words are mis-transcribed.)    Yaa Multani DO  Attending Emergency Physician          Yaa Multani DO  11/20/20 5542

## 2020-11-21 PROBLEM — M00.9 SEPTIC JOINT OF LEFT KNEE JOINT (HCC): Status: ACTIVE | Noted: 2020-11-21

## 2020-11-21 LAB
ABSOLUTE EOS #: 0 K/UL (ref 0–0.4)
ABSOLUTE IMMATURE GRANULOCYTE: ABNORMAL K/UL (ref 0–0.3)
ABSOLUTE LYMPH #: 2.95 K/UL (ref 1–4.8)
ABSOLUTE MONO #: 0.98 K/UL (ref 0.1–1.3)
ANION GAP SERPL CALCULATED.3IONS-SCNC: 11 MMOL/L (ref 9–17)
BASOPHILS # BLD: 0 % (ref 0–2)
BASOPHILS ABSOLUTE: 0 K/UL (ref 0–0.2)
BUN BLDV-MCNC: 17 MG/DL (ref 8–23)
BUN/CREAT BLD: ABNORMAL (ref 9–20)
CALCIUM SERPL-MCNC: 8.4 MG/DL (ref 8.6–10.4)
CHLORIDE BLD-SCNC: 102 MMOL/L (ref 98–107)
CO2: 25 MMOL/L (ref 20–31)
CREAT SERPL-MCNC: 0.59 MG/DL (ref 0.7–1.2)
CRYSTALS, FLUID: NORMAL
CULTURE: NO GROWTH
DIFFERENTIAL TYPE: ABNORMAL
EOSINOPHILS RELATIVE PERCENT: 0 % (ref 0–4)
GFR AFRICAN AMERICAN: >60 ML/MIN
GFR NON-AFRICAN AMERICAN: >60 ML/MIN
GFR SERPL CREATININE-BSD FRML MDRD: ABNORMAL ML/MIN/{1.73_M2}
GFR SERPL CREATININE-BSD FRML MDRD: ABNORMAL ML/MIN/{1.73_M2}
GLUCOSE BLD-MCNC: 140 MG/DL (ref 75–110)
GLUCOSE BLD-MCNC: 170 MG/DL (ref 75–110)
GLUCOSE BLD-MCNC: 183 MG/DL (ref 70–99)
GLUCOSE BLD-MCNC: 188 MG/DL (ref 75–110)
HCT VFR BLD CALC: 37.4 % (ref 41–53)
HEMOGLOBIN: 12.9 G/DL (ref 13.5–17.5)
IMMATURE GRANULOCYTES: ABNORMAL %
LYMPHOCYTES # BLD: 18 % (ref 24–44)
Lab: NORMAL
MCH RBC QN AUTO: 30.9 PG (ref 26–34)
MCHC RBC AUTO-ENTMCNC: 34.5 G/DL (ref 31–37)
MCV RBC AUTO: 89.7 FL (ref 80–100)
MONOCYTES # BLD: 6 % (ref 1–7)
MORPHOLOGY: NORMAL
NRBC AUTOMATED: ABNORMAL PER 100 WBC
PDW BLD-RTO: 13.2 % (ref 11.5–14.9)
PLATELET # BLD: 262 K/UL (ref 150–450)
PLATELET ESTIMATE: ABNORMAL
PMV BLD AUTO: 7.7 FL (ref 6–12)
POTASSIUM SERPL-SCNC: 3.7 MMOL/L (ref 3.7–5.3)
RBC # BLD: 4.18 M/UL (ref 4.5–5.9)
RBC # BLD: ABNORMAL 10*6/UL
SEG NEUTROPHILS: 76 % (ref 36–66)
SEGMENTED NEUTROPHILS ABSOLUTE COUNT: 12.47 K/UL (ref 1.3–9.1)
SODIUM BLD-SCNC: 138 MMOL/L (ref 135–144)
SPECIMEN DESCRIPTION: NORMAL
SPECIMEN TYPE: NORMAL
WBC # BLD: 16.4 K/UL (ref 3.5–11)
WBC # BLD: ABNORMAL 10*3/UL

## 2020-11-21 PROCEDURE — 82947 ASSAY GLUCOSE BLOOD QUANT: CPT

## 2020-11-21 PROCEDURE — 36415 COLL VENOUS BLD VENIPUNCTURE: CPT

## 2020-11-21 PROCEDURE — 87075 CULTR BACTERIA EXCEPT BLOOD: CPT

## 2020-11-21 PROCEDURE — 96366 THER/PROPH/DIAG IV INF ADDON: CPT

## 2020-11-21 PROCEDURE — G0378 HOSPITAL OBSERVATION PER HR: HCPCS

## 2020-11-21 PROCEDURE — 2500000003 HC RX 250 WO HCPCS: Performed by: ORTHOPAEDIC SURGERY

## 2020-11-21 PROCEDURE — 6370000000 HC RX 637 (ALT 250 FOR IP): Performed by: FAMILY MEDICINE

## 2020-11-21 PROCEDURE — 85025 COMPLETE CBC W/AUTO DIFF WBC: CPT

## 2020-11-21 PROCEDURE — 51798 US URINE CAPACITY MEASURE: CPT

## 2020-11-21 PROCEDURE — 99253 IP/OBS CNSLTJ NEW/EST LOW 45: CPT | Performed by: ORTHOPAEDIC SURGERY

## 2020-11-21 PROCEDURE — 87070 CULTURE OTHR SPECIMN AEROBIC: CPT

## 2020-11-21 PROCEDURE — 2580000003 HC RX 258: Performed by: FAMILY MEDICINE

## 2020-11-21 PROCEDURE — 89060 EXAM SYNOVIAL FLUID CRYSTALS: CPT

## 2020-11-21 PROCEDURE — 80048 BASIC METABOLIC PNL TOTAL CA: CPT

## 2020-11-21 PROCEDURE — 87205 SMEAR GRAM STAIN: CPT

## 2020-11-21 PROCEDURE — 6360000002 HC RX W HCPCS: Performed by: FAMILY MEDICINE

## 2020-11-21 PROCEDURE — 20610 DRAIN/INJ JOINT/BURSA W/O US: CPT | Performed by: ORTHOPAEDIC SURGERY

## 2020-11-21 PROCEDURE — 96372 THER/PROPH/DIAG INJ SC/IM: CPT

## 2020-11-21 RX ORDER — LIDOCAINE HYDROCHLORIDE 20 MG/ML
5 INJECTION, SOLUTION EPIDURAL; INFILTRATION; INTRACAUDAL; PERINEURAL ONCE
Status: COMPLETED | OUTPATIENT
Start: 2020-11-21 | End: 2020-11-21

## 2020-11-21 RX ORDER — HYDROCODONE BITARTRATE AND ACETAMINOPHEN 5; 325 MG/1; MG/1
1 TABLET ORAL EVERY 4 HOURS PRN
Status: DISCONTINUED | OUTPATIENT
Start: 2020-11-21 | End: 2020-11-23 | Stop reason: HOSPADM

## 2020-11-21 RX ADMIN — INSULIN LISPRO 2 UNITS: 100 INJECTION, SOLUTION INTRAVENOUS; SUBCUTANEOUS at 18:17

## 2020-11-21 RX ADMIN — NAPHAZOLINE HYDROCHLORIDE AND PHENIRAMINE MALEATE 1 DROP: .25; 3 SOLUTION/ DROPS OPHTHALMIC at 23:14

## 2020-11-21 RX ADMIN — HYDROCHLOROTHIAZIDE 25 MG: 25 TABLET ORAL at 08:30

## 2020-11-21 RX ADMIN — ASPIRIN 81 MG: 81 TABLET, COATED ORAL at 09:56

## 2020-11-21 RX ADMIN — SODIUM CHLORIDE: 9 INJECTION, SOLUTION INTRAVENOUS at 01:38

## 2020-11-21 RX ADMIN — ZOLPIDEM TARTRATE 5 MG: 5 TABLET ORAL at 02:31

## 2020-11-21 RX ADMIN — CEFTRIAXONE SODIUM 1 G: 1 INJECTION, POWDER, FOR SOLUTION INTRAMUSCULAR; INTRAVENOUS at 02:25

## 2020-11-21 RX ADMIN — HYDROCODONE BITARTRATE AND ACETAMINOPHEN 1 TABLET: 5; 325 TABLET ORAL at 18:10

## 2020-11-21 RX ADMIN — INSULIN LISPRO 1 UNITS: 100 INJECTION, SOLUTION INTRAVENOUS; SUBCUTANEOUS at 20:21

## 2020-11-21 RX ADMIN — CEFTRIAXONE SODIUM 1 G: 1 INJECTION, POWDER, FOR SOLUTION INTRAMUSCULAR; INTRAVENOUS at 13:37

## 2020-11-21 RX ADMIN — CARVEDILOL 25 MG: 25 TABLET, FILM COATED ORAL at 18:10

## 2020-11-21 RX ADMIN — HYDROCODONE BITARTRATE AND ACETAMINOPHEN 1 TABLET: 5; 325 TABLET ORAL at 13:37

## 2020-11-21 RX ADMIN — CARVEDILOL 25 MG: 25 TABLET, FILM COATED ORAL at 08:31

## 2020-11-21 RX ADMIN — HYDROCODONE BITARTRATE AND ACETAMINOPHEN 1 TABLET: 5; 325 TABLET ORAL at 03:12

## 2020-11-21 RX ADMIN — METFORMIN HYDROCHLORIDE 500 MG: 500 TABLET ORAL at 08:30

## 2020-11-21 RX ADMIN — HYDROCODONE BITARTRATE AND ACETAMINOPHEN 1 TABLET: 5; 325 TABLET ORAL at 09:20

## 2020-11-21 RX ADMIN — INSULIN LISPRO 2 UNITS: 100 INJECTION, SOLUTION INTRAVENOUS; SUBCUTANEOUS at 08:31

## 2020-11-21 RX ADMIN — LIDOCAINE HYDROCHLORIDE 5 ML: 20 INJECTION, SOLUTION EPIDURAL; INFILTRATION; INTRACAUDAL; PERINEURAL at 09:55

## 2020-11-21 RX ADMIN — TAMSULOSIN HYDROCHLORIDE 0.4 MG: 0.4 CAPSULE ORAL at 08:31

## 2020-11-21 RX ADMIN — INSULIN LISPRO 2 UNITS: 100 INJECTION, SOLUTION INTRAVENOUS; SUBCUTANEOUS at 11:20

## 2020-11-21 RX ADMIN — ENOXAPARIN SODIUM 40 MG: 40 INJECTION SUBCUTANEOUS at 20:14

## 2020-11-21 RX ADMIN — ENOXAPARIN SODIUM 40 MG: 40 INJECTION SUBCUTANEOUS at 08:31

## 2020-11-21 ASSESSMENT — PAIN SCALES - GENERAL
PAINLEVEL_OUTOF10: 8
PAINLEVEL_OUTOF10: 0
PAINLEVEL_OUTOF10: 8

## 2020-11-21 ASSESSMENT — PAIN DESCRIPTION - PAIN TYPE: TYPE: ACUTE PAIN

## 2020-11-21 ASSESSMENT — PAIN DESCRIPTION - DESCRIPTORS: DESCRIPTORS: ACHING

## 2020-11-21 ASSESSMENT — PAIN DESCRIPTION - ORIENTATION: ORIENTATION: RIGHT

## 2020-11-21 ASSESSMENT — ENCOUNTER SYMPTOMS: BACK PAIN: 1

## 2020-11-21 ASSESSMENT — PAIN DESCRIPTION - LOCATION: LOCATION: SHOULDER

## 2020-11-21 NOTE — CARE COORDINATION
CASE MANAGEMENT NOTE:    Admission Date:  11/20/2020 Fabián Flores is a 63 y.o.  male    Admitted for : Urinary tract infection in male [N39.0]    Met with:  Patient    PCP:  Dr. Stephanie Liz:  Hemant Jean-Baptiste:  independently at home             Current Services PTA:  No    Is patient agreeable to VNS: No    Freedom of choice provided:  Yes    List of [address] provided: No    VNS chosen:  No    DME:  none    Home Oxygen: No    Nebulizer: No    CPAP/BIPAP: No    Supplier: N/A    Potential Assistance Needed: No    SNF needed: No    Freedom of choice and list provided: NA    Pharmacy:  [facility]       Does Patient want to use MEDS to BEDS? No    Is patient currently receiving oral anticoagulation therapy? No    Is the Patient an [facility] with Readmission Risk Score greater than 14%? No  If yes, pt needs a follow up appointment made within 7 days. Family Members/Caregivers that pt would like involved in their care:    Yes    If yes, list name here: Wife Dennise Duran    Transportation Provider:  Patient             Is patient in Isolation/One on One/Altered Mental Status? No  If yes, skip next question. If no, would they like an I-Pad to  use? NA  If yes, call [phone]. Discharge Plan:  11/21: [address] - Patient is from 1-story home with wife and granddaughter. He is independent and drives. DME - None. Declines need for VNS. Possible Left septic knee - Bedside aspiration today by Dr. Denilson Chiang. On IV rocephin. Will follow along.  //LOLY                 Electronically signed by: Nora Cooney RN on 11/21/2020 at 12:01 PM

## 2020-11-21 NOTE — PROCEDURES
After verbal consent was obtained the patient's left knee was prepped with lidocaine prepped in the superior lateral aspect of the patella. The area was instilled with 3 cc of 2% lidocaine. After appropriate time and then an 18-gauge needle connected to a 60 cc syringe was injected and placed into the joint. Approximately 13 cc of bloody material was obtained. No Celestone was injection. The contents were sent for appropriate studies including Gram stain cell count culture sensitivity and crystals.

## 2020-11-21 NOTE — CONSULTS
ORTHOPAEDIC CONSULTATION    Reason for consult  Painful swollen left knee. Patient is a 70-year-old gentleman who has history of bladder cancer who was having increasing frequency and urgency when he presented to the emergency room. Patient also is having complaints of some multiple joint areas but most specifically his left knee. Patient was seen by Dr. Lesly Stack and orthopedics have been consulted to rule out possible septic left knee. HPI / Chief Complaint  Jamari Yang is a 63 y.o. old male who presents for as above    Past Medical History  Fabián  has a past medical history of Arthritis, Cancer ([address] Depression, Diabetes mellitus ([address] [place] (hard of hearing), Hypertension, Obesity, SOB (shortness of breath) on exertion, Umbilical hernia, and Wears glasses. Past Surgical History  Fabián  has a past surgical history that includes Cystocopy (5/21/14); Cystocopy (8/21/14); Cystoscopy (11-21-14); Colonoscopy (5088); repair umbilical QRVQ,8+L/M,DXJTDE (N/A, 6/13/2018); Cystoscopy (N/A, 8/27/2019); Cystoscopy (N/A, 8/21/2020); and Cystoscopy (N/A, 9/8/2020). Current Medications  Reviewed. See EMR for details. Allergies  Allergies have been reviewed. Fabián has No Known Allergies. Social History  Fabián  reports that he quit smoking about 19 years ago. His smoking use included cigarettes. He started smoking about 43 years ago. He has a 20.00 pack-year smoking history. He quit smokeless tobacco use about 16 years ago. He reports current alcohol use. He reports that he does not use drugs. Family History  Fabián's family history includes Cancer in his brother; Coronary Art Dis in his father; Depression in his mother; Diabetes in his father and mother; Hypertension in his mother; Lavetta Rancher in his father. Review of Systems   History obtained from the patient.    Constitution: no fever or chills  Musculoskeletal: As noted in the HPI   Neurologic: no neurologic symptoms    Physical Exam  BP (!) 142/89   Pulse 99 Temp 97.2 °F (36.2 °C) (Oral)   Resp 18   Ht 6' 5\" (1.956 m)   Wt (!) 381 lb 6.3 oz (173 kg)   SpO2 95%   BMI 45.23 kg/m²   General Appearance: alert, well appearing, and in no distress  Mental Status: alert, oriented to person, place, and time  Examination of the patient's left knee notes that he has a mild effusion with some warmth. He has a painful range of motion. No obvious ligamentous damage that can be appreciated. No signs of cellulitis    Imaging Studies  None    Diagnostics and Labs  Elevated CRP    Impression and Plan  Fabián Stover is a 63 y.o. old male presents with a possible septic knee versus possible acute gouty attack.   Patient's left knee will be aspirated and the contents will be sent to pathology for appropriate studies including Gram stain culture sensitivity cell count and crystals

## 2020-11-21 NOTE — H&P
GENERAL HISTORY AND PHYSICAL  2020    PROBLEM:  does not have any pertinent problems on file. HISTORY OF PRESENT ILLNESS:  Fina Estrada is an 63 y.o. male. Patient states he is having increased urinary frequency and some burning. Recently had treatment for bladder cancer. Left knee has been painful and swollen since this past . He denies any recent injury. He is also complaining of right shoulder and lower back pain. The most severe pain is in the right knee. He denies any fever or chills. PAST MEDICAL HISTORY:   has a past medical history of Arthritis, Cancer ([facility] [address] Depression, Diabetes mellitus ([facility] [address] [place] (hard of hearing), Hypertension, Obesity, SOB (shortness of breath) on exertion, Umbilical hernia, and Wears glasses. PAST SURGICAL HISTORY:   has a past surgical history that includes Cystocopy (14); Cystocopy (14); Cystoscopy (14); Colonoscopy (07); repair umbilical JPMD,6+Q/Z,GXYPFN (N/A, 2018); Cystoscopy (N/A, 2019); Cystoscopy (N/A, 2020); and Cystoscopy (N/A, 2020). SOCIAL HISTORY:  Social History     Tobacco Use    Smoking status: Former Smoker     Packs/day: 1.00     Years: 20.00     Pack years: 20.00     Types: Cigarettes     Start date: 1977     Last attempt to quit: 2001     Years since quittin.8    Smokeless tobacco: Former User     Quit date: 2004   Substance Use Topics    Alcohol use: Yes     Types: 1 Cans of beer per week     Comment: rare       ALLERGIES:  has No Known Allergies. HOME MEDICATIONS:  Prior to Admission medications    Medication Sig Start Date End Date Taking?  Authorizing Provider   tamsulosin (FLOMAX) 0.4 MG capsule Take 1 capsule by mouth daily 10/23/20  Yes Arile Jon MD   hydroCHLOROthiazide (HYDRODIURIL) 25 MG tablet Take 1 tablet by mouth every morning 20  Yes Veronica Gonzalez MD   metFORMIN (GLUCOPHAGE) 500 MG tablet Take 1 tablet by mouth daily (with breakfast) 6/3/20 Yes Freddy Sutton MD   carvedilol (COREG) 25 MG tablet Take 1 tablet by mouth 2 times daily (with meals) 6/3/20  Yes Freddy Sutton MD   albuterol sulfate HFA (PROAIR HFA) 108 (90 Base) MCG/ACT inhaler Inhale 2 puffs into the lungs every 6 hours as needed for Wheezing or Shortness of Breath 5/7/19  Yes Freddy Sutton MD   aspirin 81 MG tablet Take 81 mg by mouth daily. Yes Historical Provider, MD   Multiple Vitamins-Minerals (MULTIVITAMIN & MINERAL PO) Take 1 tablet by mouth daily. Yes Historical Provider, MD   Lancets MISC 1 each by Does not apply route 2 times daily 12/26/18   Freddy Sutton MD    Scheduled Meds:   cefTRIAXone (ROCEPHIN) IV  1 g Intravenous Q12H    enoxaparin  40 mg Subcutaneous BID    hydroCHLOROthiazide  25 mg Oral QAM    carvedilol  25 mg Oral BID WC    aspirin  81 mg Oral Daily    metFORMIN  500 mg Oral Daily with breakfast    tamsulosin  0.4 mg Oral Daily    insulin lispro  0-12 Units Subcutaneous TID WC    insulin lispro  0-6 Units Subcutaneous Nightly     Continuous Infusions:   sodium chloride 100 mL/hr at 11/21/20 0255    dextrose       PRN Meds:. HYDROcodone 5 mg - acetaminophen, ondansetron, zolpidem, acetaminophen, albuterol, glucose, dextrose, glucagon (rDNA), dextrose    IMMUNIZATIONS:  Immunization History   Administered Date(s) Administered    DTaP 05/16/1995    Influenza Vaccine, unspecified formulation 12/31/2008, 12/01/2010    Influenza, Quadv, IM, (6 mo and older Fluzone, Flulaval, Fluarix and 3 yrs and older Afluria) 10/30/2018    Influenza, Quadv, IM, PF (6 mo and older Fluzone, Flulaval, Fluarix, and 3 yrs and older Afluria) 09/10/2020    Pneumococcal Conjugate 13-valent ([id number]) 09/21/2016    Pneumococcal Polysaccharide (Prfftftet27) 12/31/2008    Tdap (Boostrix, Adacel) 12/31/2008, 05/07/2019       REVIEW OF SYSTEMS:  Review of Systems   Constitutional: Negative.     Genitourinary: Positive for difficulty urinating, dysuria and frequency. Musculoskeletal: Positive for arthralgias and back pain. No LMP for male patient. height is 6' 5\" (1.956 m) and weight is 381 lb 6.3 oz (173 kg) (abnormal). His oral temperature is 97.2 °F (36.2 °C). His blood pressure is 142/89 (abnormal) and his pulse is 99. His respiration is 18 and oxygen saturation is 95%. CBC:   Lab Results   Component Value Date    WBC 16.4 11/21/2020    HGB 12.9 11/21/2020     11/21/2020     BMP:    Lab Results   Component Value Date     11/21/2020    K 3.7 11/21/2020     11/21/2020    CO2 25 11/21/2020    BUN 17 11/21/2020    CREATININE 0.59 11/21/2020    CALCIUM 8.4 11/21/2020    GLUCOSE 183 11/21/2020    GLUCOSE 129 06/11/2020     PT/INR:  No results found for: PROTIME, INR  Troponin:  No results found for: TROPONINI    PHYSICAL EXAM:  Physical Exam  Vitals signs and nursing note reviewed. Constitutional:       General: He is not in acute distress. Appearance: He is well-developed. He is obese. He is not diaphoretic. HENT:      Head: Normocephalic and atraumatic. Right Ear: External ear normal.      Left Ear: External ear normal.      Mouth/Throat:      Pharynx: No oropharyngeal exudate. Eyes:      General:         Right eye: No discharge. Left eye: No discharge. Conjunctiva/sclera: Conjunctivae normal.   Neck:      Thyroid: No thyromegaly. Trachea: No tracheal deviation. Cardiovascular:      Rate and Rhythm: Normal rate and regular rhythm. Heart sounds: Normal heart sounds. No murmur. Comments: No carotid bruits      Pulmonary:      Effort: Pulmonary effort is normal. No respiratory distress. Breath sounds: Normal breath sounds. No wheezing. Abdominal:      General: Bowel sounds are normal. There is no distension. Palpations: Abdomen is soft. There is no mass. Tenderness: There is abdominal tenderness. There is no guarding or rebound. Hernia: No hernia is present.       Comments: Mild tenderness in the suprapubic area   Musculoskeletal:      Right lower leg: No edema. Left lower leg: No edema. Comments: Left knee has increased warmth, increased swelling. No redness noted pain with minimal flexion and with trying full extension. Right knee range of motion is decreased but not painful. Lymphadenopathy:      Cervical: No cervical adenopathy. Skin:     General: Skin is warm and dry. Findings: No erythema. Neurological:      Mental Status: He is alert and oriented to person, place, and time. Cranial Nerves: No cranial nerve deficit. Psychiatric:         Mood and Affect: Mood normal.         Behavior: Behavior normal.         Thought Content: Thought content normal.            ASSESSMENT:  Urinary tract infection  Septic left knee joint  High CRP level  Recent treatment for bladder cancer       PLAN:  1. Decrease IV fluids. Continue IV antibiotics. Will need post void bladder scan to make sure he is emptying sufficiently. Orthopedic consult for possible septic left knee. Repeat CBC and CRP level in the morning  Physical therapy evaluation since patient is having difficulty walking. He may need a walker.

## 2020-11-21 NOTE — PROGRESS NOTES
Initial review of aspiration results shows no crystal. Culture pending; however, am not see that the order for cell count was done. Spoke with Court to discuss and she will call the lab to verify order for cell count done.

## 2020-11-21 NOTE — PLAN OF CARE
Problem: Falls - Risk of:  Goal: Will remain free from falls  Description: Will remain free from falls  11/21/2020 0523 by Kimberlee Allen RN  Outcome: Ongoing     Problem: Falls - Risk of:  Goal: Absence of physical injury  Description: Absence of physical injury  Outcome: Ongoing     Problem: Pain:  Goal: Pain level will decrease  Description: Pain level will decrease  Outcome: Ongoing  Goal: Control of acute pain  Description: Control of acute pain  Outcome: Ongoing  Goal: Control of chronic pain  Description: Control of chronic pain  Outcome: Ongoing

## 2020-11-22 PROBLEM — N39.0 UTI (URINARY TRACT INFECTION): Status: ACTIVE | Noted: 2020-11-22

## 2020-11-22 PROBLEM — N39.0 URINARY TRACT INFECTION IN MALE: Status: RESOLVED | Noted: 2020-11-20 | Resolved: 2020-11-22

## 2020-11-22 LAB
ABSOLUTE EOS #: 0 K/UL (ref 0–0.4)
ABSOLUTE IMMATURE GRANULOCYTE: ABNORMAL K/UL (ref 0–0.3)
ABSOLUTE LYMPH #: 1.8 K/UL (ref 1–4.8)
ABSOLUTE MONO #: 1.3 K/UL (ref 0.1–1.3)
BASOPHILS # BLD: 1 % (ref 0–2)
BASOPHILS ABSOLUTE: 0.1 K/UL (ref 0–0.2)
C-REACTIVE PROTEIN: 284.9 MG/L (ref 0–5)
DIFFERENTIAL TYPE: ABNORMAL
EOSINOPHILS RELATIVE PERCENT: 0 % (ref 0–4)
GLUCOSE BLD-MCNC: 142 MG/DL (ref 75–110)
GLUCOSE BLD-MCNC: 171 MG/DL (ref 75–110)
GLUCOSE BLD-MCNC: 197 MG/DL (ref 75–110)
HCT VFR BLD CALC: 35.7 % (ref 41–53)
HEMOGLOBIN: 12.6 G/DL (ref 13.5–17.5)
IMMATURE GRANULOCYTES: ABNORMAL %
LYMPHOCYTES # BLD: 14 % (ref 24–44)
MCH RBC QN AUTO: 31.3 PG (ref 26–34)
MCHC RBC AUTO-ENTMCNC: 35.2 G/DL (ref 31–37)
MCV RBC AUTO: 88.9 FL (ref 80–100)
MONOCYTES # BLD: 11 % (ref 1–7)
NRBC AUTOMATED: ABNORMAL PER 100 WBC
PDW BLD-RTO: 12.7 % (ref 11.5–14.9)
PLATELET # BLD: 262 K/UL (ref 150–450)
PLATELET ESTIMATE: ABNORMAL
PMV BLD AUTO: 8.1 FL (ref 6–12)
RBC # BLD: 4.02 M/UL (ref 4.5–5.9)
RBC # BLD: ABNORMAL 10*6/UL
SEG NEUTROPHILS: 74 % (ref 36–66)
SEGMENTED NEUTROPHILS ABSOLUTE COUNT: 9.5 K/UL (ref 1.3–9.1)
URIC ACID: 5.3 MG/DL (ref 3.4–7)
WBC # BLD: 12.8 K/UL (ref 3.5–11)
WBC # BLD: ABNORMAL 10*3/UL

## 2020-11-22 PROCEDURE — 86140 C-REACTIVE PROTEIN: CPT

## 2020-11-22 PROCEDURE — 97162 PT EVAL MOD COMPLEX 30 MIN: CPT

## 2020-11-22 PROCEDURE — 6370000000 HC RX 637 (ALT 250 FOR IP): Performed by: FAMILY MEDICINE

## 2020-11-22 PROCEDURE — 6360000002 HC RX W HCPCS: Performed by: FAMILY MEDICINE

## 2020-11-22 PROCEDURE — 84550 ASSAY OF BLOOD/URIC ACID: CPT

## 2020-11-22 PROCEDURE — 86038 ANTINUCLEAR ANTIBODIES: CPT

## 2020-11-22 PROCEDURE — 1200000000 HC SEMI PRIVATE

## 2020-11-22 PROCEDURE — 85025 COMPLETE CBC W/AUTO DIFF WBC: CPT

## 2020-11-22 PROCEDURE — 36415 COLL VENOUS BLD VENIPUNCTURE: CPT

## 2020-11-22 PROCEDURE — G0378 HOSPITAL OBSERVATION PER HR: HCPCS

## 2020-11-22 PROCEDURE — 82947 ASSAY GLUCOSE BLOOD QUANT: CPT

## 2020-11-22 PROCEDURE — 99231 SBSQ HOSP IP/OBS SF/LOW 25: CPT | Performed by: ORTHOPAEDIC SURGERY

## 2020-11-22 PROCEDURE — 2580000003 HC RX 258: Performed by: FAMILY MEDICINE

## 2020-11-22 PROCEDURE — 96366 THER/PROPH/DIAG IV INF ADDON: CPT

## 2020-11-22 PROCEDURE — 96372 THER/PROPH/DIAG INJ SC/IM: CPT

## 2020-11-22 PROCEDURE — 97110 THERAPEUTIC EXERCISES: CPT

## 2020-11-22 RX ORDER — PREDNISONE 20 MG/1
20 TABLET ORAL 2 TIMES DAILY
Status: DISCONTINUED | OUTPATIENT
Start: 2020-11-22 | End: 2020-11-23 | Stop reason: HOSPADM

## 2020-11-22 RX ORDER — HYDROCODONE BITARTRATE AND ACETAMINOPHEN 5; 325 MG/1; MG/1
1 TABLET ORAL EVERY 4 HOURS PRN
Qty: 30 TABLET | Refills: 0 | Status: SHIPPED | OUTPATIENT
Start: 2020-11-22 | End: 2020-11-27

## 2020-11-22 RX ORDER — PREDNISONE 20 MG/1
20 TABLET ORAL 2 TIMES DAILY
Qty: 14 TABLET | Refills: 0 | Status: SHIPPED | OUTPATIENT
Start: 2020-11-22 | End: 2020-11-29

## 2020-11-22 RX ADMIN — TAMSULOSIN HYDROCHLORIDE 0.4 MG: 0.4 CAPSULE ORAL at 07:39

## 2020-11-22 RX ADMIN — INSULIN LISPRO 2 UNITS: 100 INJECTION, SOLUTION INTRAVENOUS; SUBCUTANEOUS at 07:39

## 2020-11-22 RX ADMIN — INSULIN LISPRO 4 UNITS: 100 INJECTION, SOLUTION INTRAVENOUS; SUBCUTANEOUS at 17:33

## 2020-11-22 RX ADMIN — INSULIN LISPRO 2 UNITS: 100 INJECTION, SOLUTION INTRAVENOUS; SUBCUTANEOUS at 11:47

## 2020-11-22 RX ADMIN — HYDROCODONE BITARTRATE AND ACETAMINOPHEN 1 TABLET: 5; 325 TABLET ORAL at 22:48

## 2020-11-22 RX ADMIN — CARVEDILOL 25 MG: 25 TABLET, FILM COATED ORAL at 17:33

## 2020-11-22 RX ADMIN — ENOXAPARIN SODIUM 40 MG: 40 INJECTION SUBCUTANEOUS at 07:39

## 2020-11-22 RX ADMIN — HYDROCHLOROTHIAZIDE 25 MG: 25 TABLET ORAL at 07:39

## 2020-11-22 RX ADMIN — PREDNISONE 20 MG: 20 TABLET ORAL at 19:58

## 2020-11-22 RX ADMIN — CEFTRIAXONE SODIUM 1 G: 1 INJECTION, POWDER, FOR SOLUTION INTRAMUSCULAR; INTRAVENOUS at 13:14

## 2020-11-22 RX ADMIN — METFORMIN HYDROCHLORIDE 500 MG: 500 TABLET ORAL at 07:39

## 2020-11-22 RX ADMIN — HYDROCODONE BITARTRATE AND ACETAMINOPHEN 1 TABLET: 5; 325 TABLET ORAL at 14:44

## 2020-11-22 RX ADMIN — PREDNISONE 20 MG: 20 TABLET ORAL at 11:47

## 2020-11-22 RX ADMIN — ENOXAPARIN SODIUM 40 MG: 40 INJECTION SUBCUTANEOUS at 19:58

## 2020-11-22 RX ADMIN — HYDROCODONE BITARTRATE AND ACETAMINOPHEN 1 TABLET: 5; 325 TABLET ORAL at 05:07

## 2020-11-22 RX ADMIN — HYDROCODONE BITARTRATE AND ACETAMINOPHEN 1 TABLET: 5; 325 TABLET ORAL at 18:31

## 2020-11-22 RX ADMIN — CARVEDILOL 25 MG: 25 TABLET, FILM COATED ORAL at 07:39

## 2020-11-22 RX ADMIN — INSULIN LISPRO 1 UNITS: 100 INJECTION, SOLUTION INTRAVENOUS; SUBCUTANEOUS at 19:57

## 2020-11-22 RX ADMIN — ASPIRIN 81 MG: 81 TABLET, COATED ORAL at 07:39

## 2020-11-22 RX ADMIN — CEFTRIAXONE SODIUM 1 G: 1 INJECTION, POWDER, FOR SOLUTION INTRAMUSCULAR; INTRAVENOUS at 02:14

## 2020-11-22 RX ADMIN — HYDROCODONE BITARTRATE AND ACETAMINOPHEN 1 TABLET: 5; 325 TABLET ORAL at 10:40

## 2020-11-22 ASSESSMENT — PAIN DESCRIPTION - PROGRESSION: CLINICAL_PROGRESSION: NOT CHANGED

## 2020-11-22 ASSESSMENT — PAIN SCALES - GENERAL
PAINLEVEL_OUTOF10: 6
PAINLEVEL_OUTOF10: 9
PAINLEVEL_OUTOF10: 4
PAINLEVEL_OUTOF10: 8
PAINLEVEL_OUTOF10: 4
PAINLEVEL_OUTOF10: 5
PAINLEVEL_OUTOF10: 2
PAINLEVEL_OUTOF10: 8
PAINLEVEL_OUTOF10: 8

## 2020-11-22 ASSESSMENT — PAIN DESCRIPTION - LOCATION
LOCATION: KNEE
LOCATION: KNEE

## 2020-11-22 ASSESSMENT — PAIN DESCRIPTION - PAIN TYPE
TYPE: ACUTE PAIN
TYPE: ACUTE PAIN

## 2020-11-22 ASSESSMENT — PAIN DESCRIPTION - FREQUENCY
FREQUENCY: INTERMITTENT
FREQUENCY: INTERMITTENT

## 2020-11-22 ASSESSMENT — PAIN DESCRIPTION - ONSET: ONSET: ON-GOING

## 2020-11-22 ASSESSMENT — PAIN DESCRIPTION - DESCRIPTORS: DESCRIPTORS: SHARP

## 2020-11-22 ASSESSMENT — PAIN DESCRIPTION - ORIENTATION
ORIENTATION: RIGHT;LEFT
ORIENTATION: RIGHT;LEFT

## 2020-11-22 NOTE — PROGRESS NOTES
Goals;PT Role;Plan of Care;Home Exercise Program;Gait Training;Transfer Training  Patient Education: educated on ACSM recommendations on activity and exercise, educated on the purpose of the walker to offload the painful joints. Reviewed stair negotiation and the correct pattern \"up with the good, down with the bad\"  REQUIRES PT FOLLOW UP: Yes  Activity Tolerance  Activity Tolerance: Patient limited by pain; Patient limited by endurance       Patient Diagnosis(es): The primary encounter diagnosis was Urinary tract infection in male. A diagnosis of Pyogenic arthritis of left knee joint, due to unspecified organism [facility]) was also pertinent to this visit. has a past medical history of Arthritis, Cancer ([facility] [address] Depression, Diabetes mellitus ([facility] [address] [place] (hard of hearing), Hypertension, Obesity, SOB (shortness of breath) on exertion, Umbilical hernia, and Wears glasses. has a past surgical history that includes Cystocopy (5/21/14); Cystocopy (8/21/14); Cystoscopy (11-21-14); Colonoscopy (2565); repair umbilical PWHW,4+K/H,TJAUSD (N/A, 6/13/2018); Cystoscopy (N/A, 8/27/2019); Cystoscopy (N/A, 8/21/2020); and Cystoscopy (N/A, 9/8/2020). Restrictions  Restrictions/Precautions  Restrictions/Precautions: Fall Risk(Peripheral Iv L antecubital, activity as tolerated)  Required Braces or Orthoses?: No  Vision/Hearing  Vision: Impaired  Vision Exceptions: Wears glasses at all times  Hearing: Exceptions to [facility]  Hearing Exceptions: Hard of hearing/hearing concerns;Bilateral hearing aid     Subjective  General  Chart Reviewed: Yes  Patient assessed for rehabilitation services?: Yes  Additional Pertinent Hx: Mitch Caicedo is an 63 y.o. male. Patient states he is having increased urinary frequency and some burning. Recently had treatment for bladder cancer, final cancer treatment was on 11/13. Left knee has been painful and swollen since this past Sunday. He denies any recent injury.   He is also complaining of right shoulder and lower back pain. The most severe pain is in the left knee. pt denies any fever or chills. Dr Adrian Lindo consulted and performed R knee joint aspiration for joint fluid culture on 11/21. past medical history of Arthritis, Cancer ([facility] [address] Depression, Diabetes mellitus ([facility] [address] [place] (hard of hearing), Hypertension, Obesity, SOB (shortness of breath) on exertion, Umbilical hernia, and Wears glasses. Response To Previous Treatment: Not applicable  Family / Caregiver Present: No  Referring Practitioner: Kaley Hoff MD  Referral Date : 11/21/20  Diagnosis: UTI and pyogenic arthritis of left knee joint  Follows Commands: Within Functional Limits  General Comment  Comments: pt states that increased activity and weightbearing increases the pain in his knees but \"I know that I have to to keep moving\". Regarding Physical therapy he states, \"I know that I have to do it\"  Subjective  Subjective: pt states that increased activity and weightbearing increases the pain in his knees but \"I know that I have to to keep moving\". Regarding Physical therapy he states, \"I know that I have to do it\".  regarding activity at home pt states that he has a pedal exerciser  Pain Screening  Patient Currently in Pain: Yes  Pain Assessment  Pain Assessment: 0-10  Pain Level: 4  Pain Type: Acute pain  Pain Location: Knee  Pain Orientation: Right;Left  Pain Descriptors: Sharp  Pain Frequency: Intermittent  Pain Onset: On-going  Clinical Progression: Not changed  Vital Signs  Patient Currently in Pain: Yes       Orientation  Orientation  Overall Orientation Status: Within Functional Limits  Social/Functional History  Social/Functional History  Lives With: Spouse, Family(Granddaughter)  Type of Home: House  Home Layout: One level  Home Access: Stairs to enter with rails  Entrance Stairs - Number of Steps: 4  Entrance Stairs - Rails: Right  Bathroom Shower/Tub: Tub/Shower unit, Curtain  Bathroom Toilet: Handicap height  Receives Help From: Family, Neighbor  ADL Assistance: Independent  Homemaking Assistance: Needs assistance(Ariana the wife and the Camron Nance was cleaning, cooking, laundry. Pt does yard work)  Homemaking Responsibilities: No  Ambulation Assistance: Independent  Transfer Assistance: Independent  Active : Yes  Mode of Transportation: Truck  Occupation: Full time employment  Type of occupation: Maintanence  Additional Comments: Planning to retire in February  Cognition   Cognition  Overall Cognitive Status: WNL    Objective     Observation/Palpation  Posture: Fair  Observation: pt is resting supine in bed with HOB elevated > half-way. Peripheral IV left antecubital.  Edema: visible swelling around Left knee    PROM RLE (degrees)  RLE PROM: WFL  AROM RLE (degrees)  RLE AROM: WFL  RLE General AROM: pt experiences increased pain with TKE  PROM LLE (degrees)  LLE PROM: WFL  AROM LLE (degrees)  LLE AROM : WFL  LLE General AROM: pt experiences increased pain with TKE  PROM RUE (degrees)  RUE General PROM: See OT assessment of UEs  AROM RUE (degrees)  RUE General AROM: See OT assessment of UEs  PROM LUE (degrees)  LUE General PROM: See OT assessment of UEs  AROM LUE (degrees)  LUE General AROM: See OT assessment of UEs  Strength RLE  Strength RLE: WNL  Comment: Grossly 5/5  Strength LLE  Strength LLE: WNL  Comment: Grossly 5/5  Strength RUE  Comment: See OT assessments of UEs  Strength LUE  Comment: See OT assessments of UEs     Sensation  Overall Sensation Status: (denies)  Bed mobility  Rolling to Left: Modified independent  Supine to Sit: Modified independent  Scooting: Independent  Comment: HOB slightly elevated and pt uses railing to assist. pt appears SOB initially after sitting up. c/o of lightheadedness which subsided quickly  Transfers  Sit to Stand: Contact guard assistance  Stand to sit: Contact guard assistance  Comment: wheeled walker used for transfers; pt performs transfers from the bed and from the reclining chair.  Requires use of BUEs to assist himself up into standing  Ambulation  Ambulation?: Yes  More Ambulation?: No  Ambulation 1  Surface: level tile  Device: Rolling Walker  Assistance: Contact guard assistance  Quality of Gait: antalgic and slow; increased weightbearing through UEs on walker to offload knee joints. Gait Deviations: Decreased step height;Decreased step length; Slow Jennie  Distance: 15'  Comments: pt expressed his awareness of how walking with a walker feels compared to walking without a walker. Pt likes that he can reduce some weightbearing through the LEs, helps to alleviate some pain. Stairs/Curb  Stairs?: No     Balance  Posture: Fair  Sitting - Static: Good  Sitting - Dynamic: Good  Standing - Static: Good(with AD)  Standing - Dynamic: Good(with AD)  Other exercises  Other exercises?: Yes  Other exercises 1: Standing marches x10 R/L, standing bilat heel raises x10,  Reviewed, demonstrated, and advised pt to perform seated ankle pumps and LAQ to maintain joint mobility while he sits up in the chair.      Plan   Plan  Times per week: 2-3 treatments per 3 days  Specific instructions for Next Treatment: progress gait, and trial steps, initiate HEP strength and ROM of LE (pt has a floor pedal exerciser at home that he intends on using),  Current Treatment Recommendations: Endurance Training, Home Exercise Program, Transfer Training, Stair training, Gait Training, Pain Management, Patient/Caregiver Education & Training, ROM, Strengthening, Equipment Evaluation, Education, & procurement  Safety Devices  Type of devices: Patient at risk for falls, Gait belt, Call light within reach, Left in chair, Nurse notified  Restraints  Initially in place: No    G-Code       OutComes Score                                                  AM-PAC Score  AM-PAC Inpatient Mobility Raw Score : 18 (11/22/20 0940)  AM-PAC Inpatient T-Scale Score : 43.63 (11/22/20 0940)  Mobility Inpatient CMS 0-100% Score: 46.58 (11/22/20 0940)  Mobility Inpatient CMS G-Code Modifier : CK (11/22/20 0940)          Goals  Short term goals  Time Frame for Short term goals: 2-3 treatments per 3 days  Short term goal 1: pt to ambulate 76' with wheeled walker Mod I to improve safety and independence with ambulating household distances  Short term goal 2: pt to demonstrate independepnce with LE strength/ROM HEP to demonstrate independence with self-care and self-management of his condition  Short term goal 3: pt to ascend/descend 4 steps ( or 6\" step-ups) Independently with use of R hand rail to increase safety and independence with stair negotiation  Short term goal 4: pt to demonstrates transfers MOD I with wheeled walker  Short term goal 5: pt to independently demonstrate bed mobility with the bed flattened and no hand rail.   Patient Goals   Patient goals : to return home and to work       Therapy Time   Individual Concurrent Group Co-treatment   Time In 0940         Time Out 1020         Minutes 40         Timed Code Treatment Minutes: 20 Minutes       Yuly Degroot    PT evaluation/treatment is completed by Giovanni Holstein, SPT under the direct supervision of co-signing therapist, who agrees with all documentation and evaluation/treatment

## 2020-11-22 NOTE — CARE COORDINATION
DISCHARGE PLANNING NOTE:    Plan is for this patient to return to home - Possibly tomorrow. We are awaiting final cultures from knee aspirate done on 11/21    On IV vanco - Will switch to PO upon discharge. He declines any discharge needs - Will continue to follow along.      Electronically signed by Geraldo March RN on 11/22/2020 at 11:30 AM

## 2020-11-22 NOTE — PROGRESS NOTES
Pt called writer into room regarding eye irritation and dry eye. Writer called Dr. Kelsey Bello, she gave telephone with read back order for visine PRN.

## 2020-11-22 NOTE — FLOWSHEET NOTE
Writer spoke with lab in regards to cell count for left knee fluid sample. Lab was not able to complete due to coagulation of fluid sample.

## 2020-11-22 NOTE — PROGRESS NOTES
Patient states knee pain is much better than before the aspiration. Exam  Of the left knee notes that the warmth and swelling is basically absent and minimal pain with motion. Thus far, aspirate negative for neutrophils, bacteria , or crystals. Await 24-48 hour results. Aspirate did appear to be mostly blood. Sample clotted and prevented appropriate cell count. CRP is highly elevated but ESR WNL. ? /    I would advise one more day of antibiotics and check later culture results. If negative, would think ok to discharge home tomorrow and no need for IV antibiotics for his left knee.     Agree with prednisone and PT

## 2020-11-22 NOTE — FLOWSHEET NOTE
Pre voiding bladder scan results were inaccurate as the patient had urgency/incontinence prior to being scanned. Post voiding residuals were measured as 99 and 96 .

## 2020-11-22 NOTE — PLAN OF CARE
Problem: Falls - Risk of:  Goal: Will remain free from falls  Description: Will remain free from falls  11/22/2020 0616 by Darci Martinez RN  Outcome: Ongoing     Problem: Falls - Risk of:  Goal: Absence of physical injury  Description: Absence of physical injury  11/22/2020 0616 by Darci Martinez RN  Outcome: Ongoing     Problem: Pain:  Goal: Pain level will decrease  Description: Pain level will decrease  11/22/2020 0616 by Darci Martinez RN  Outcome: Ongoing     Problem: Pain:  Goal: Control of acute pain  Description: Control of acute pain  11/22/2020 0616 by Darci Martinez RN  Outcome: Ongoing     Problem: Pain:  Goal: Control of chronic pain  Description: Control of chronic pain  11/22/2020 0616 by Darci Martinez RN  Outcome: Ongoing     Problem: Skin Integrity:  Goal: Will show no infection signs and symptoms  Description: Will show no infection signs and symptoms  11/22/2020 0616 by Darci Martinez RN  Outcome: Ongoing     Problem: Skin Integrity:  Goal: Absence of new skin breakdown  Description: Absence of new skin breakdown  11/22/2020 0616 by Darci Martinez RN  Outcome: Ongoing

## 2020-11-22 NOTE — PROGRESS NOTES
List:     Bladder cancer [facility])     Essential hypertension     Sensorineural hearing loss of both ears     Morbid obesity ([address]     Diabetes mellitus type 2 in obese ([address]     Localized edema     Diabetic polyneuropathy associated with type 2 diabetes mellitus ([address]     Urinary tract infection in male     Septic joint of left knee joint [facility])      Electronically signed by Britta Hernandez MD on 11/22/2020 at 9:39 AM

## 2020-11-23 VITALS
BODY MASS INDEX: 37.19 KG/M2 | TEMPERATURE: 97.2 F | RESPIRATION RATE: 16 BRPM | WEIGHT: 315 LBS | HEART RATE: 74 BPM | DIASTOLIC BLOOD PRESSURE: 75 MMHG | OXYGEN SATURATION: 95 % | HEIGHT: 77 IN | SYSTOLIC BLOOD PRESSURE: 162 MMHG

## 2020-11-23 LAB
ANTI-NUCLEAR ANTIBODY (ANA): NEGATIVE
GLUCOSE BLD-MCNC: 150 MG/DL (ref 75–110)
GLUCOSE BLD-MCNC: 153 MG/DL (ref 75–110)
GLUCOSE BLD-MCNC: 168 MG/DL (ref 75–110)

## 2020-11-23 PROCEDURE — 6360000002 HC RX W HCPCS: Performed by: FAMILY MEDICINE

## 2020-11-23 PROCEDURE — G0378 HOSPITAL OBSERVATION PER HR: HCPCS

## 2020-11-23 PROCEDURE — 2580000003 HC RX 258: Performed by: FAMILY MEDICINE

## 2020-11-23 PROCEDURE — 6370000000 HC RX 637 (ALT 250 FOR IP): Performed by: FAMILY MEDICINE

## 2020-11-23 PROCEDURE — 99239 HOSP IP/OBS DSCHRG MGMT >30: CPT | Performed by: FAMILY MEDICINE

## 2020-11-23 PROCEDURE — 82947 ASSAY GLUCOSE BLOOD QUANT: CPT

## 2020-11-23 PROCEDURE — 97116 GAIT TRAINING THERAPY: CPT

## 2020-11-23 PROCEDURE — 96366 THER/PROPH/DIAG IV INF ADDON: CPT

## 2020-11-23 PROCEDURE — 97110 THERAPEUTIC EXERCISES: CPT

## 2020-11-23 PROCEDURE — 96372 THER/PROPH/DIAG INJ SC/IM: CPT

## 2020-11-23 RX ADMIN — INSULIN LISPRO 2 UNITS: 100 INJECTION, SOLUTION INTRAVENOUS; SUBCUTANEOUS at 07:45

## 2020-11-23 RX ADMIN — TAMSULOSIN HYDROCHLORIDE 0.4 MG: 0.4 CAPSULE ORAL at 07:35

## 2020-11-23 RX ADMIN — INSULIN LISPRO 2 UNITS: 100 INJECTION, SOLUTION INTRAVENOUS; SUBCUTANEOUS at 16:19

## 2020-11-23 RX ADMIN — ASPIRIN 81 MG: 81 TABLET, COATED ORAL at 07:35

## 2020-11-23 RX ADMIN — CEFTRIAXONE SODIUM 1 G: 1 INJECTION, POWDER, FOR SOLUTION INTRAMUSCULAR; INTRAVENOUS at 02:26

## 2020-11-23 RX ADMIN — CARVEDILOL 25 MG: 25 TABLET, FILM COATED ORAL at 16:19

## 2020-11-23 RX ADMIN — HYDROCODONE BITARTRATE AND ACETAMINOPHEN 1 TABLET: 5; 325 TABLET ORAL at 07:35

## 2020-11-23 RX ADMIN — CARVEDILOL 25 MG: 25 TABLET, FILM COATED ORAL at 07:35

## 2020-11-23 RX ADMIN — ENOXAPARIN SODIUM 40 MG: 40 INJECTION SUBCUTANEOUS at 07:35

## 2020-11-23 RX ADMIN — CEFTRIAXONE SODIUM 1 G: 1 INJECTION, POWDER, FOR SOLUTION INTRAMUSCULAR; INTRAVENOUS at 13:36

## 2020-11-23 RX ADMIN — PREDNISONE 20 MG: 20 TABLET ORAL at 07:35

## 2020-11-23 RX ADMIN — HYDROCODONE BITARTRATE AND ACETAMINOPHEN 1 TABLET: 5; 325 TABLET ORAL at 02:25

## 2020-11-23 RX ADMIN — METFORMIN HYDROCHLORIDE 500 MG: 500 TABLET ORAL at 07:35

## 2020-11-23 RX ADMIN — INSULIN LISPRO 2 UNITS: 100 INJECTION, SOLUTION INTRAVENOUS; SUBCUTANEOUS at 11:28

## 2020-11-23 RX ADMIN — HYDROCHLOROTHIAZIDE 25 MG: 25 TABLET ORAL at 07:35

## 2020-11-23 ASSESSMENT — PAIN SCALES - GENERAL
PAINLEVEL_OUTOF10: 4
PAINLEVEL_OUTOF10: 3
PAINLEVEL_OUTOF10: 4

## 2020-11-23 ASSESSMENT — PAIN DESCRIPTION - LOCATION: LOCATION: KNEE

## 2020-11-23 ASSESSMENT — PAIN DESCRIPTION - ORIENTATION: ORIENTATION: RIGHT;LEFT

## 2020-11-23 NOTE — DISCHARGE INSTR - COC
Continuity of Care Form    Patient Name: Jonah Rao   :  1960  MRN:  [id number]    [facility] date:  2020  Discharge date:  ***    Code Status Order: Full Code   Advance Directives:   Advance Care Flowsheet Documentation       Date/Time Healthcare Directive Type of Healthcare Directive Copy in [address] Agent's Name Healthcare Agent's Phone Number    20 1743  No, patient does not have an advance directive for healthcare treatment -- -- -- -- --            Admitting Physician:  Rakel Chou MD  PCP: Nav Fisher MD    Discharging Nurse: [facility] Unit/Room#: 2055/2055-01  Discharging Unit Phone Number: ***    Emergency Contact:   Extended Emergency Contact Information  Primary Emergency Contact: Kevin Barnes *Ariana walter  Address: [address] [place] [address] Phone: [phone]  Mobile Phone: [phone]  Relation: Spouse    Past Surgical History:  Past Surgical History:   Procedure Laterality Date    COLONOSCOPY      normal    CYSTOSCOPY  14    several    CYSTOSCOPY  14    CYSTOSCOPY  14    CYSTOSCOPY N/A 2019    CYSTOSCOPY URETHRAL DILATATION performed by Nancy Concepcion MD at [address] N/A 2020    CYSTOSCOPY URETHRAL DILATATION WITH FISH TEST AND URINE CULTURE performed by Nancy Concepcion MD at [address] N/A 2020    CYSTOSCOPY AND RESECTION OF SMALL BLADDER TUMOR performed by Nancy Concepcion MD at [address],5+E/N,[facility] N/A 2018    HERNIA INCARCERATED UMBILICAL REPAIR [address] performed by Leanne Pack MD at [address]       Immunization History:   Immunization History   Administered Date(s) Administered    DTaP 1995    Influenza Vaccine, unspecified formulation 2008, 2010    Influenza, Quadv, IM, (6 mo and older Fluzone, Flulaval, Fluarix and 3 yrs and older Afluria) 10/30/2018    Influenza, Hassel Scarce, IM, PF (6 mo and older Fluzone, Flulaval, Fluarix, and 3 yrs and older Afluria) 09/10/2020    Pneumococcal Conjugate 13-valent ([id number]) 2016    Pneumococcal Polysaccharide (Lypxzwlal64) 2008    Tdap (Boostrix, Adacel) 2008, 2019       Active Problems:  Patient Active Problem List   Diagnosis Code    Bladder cancer ([facility] [address] C67.9    Essential hypertension I10    Sensorineural hearing loss of both ears H90.3    Morbid obesity ([facility] [address] E66.01    Diabetes mellitus type 2 in obese ([facility] [address] E11.69, E66.9    Localized edema R60.0    Diabetic polyneuropathy associated with type 2 diabetes mellitus ([facility] [address] E11.42    Septic joint of left knee joint ([facility] [address] M00.9    UTI (urinary tract infection) N39.0       Isolation/Infection:   Isolation            No Isolation          Patient Infection Status       Infection Onset Added Last Indicated Last Indicated By Review Planned Expiration Resolved Resolved By    None active    Resolved    COVID-19 Rule Out 20 Covid-19 Ambulatory (Ordered)   20 Rule-Out Test Resulted    COVID-19 Rule Out 20 COVID-19 (Ordered)   20             Nurse Assessment:  Last Vital Signs: BP (!) 162/75   Pulse 74   Temp 97.2 °F (36.2 °C) (Oral)   Resp 16   Ht 6' 5\" (1.956 m)   Wt (!) 381 lb 6.3 oz (173 kg)   SpO2 95%   BMI 45.23 kg/m²     Last documented pain score (0-10 scale): Pain Level: 4  Last Weight:   Wt Readings from Last 1 Encounters:   20 (!) 381 lb 6.3 oz (173 kg)     Mental Status:  {IP PT MENTAL STATUS::::0}    IV Access:  { [facility] IV ACCESS:905504161:::0}    Nursing Mobility/ADLs:  Walking   {[facility] DME ADLs:966865979:::0}  Transfer  {P DME ADLs:139082322:::0}  Bathing  {CHP DME ADLs:577067251:::0}  Dressing  {CHP DME ADLs:088568263:::0}  Toileting  {CHP DME ADLs:631103032:::0}  Feeding  {CHP DME ADLs:387821803:::0}  Med Admin  {CHP DME ADLs:570513750:::0}  Med Delivery   { B[facility]CH[facility] MED Delivery:879355024:::0}    Wound Care Documentation and Therapy:        Elimination:  Continence:    Bowel: {YES / QH:17912}  Bladder: {YES / NP:82313}  Urinary Catheter: {Urinary Catheter:231754862:::0}   Colostomy/Ileostomy/Ileal Conduit: {YES / AP:82849}       Date of Last BM: ***    Intake/Output Summary (Last 24 hours) at 2020 0805  Last data filed at 2020 1610  Gross per 24 hour   Intake --   Output 475 ml   Net -475 ml     I/O last 3 completed shifts:  In: -   Out: 475 [Urine:475]    Safety Concerns:     [address] Safety Concerns:834593650:::0}    Impairments/Disabilities:      [address] Impairments/Disabilities:334630510:::0}    Nutrition Therapy:  Current Nutrition Therapy:   [address] Diet List:283205394:::0}    Routes of Feeding: {[facility] DME Other Feedings:232932517:::0}  Liquids: {Slp liquid thickness:93052}  Daily Fluid Restriction: {CHP DME Yes amt example:401030723:::0}  Last Modified Barium Swallow with Video (Video Swallowing Test): {Done Not Done NGXM:466757150:::4}    Treatments at the Time of Hospital Discharge:   Respiratory Treatments: ***  Oxygen Therapy:  {Therapy; copd oxygen:20907:::0}  Ventilator:    {[facility] Vent List:448629019:::0}    Rehab Therapies: {THERAPEUTIC INTERVENTION:1989132961}  Weight Bearing Status/Restrictions: { CC Weight Bearin:::0}  Other Medical Equipment (for information only, NOT a DME order):  {EQUIPMENT:362354259}  Other Treatments: ***    Patient's personal belongings (please select all that are sent with patient):  {P DME Belongings:638972555:::0}    RN SIGNATURE:  {Esignature:835787180:::0}    CASE MANAGEMENT/SOCIAL WORK SECTION    Inpatient Status Date: ***    Readmission Risk Assessment Score:  Readmission Risk              Risk of Unplanned Readmission:        16           Discharging to Facility/ Agency   Name:   Address:  Phone:  Fax:    Dialysis Facility (if applicable)   Name:  Address:  Dialysis Schedule:  Phone:  Fax:    Case Manager/ signature: {Esignature:785010267:::0}    PHYSICIAN SECTION    Prognosis: {Prognosis:9298838107:::0}    Condition at Discharge: Bridger Mcgregor Patient Condition:619122874:::0}    Rehab Potential (if transferring to Rehab): {Prognosis:5324044215:::0}    Recommended Labs or Other Treatments After Discharge: ***    Physician Certification: I certify the above information and transfer of Yin Fay  is necessary for the continuing treatment of the diagnosis listed and that he requires {Admit to Appropriate Level of Care:42129:::0} for {GREATER/LESS:538239370} 30 days.      Update Admission H&P: {CHP DME Changes in HandP:951141075:::0}    PHYSICIAN SIGNATURE:  {Esignature:187076491:::0}

## 2020-11-23 NOTE — CARE COORDINATION
ONGOING DISCHARGE PLAN:    Spoke with patient regarding discharge plan and patient confirms that plan is still home. Declined VNS. PT recommending front wheeled walker. Order obtained. Active order for IV Rocephin and PO steroids. Should d/c today on PO antibiotics. Will continue to follow for additional discharge needs. Electronically signed by Aryan Rolle RN on 11/23/2020 at 10:10 AM    Walker delivered, however pt needs bariatric walker. Order and face sheet faxed to [facility] [place]. Electronically signed by Aryan Rolle RN on 11/23/2020 at 11:36 AM    Pt has order for OP PT. Offered to fax order, however spouse states she will find out where they can go per insurance, and will call herself. Electronically signed by Aryan Rolle RN on 11/23/2020 at 11:54 AM     Karl Cyr still not here and unable to be delivered for a couple hours. Pt is not willing to wait. Would like walker deliver to his home. Notified [facility] of this. Pt to call [facility] when he gets home to have delivered this evening or tomorrow morning. Pt states he feels comfortable getting into the house without walker.     Electronically signed by Aryan Rolle RN on 11/23/2020 at 4:43 PM

## 2020-11-23 NOTE — DISCHARGE SUMMARY
Discharge Summary    Hugh Haider  :  1960  MRN:  [id number]    Admit date:  2020  Discharge date:      Admitting Physician:  Gricelda Triana MD    PCP: Lisa Russell MD    Discharge Diagnoses:    Patient Active Problem List   Diagnosis    [facility]adder [facility])    Essential hypertension    Sensorineural hearing loss of both ears    Morbid obesity ([address]    Diabetes mellitus type 2 in obese ([address]    Localized edema    Diabetic polyneuropathy associated with type 2 diabetes mellitus ([address]    Septic joint of left knee joint ([address]    UTI (urinary tract infection)       Discharged Condition:  stable    Hospital Course:   Patient admitted for pain in left knee. Was found to have elevated white count and was started on abx for probable septic joint. Cultures of joint aspirate showed no growth at 2 days, but pt was improving with IV abx. Pain was better and WBC's were improved. Pain controlled with norco.  Pt discharged to home on prednisone, norco and po abx per ortho. Discharge Medications:       [facility] Home Medication Instructions DWI:[id number]    Printed on:20   Medication Information                      albuterol sulfate HFA (PROAIR HFA) 108 (90 Base) MCG/ACT inhaler  Inhale 2 puffs into the lungs every 6 hours as needed for Wheezing or Shortness of Breath             aspirin 81 MG tablet  Take 81 mg by mouth daily. carvedilol (COREG) 25 MG tablet  Take 1 tablet by mouth 2 times daily (with meals)             hydroCHLOROthiazide (HYDRODIURIL) 25 MG tablet  Take 1 tablet by mouth every morning             HYDROcodone-acetaminophen (NORCO) 5-325 MG per tablet  Take 1 tablet by mouth every 4 hours as needed for Pain for up to 5 days.              Lancets MISC  1 each by Does not apply route 2 times daily             metFORMIN (GLUCOPHAGE) 500 MG tablet  Take 1 tablet by mouth daily (with breakfast)             Multiple Vitamins-Minerals (MULTIVITAMIN & MINERAL PO)  Take 1 tablet by mouth daily. predniSONE (DELTASONE) 20 MG tablet  Take 1 tablet by mouth 2 times daily for 7 days             tamsulosin (FLOMAX) 0.4 MG capsule  Take 1 capsule by mouth daily                 Significant Diagnostic Studies:    CBC with Differential:    Lab Results   Component Value Date    WBC 12.8 11/22/2020    RBC 4.02 11/22/2020    HGB 12.6 11/22/2020    HCT 35.7 11/22/2020     11/22/2020    MCV 88.9 11/22/2020    MCH 31.3 11/22/2020    MCHC 35.2 11/22/2020    RDW 12.7 11/22/2020    LYMPHOPCT 14 11/22/2020    MONOPCT 11 11/22/2020    BASOPCT 1 11/22/2020    MONOSABS 1.30 11/22/2020    LYMPHSABS 1.80 11/22/2020    EOSABS 0.00 11/22/2020    BASOSABS 0.10 11/22/2020    DIFFTYPE NOT REPORTED 11/22/2020     Joint aspirate Culture:  No growth at 2 days  Radiology Review:  See results  Other diagnostic test:      Disposition:   home  Follow up with Alisia Sahu MD in 1-2 weeks.   Discharge patient to: Home with OP PT    Electronically signed by Yoan Moore MD on 11/23/2020 at 8:20 AM

## 2020-11-23 NOTE — PLAN OF CARE
Problem: Pain:  Goal: Control of chronic pain  11/23/2020 0524 by Jay Whelan RN  Outcome: Met This Shift  11/22/2020 1658 by Anthony Christine RN  Outcome: Ongoing     Problem: Falls - Risk of:  Goal: Will remain free from falls  11/23/2020 0524 by Jay Whelan RN  Outcome: Ongoing  11/22/2020 1658 by Anthony Christine RN  Outcome: Ongoing  Goal: Absence of physical injury  11/23/2020 0524 by Jay Whelan RN  Outcome: Ongoing  11/22/2020 1658 by Anthony Christine RN  Outcome: Ongoing     Problem: Pain:  Goal: Pain level will decrease  11/23/2020 0524 by Jay Whelan RN  Outcome: Ongoing  11/22/2020 1658 by Anthony Christine RN  Outcome: Ongoing  Goal: Control of acute pain  11/23/2020 0524 by Jay Whelan RN  Outcome: Ongoing  11/22/2020 1658 by Anthony Christine RN  Outcome: Ongoing     Problem: Skin Integrity:  Goal: Will show no infection signs and symptoms  11/23/2020 0524 by Jay Whelan RN  Outcome: Ongoing  11/22/2020 1658 by Anthony Christine RN  Outcome: Ongoing  Goal: Absence of new skin breakdown  11/23/2020 0524 by Jay Whelan RN  Outcome: Ongoing  11/22/2020 1658 by Anthony Christine RN  Outcome: Ongoing

## 2020-11-23 NOTE — CARE COORDINATION
[address] Encounter Date/Time: 2020 [phone] Account: [de-identified]    MRN: [id number]    Patient: Gely Carroll    Contact Serial #: [id number]      ENCOUNTER          Patient Class: I Private Enc? No Unit RM BD: Telly 15    Hospital Service: Med/Surg   ADM DX: Urinary tract infection *   ADM Provider: Chavez Brand MD   Procedure:     ATT Provider: Sowmya Londono MD   REF Provider:        PATIENT  Name: Gely Carroll : 1960 (60 yrs)   Address: [address] [place] [address] Sex: Male   [facility] [place] [address]         Marital Status:    Employer: [employer]         Jehovah's witness: Nazarene   Primary Care Provider: Sowmya Londono MD         Primary Phone: [phone]   EMERGENCY CONTACT   Contact Name Legal Guardian? Relationship to Patient Home Phone Work Phone   1. Shayan *hipaaAriana  2. *No Contact Specified*      Spouse    [phone]                 GUARANTOR            Guarantor: Gely Carroll     : 1960   Address: [address] [place] [place] [address] Sex: Male     [place] [address]     Relation to Patient: Self       Home Phone: [phone]   Guarantor ID: [id number]       Work Phone:     Guarantor Employer: [employer]         Status: UNKNOWN      COVERAGE        PRIMARY INSURANCE   Payor: [facility] Plan: [facility]   Payor Address: [place] [address], [address] [place] [address] Drive       Group Number: [id number] Insurance Type: [address] Name: Maritza Law : 1960   Subscriber ID: [id number] Hardy Median. Rel. to Sub: Self   SECONDARY INSURANCE   Payor:   Plan:     Payor Address:  ,           Group Number:   Insurance Type:     Subscriber Name:   Subscriber :     Subscriber ID:   Pat.  Rel. to Sub:           CSN                                    Req/Control # [Problem retrieving Specimen ID]                                   Order Date:  2020  [id number]                                          Patient Information      Name:  Ramiro Low Lucero Reynosodaksha  :  1960  Age:  63 y.o. Address:  [address] [facility] [address] [facility] [address], [facility]   Zip:  [address]  PCP: Juliann Hope MD Sex:  M  SSN: [id number]  Home Phone: [phone]  Work Phone:    Patient MRN:  [id number]    Alt Patient ID:  [id number]  PCP Phone: [phone]       Authorizing Provider Information       AUTHORIZING PROVIDER: Wyatt Spence MD  Physician ID: [id number]  NPI:  [id number]  Site:   Address: [address] [facility].  [address] [facility]  [address] [facility] [address] [place] [address] Zip: [address]  Phone: [phone]  Fax: [phone]               EQUIPMENT ORDERED  DME Order for Michelle Guerra as OP Joycelyn Contreras (ORD   #:   [id number]) Priority  Routine Class  Hospital Performed        Associated Diagnosis:  Pyogenic arthritis of left knee joint, due to unspecified organism ([facility] (M00.9 [ICD-10-CM])        Comments:    You must complete the order parameters below and add the medical necessity documentation for this DME in a separate note.     Bariatric Folding Walker with Wheels     Current patient weight: Weight: (!) 381 lb 6.3 oz (173 kg)  Current patient height: Height: 6' 5\" (195.6 cm)  Diagnosis: unsteady gait, arthritis  Duration: Purchase            Scheduling Instructions:                                 Specimen Source             Collection Date    Collection Time    Order Status    Expected Date                Electronically Signed By  Wyatt Spence MD Date  2020           Responsible Party [address] [facility], [address]   Relationship Account Type Home Phone   Nancy Guerrero [phone] [address] [facility] [address] [facility], [address] [place] [address] Self P/F [phone]   Employer   Work Phone   [address]     Primary Insurance  Insurance/Subscriber ID:  [id number]  [address] Name:  Jaison LANDERS              Relationship to Patient: SelfSigned ABN: N    Payor Name:  BCBS   [facility] PPO   Group: [id number]  Worker's Comp Date of Injury:

## 2020-11-23 NOTE — PROGRESS NOTES
Progress Note  11/23/2020 8:00 AM  Subjective:   Admit Date: 11/20/2020  PCP: Sania Garcia MD  CC: septic knee  Interval History: pt doing much better. Pain is much improved and meds are working. No new complaints. No SOB or CP. No N/V/D/C. Diet: DIET GENERAL;  Medications:   Scheduled Meds:   predniSONE  20 mg Oral BID    cefTRIAXone (ROCEPHIN) IV  1 g Intravenous Q12H    enoxaparin  40 mg Subcutaneous BID    hydroCHLOROthiazide  25 mg Oral QAM    carvedilol  25 mg Oral BID WC    aspirin  81 mg Oral Daily    metFORMIN  500 mg Oral Daily with breakfast    tamsulosin  0.4 mg Oral Daily    insulin lispro  0-12 Units Subcutaneous TID WC    insulin lispro  0-6 Units Subcutaneous Nightly     Continuous Infusions:   dextrose       CBC:   Recent Labs     11/20/20  1103 11/21/20  0546 11/22/20  0554   WBC 19.0* 16.4* 12.8*   HGB 15.3 12.9* 12.6*    262 262     BMP:    Recent Labs     11/20/20  1103 11/21/20  0546    138   K 3.7 3.7   CL 97* 102   CO2 28 25   BUN 14 17   CREATININE 0.70 0.59*   GLUCOSE 187* 183*     Hepatic: No results for input(s): AST, ALT, ALB, BILITOT, ALKPHOS in the last 72 hours. Troponin: No results for input(s): TROPONINI in the last 72 hours. BNP: No results for input(s): BNP in the last 72 hours. Lipids: No results for input(s): CHOL, HDL in the last 72 hours. Invalid input(s): LDLCALCU  INR: No results for input(s): INR in the last 72 hours.     Objective:   Vitals: BP (!) 162/75   Pulse 74   Temp 97.2 °F (36.2 °C) (Oral)   Resp 16   Ht 6' 5\" (1.956 m)   Wt (!) 381 lb 6.3 oz (173 kg)   SpO2 95%   BMI 45.23 kg/m²   General appearance: alert and cooperative with exam  Neck: supple, symmetrical, trachea midline  Lungs: clear to auscultation bilaterally  Heart: regular rate and rhythm, S1, S2 normal, no murmur, click, rub or gallop  Abdomen: soft, non-tender; bowel sounds normal; no masses,  no organomegaly  Extremities: extremities normal, atraumatic, no cyanosis or edema  Neurologic: Mental status: Alert, oriented, thought content appropriate    Assessment and Plan:   1. Septic joint- left knee- culture negative after 2 days and WBC's better- abx per ortho. Home today on po abx, pain meds, and steroids. Also will need OP PT.   2. HTN- stable  3.  DM- stable    Patient Active Problem List:     Bladder cancer ([address]     Essential hypertension     Sensorineural hearing loss of both ears     Morbid obesity ([address]     Diabetes mellitus type 2 in obese ([address]     Localized edema     Diabetic polyneuropathy associated with type 2 diabetes mellitus ([address]     Septic joint of left knee joint ([address]     UTI (urinary tract infection)      Electronically signed by Diana Frankel MD on 11/23/2020 at 8:00 AM

## 2020-11-23 NOTE — PROGRESS NOTES
Physical Therapy  [facility]: NANETTE RIVERA   Physical Therapy Progress Note    Date: 20  Patient Name: Ashok Rhodes       Room: [facility]  MRN: [id number]   Account: [de-identified]   : 1960  (61 y.o.) Gender: male           Past Medical History:  has a past medical history of Arthritis, Cancer ([facility] Depression, Diabetes mellitus ([facility] [place] (hard of hearing), Hypertension, Obesity, SOB (shortness of breath) on exertion, Umbilical hernia, and Wears glasses. Past Surgical History:   has a past surgical history that includes Cystocopy (14); Cystocopy (14); Cystoscopy (14); Colonoscopy (); repair umbilical GJSD,2+V/Q,ZIIDXO (N/A, 2018); Cystoscopy (N/A, 2019); Cystoscopy (N/A, 2020); and Cystoscopy (N/A, 2020). Additional Pertinent Hx: Ashok Rhodes is an 63 y.o. male. Patient states he is having increased urinary frequency and some burning. Recently had treatment for bladder cancer, final cancer treatment was on . Left knee has been painful and swollen since this past . He denies any recent injury. He is also complaining of right shoulder and lower back pain. The most severe pain is in the left knee. pt denies any fever or chills. Dr York Siemens consulted and performed R knee joint aspiration for joint fluid culture on . past medical history of Arthritis, Cancer ([facility] Depression, Diabetes mellitus ([facility] [place] (hard of hearing), Hypertension, Obesity, SOB (shortness of breath) on exertion, Umbilical hernia, and Wears glasses. Restrictions/Precautions  Restrictions/Precautions: Fall Risk(Peripheral Iv L antecubital, activity as tolerated)  Required Braces or Orthoses?: No       Comments: Pt sitting up in chair upon arrival to room. Agreeable to P.T.     Vital Signs  Patient Currently in Pain: Yes  Pain Assessment: 0-10  Pain Level: 3  Pain Location: Knee  Pain Orientation: Right;Left       Transfers:  Sit to Stand: Supervision  Stand to sit: Supervision                 Ambulation 1  Surface: level tile  Device: Rolling Walker  Assistance: Supervision;Stand by assistance  Quality of Gait: slow steady pace, minimal use of UE's on RW today  Gait Deviations: Slow Jennie  Distance: 672xqb7, 265x1  Comments: Pt reported feeling better the longer he was up walking/moving        Stairs/Curb  Stairs?: Yes  Stairs  # Steps : 10  Stairs Height: 4\"(and 6\")  Rails: Right ascending  Device: No Device  Assistance: Stand by assistance;Supervision  Comment: Reviewed and demonstrated correct step to sequencing to ascend/decend steps. Pt able to demonstrate with good ability and safe technique. EXERCISES    Other exercises?: Yes  Other exercises 1: (B) LE seated x 15  Other exercises 2: (B) LE seated Blue t-band x 15  Other exercises 3: (B) LE standing ex x 10, RW used for support  Other exercises 4: Pt issued seated and standing HEP along with blue t-band              PT Equipment Recommendations  Equipment Needed: Yes  Mobility Devices: Michael Lajas: Rolling(Bariatric rolling walker; pt is 6' 5\" and 381 lbs)       Patient Education  New Education Provided:  gait training, stair training, general LE strengthening ex  Learner:patient  Method: demonstration, explanation and handout       Outcome: demonstrated understanding     Current Treatment Recommendations: Endurance Training, Home Exercise Program, Transfer Training, Stair training, Gait Training, Pain Management, Patient/Caregiver Education & Training, ROM, Strengthening, Equipment Evaluation, Education, & procurement    Conditions Requiring Skilled Therapeutic Intervention  Body structures, Functions, Activity limitations: Decreased functional mobility ; Increased pain;Decreased endurance  Assessment: pt is Mod I with bed mobility (elevated HOB and rails used), CGA for transfers and gait with wheeled walker. Plan to assess stairs and progress gait distance.   Treatment Diagnosis: UTI and Pyogenic arthritis of left knee joint  Prognosis: Good  REQUIRES PT FOLLOW UP: Yes  Discharge Recommendations: Patient would benefit from continued therapy after discharge    Goals  Short term goals  Time Frame for Short term goals: 2-3 treatments per 3 days  Short term goal 1: pt to ambulate 76' with wheeled walker Mod I to improve safety and independence with ambulating household distances  Short term goal 2: pt to demonstrate independepnce with LE strength/ROM HEP to demonstrate independence with self-care and self-management of his condition  Short term goal 3: pt to ascend/descend 4 steps ( or 6\" step-ups) Independently with use of R hand rail to increase safety and independence with stair negotiation  Short term goal 4: pt to demonstrates transfers MOD I with wheeled walker  Short term goal 5: pt to independently demonstrate bed mobility with the bed flattened and no hand rail.        11/23/20 1002   PT Individual Minutes   Time In 1757   Time Out 0920   Minutes 56       Electronically signed by Daniel Jackson PTA on 11/23/20 at 1:54 PM EST

## 2020-11-23 NOTE — PROGRESS NOTES
Spoke with Dr. Luis M Ocampo via telephone. Patient is ok to discharge. Will not need any po antibiotics.

## 2020-11-24 ENCOUNTER — TELEPHONE (OUTPATIENT)
Dept: PRIMARY CARE CLINIC | Age: 60
End: 2020-11-24

## 2020-11-24 NOTE — TELEPHONE ENCOUNTER
[facility] Transitions Initial Follow Up Call    Outreach made within 2 business days of discharge: Yes    Patient: Luigi Monterroso Patient : 1960   MRN: [id number]  Reason for Admission: There are no discharge diagnoses documented for the most recent discharge. Discharge Date: 20       Spoke with: Fabián    Discharge department/facility: [address] [place] [address] Interactive Patient Contact:  Was patient able to fill all prescriptions: Yes  Was patient instructed to bring all medications to the follow-up visit: Yes  Is patient taking all medications as directed in the discharge summary?  Yes  Does patient understand their discharge instructions: Yes  Does patient have questions or concerns that need addressed prior to 7-14 day follow up office visit: no    Scheduled appointment with PCP within 7-14 days    Follow Up  Future Appointments   Date Time Provider Jaison Lyman   2020 11:20 AM MD JACQUE Fierro [facility]   2/10/2021  1:00 PM MD JACQUE Fierro 61, [place]

## 2020-11-27 LAB
CULTURE: NORMAL
DIRECT EXAM: NORMAL
DIRECT EXAM: NORMAL
Lab: NORMAL
SPECIMEN DESCRIPTION: NORMAL

## 2020-11-28 ENCOUNTER — PATIENT MESSAGE (OUTPATIENT)
Dept: PRIMARY CARE CLINIC | Age: 60
End: 2020-11-28

## 2020-12-07 ENCOUNTER — HOSPITAL ENCOUNTER (OUTPATIENT)
Dept: LAB | Age: 60
Setting detail: SPECIMEN
Discharge: HOME | End: 2020-12-07
Payer: COMMERCIAL

## 2020-12-07 ENCOUNTER — TELEPHONE (OUTPATIENT)
Dept: PRIMARY CARE CLINIC | Age: 60
End: 2020-12-07

## 2020-12-07 PROCEDURE — U0003 INFECTIOUS AGENT DETECTION BY NUCLEIC ACID (DNA OR RNA); SEVERE ACUTE RESPIRATORY SYNDROME CORONAVIRUS 2 (SARS-COV-2) (CORONAVIRUS DISEASE [COVID-19]), AMPLIFIED PROBE TECHNIQUE, MAKING USE OF HIGH THROUGHPUT TECHNOLOGIES AS DESCRIBED BY CMS-2020-01-R: HCPCS

## 2020-12-07 NOTE — TELEPHONE ENCOUNTER
Aura Manjarrez Pt called with the fax number to submit his [facility] paperwork that number is [phone].   Thank you

## 2020-12-09 LAB — SARS-COV-2, NAA: NOT DETECTED

## 2020-12-10 ENCOUNTER — HOSPITAL ENCOUNTER (OUTPATIENT)
Age: 60
Setting detail: SPECIMEN
Discharge: HOME | End: 2020-12-10
Payer: COMMERCIAL

## 2020-12-10 ENCOUNTER — OFFICE VISIT (OUTPATIENT)
Dept: PRIMARY CARE CLINIC | Age: 60
End: 2020-12-10
Payer: COMMERCIAL

## 2020-12-10 VITALS
DIASTOLIC BLOOD PRESSURE: 82 MMHG | HEIGHT: 77 IN | TEMPERATURE: 97.5 F | SYSTOLIC BLOOD PRESSURE: 136 MMHG | WEIGHT: 315 LBS | BODY MASS INDEX: 37.19 KG/M2 | OXYGEN SATURATION: 96 % | HEART RATE: 83 BPM

## 2020-12-10 LAB
ABSOLUTE EOS #: 0.17 K/UL (ref 0–0.44)
ABSOLUTE IMMATURE GRANULOCYTE: 0.03 K/UL (ref 0–0.3)
ABSOLUTE LYMPH #: 1.93 K/UL (ref 1.1–3.7)
ABSOLUTE MONO #: 0.97 K/UL (ref 0.1–1.2)
ALBUMIN SERPL-MCNC: 3.3 G/DL (ref 3.5–5.2)
ALBUMIN/GLOBULIN RATIO: 0.8 (ref 1–2.5)
ALP BLD-CCNC: 97 U/L (ref 40–129)
ALT SERPL-CCNC: 45 U/L (ref 5–41)
ANION GAP SERPL CALCULATED.3IONS-SCNC: 14 MMOL/L (ref 9–17)
AST SERPL-CCNC: 25 U/L
BASOPHILS # BLD: 1 % (ref 0–2)
BASOPHILS ABSOLUTE: 0.06 K/UL (ref 0–0.2)
BILIRUB SERPL-MCNC: 0.55 MG/DL (ref 0.3–1.2)
BILIRUBIN DIRECT: 0.17 MG/DL
BILIRUBIN, INDIRECT: 0.38 MG/DL (ref 0–1)
BILIRUBIN, POC: ABNORMAL
BLOOD URINE, POC: NEGATIVE
BUN BLDV-MCNC: 11 MG/DL (ref 8–23)
BUN/CREAT BLD: ABNORMAL (ref 9–20)
C-REACTIVE PROTEIN: 64.4 MG/L (ref 0–5)
CALCIUM SERPL-MCNC: 9.2 MG/DL (ref 8.6–10.4)
CHLORIDE BLD-SCNC: 100 MMOL/L (ref 98–107)
CLARITY, POC: CLEAR
CO2: 25 MMOL/L (ref 20–31)
COLOR, POC: YELLOW
CREAT SERPL-MCNC: 0.65 MG/DL (ref 0.7–1.2)
DIFFERENTIAL TYPE: ABNORMAL
EOSINOPHILS RELATIVE PERCENT: 2 % (ref 1–4)
GFR AFRICAN AMERICAN: >60 ML/MIN
GFR NON-AFRICAN AMERICAN: >60 ML/MIN
GFR SERPL CREATININE-BSD FRML MDRD: ABNORMAL ML/MIN/{1.73_M2}
GFR SERPL CREATININE-BSD FRML MDRD: ABNORMAL ML/MIN/{1.73_M2}
GLOBULIN: ABNORMAL G/DL (ref 1.5–3.8)
GLUCOSE BLD-MCNC: 188 MG/DL (ref 70–99)
GLUCOSE URINE, POC: NEGATIVE
HBA1C MFR BLD: 7.3 %
HCT VFR BLD CALC: 38.2 % (ref 40.7–50.3)
HEMOGLOBIN: 12.4 G/DL (ref 13–17)
IMMATURE GRANULOCYTES: 0 %
KETONES, POC: NEGATIVE
LEUKOCYTE EST, POC: ABNORMAL
LYMPHOCYTES # BLD: 18 % (ref 24–43)
MCH RBC QN AUTO: 29.7 PG (ref 25.2–33.5)
MCHC RBC AUTO-ENTMCNC: 32.5 G/DL (ref 28.4–34.8)
MCV RBC AUTO: 91.4 FL (ref 82.6–102.9)
MONOCYTES # BLD: 9 % (ref 3–12)
NITRITE, POC: NEGATIVE
NRBC AUTOMATED: 0 PER 100 WBC
PDW BLD-RTO: 12.2 % (ref 11.8–14.4)
PH, POC: 6
PLATELET # BLD: 313 K/UL (ref 138–453)
PLATELET ESTIMATE: ABNORMAL
PMV BLD AUTO: 10.6 FL (ref 8.1–13.5)
POTASSIUM SERPL-SCNC: 4.5 MMOL/L (ref 3.7–5.3)
PROTEIN, POC: ABNORMAL
RBC # BLD: 4.18 M/UL (ref 4.21–5.77)
RBC # BLD: ABNORMAL 10*6/UL
RHEUMATOID FACTOR: 10.2 IU/ML
SEDIMENTATION RATE, ERYTHROCYTE: 39 MM (ref 0–20)
SEG NEUTROPHILS: 70 % (ref 36–65)
SEGMENTED NEUTROPHILS ABSOLUTE COUNT: 7.82 K/UL (ref 1.5–8.1)
SODIUM BLD-SCNC: 139 MMOL/L (ref 135–144)
SPECIFIC GRAVITY, POC: 1.02
TOTAL CK: 66 U/L (ref 39–308)
TOTAL PROTEIN: 7.2 G/DL (ref 6.4–8.3)
UROBILINOGEN, POC: ABNORMAL
WBC # BLD: 11 K/UL (ref 3.5–11.3)
WBC # BLD: ABNORMAL 10*3/UL

## 2020-12-10 PROCEDURE — 81003 URINALYSIS AUTO W/O SCOPE: CPT | Performed by: FAMILY MEDICINE

## 2020-12-10 PROCEDURE — 83036 HEMOGLOBIN GLYCOSYLATED A1C: CPT | Performed by: FAMILY MEDICINE

## 2020-12-10 PROCEDURE — 36416 COLLJ CAPILLARY BLOOD SPEC: CPT | Performed by: FAMILY MEDICINE

## 2020-12-10 PROCEDURE — 1111F DSCHRG MED/CURRENT MED MERGE: CPT | Performed by: FAMILY MEDICINE

## 2020-12-10 PROCEDURE — 99495 TRANSJ CARE MGMT MOD F2F 14D: CPT | Performed by: FAMILY MEDICINE

## 2020-12-10 RX ORDER — FUROSEMIDE 40 MG/1
40 TABLET ORAL DAILY
Qty: 90 TABLET | Refills: 3 | Status: SHIPPED | OUTPATIENT
Start: 2020-12-10 | End: 2021-02-10

## 2020-12-10 RX ORDER — POTASSIUM CHLORIDE 20 MEQ/1
20 TABLET, EXTENDED RELEASE ORAL DAILY
Qty: 90 TABLET | Refills: 3 | Status: SHIPPED | OUTPATIENT
Start: 2020-12-10 | End: 2021-12-22

## 2020-12-10 ASSESSMENT — ENCOUNTER SYMPTOMS
VOMITING: 0
WHEEZING: 0
COUGH: 0
EYE DISCHARGE: 0
NAUSEA: 0
ABDOMINAL PAIN: 0
SORE THROAT: 0
EYE REDNESS: 0
DIARRHEA: 0
SHORTNESS OF BREATH: 0
RHINORRHEA: 0

## 2020-12-10 ASSESSMENT — PATIENT HEALTH QUESTIONNAIRE - PHQ9
SUM OF ALL RESPONSES TO PHQ QUESTIONS 1-9: 0
SUM OF ALL RESPONSES TO PHQ QUESTIONS 1-9: 0
1. LITTLE INTEREST OR PLEASURE IN DOING THINGS: 0
SUM OF ALL RESPONSES TO PHQ QUESTIONS 1-9: 0
SUM OF ALL RESPONSES TO PHQ9 QUESTIONS 1 & 2: 0
2. FEELING DOWN, DEPRESSED OR HOPELESS: 0

## 2020-12-10 NOTE — PROGRESS NOTES
Post-Discharge Transitional Care Management Services or Hospital Follow Up      Fabián Russell   YOB: 1960    Date of Office Visit:  12/10/2020  Date of Hospital Admission: 11/20/20  Date of Hospital Discharge: 11/23/20  Readmission Risk Score(high >=14%. Medium >=10%):Readmission Risk Score: 16      Care management risk score Rising risk (score 2-5) and Complex Care (Scores >=6): 2     Non face to face  following discharge, date last encounter closed (first attempt may have been earlier): *No documented post hospital discharge outreach found in the last 14 days *No documented post hospital discharge outreach found in the last 14 days    Call initiated 2 business days of discharge: *No response recorded in the last 14 days     Patient Active Problem List   Diagnosis    Bladder cancer ([address]    Essential hypertension    Sensorineural hearing loss of both ears    Morbid obesity ([address]    Diabetes mellitus type 2 in obese ([address]    Localized edema    Diabetic polyneuropathy associated with type 2 diabetes mellitus ([address]    Septic joint of left knee joint ([address]    UTI (urinary tract infection)       No Known Allergies    Medications listed as ordered at the time of discharge from hospital   Niraj Marrero Fabián LEESA   Home Medication Instructions COLIN:    Printed on:12/10/20 1019   Medication Information                      albuterol sulfate HFA (PROAIR HFA) 108 (90 Base) MCG/ACT inhaler  Inhale 2 puffs into the lungs every 6 hours as needed for Wheezing or Shortness of Breath             aspirin 81 MG tablet  Take 81 mg by mouth daily.              carvedilol (COREG) 25 MG tablet  Take 1 tablet by mouth 2 times daily (with meals)             hydroCHLOROthiazide (HYDRODIURIL) 25 MG tablet  Take 1 tablet by mouth every morning             metFORMIN (GLUCOPHAGE) 500 MG tablet  Take 1 tablet by mouth daily (with breakfast)             Multiple Vitamins-Minerals (MULTIVITAMIN & MINERAL PO)  Take 1 tablet by mouth daily.             tamsulosin (FLOMAX) 0.4 MG capsule  Take 1 capsule by mouth daily                   Medications marked \"taking\" at this time  Outpatient Medications Marked as Taking for the 12/10/20 encounter (Office Visit) with Sania Garcia MD   Medication Sig Dispense Refill    tamsulosin (FLOMAX) 0.4 MG capsule Take 1 capsule by mouth daily 30 capsule 3    hydroCHLOROthiazide (HYDRODIURIL) 25 MG tablet Take 1 tablet by mouth every morning 90 tablet 3    metFORMIN (GLUCOPHAGE) 500 MG tablet Take 1 tablet by mouth daily (with breakfast) 90 tablet 1    carvedilol (COREG) 25 MG tablet Take 1 tablet by mouth 2 times daily (with meals) 180 tablet 3    albuterol sulfate HFA (PROAIR HFA) 108 (90 Base) MCG/ACT inhaler Inhale 2 puffs into the lungs every 6 hours as needed for Wheezing or Shortness of Breath 1 Inhaler 5    aspirin 81 MG tablet Take 81 mg by mouth daily.  Multiple Vitamins-Minerals (MULTIVITAMIN & MINERAL PO) Take 1 tablet by mouth daily. Medications patient taking as of now reconciled against medications ordered at time of hospital discharge: Yes    Chief Complaint   Patient presents with    Follow-Up from Hospital       HPI    Inpatient course: Discharge summary reviewed- see chart. Interval history/Current status: Patient was admitted to hospital with inability to ambulate. Patient was unsafe walking. Due to patient's obesity, with falls is unable to get back up. Patient's current weight 382 pounds. Patient states started having multiple joint involvement and swelling after his bladder irrigation with chemotherapy. States never had anything like this before. Discomfort had started in the left knee. Had swelling. Patient Was Evaluated in Emergency Room and Due to inability to ambulate and high risk for falls. Initially was thought that he might have a bladder infection but his urine culture came back negative.   Patient was also felt to possibly have an infected left knee but his culture was negative. Patient had aspiration done that showed only blood by orthopedic surgery. Patient states now having multiple joint involvement. States in the hip as well as the left knee as well as in the ankles. States has now been having marked swelling in the lower extremities as well. Has been using vascular stockings. Has been using his Norco for his lower back pain. Denies any fevers or chills. Denies any other associated complaints. In the hospital, patient had a CRP of 284 with a normal uric acid. His white count was 12.8. Hospital notes were reviewed. Review of Systems   Constitutional: Negative for chills and fever. HENT: Negative for rhinorrhea and sore throat. Eyes: Negative for discharge and redness. Respiratory: Negative for cough, shortness of breath and wheezing. Cardiovascular: Positive for leg swelling. Negative for chest pain and palpitations. Gastrointestinal: Negative for abdominal pain, diarrhea, nausea and vomiting. Genitourinary: Negative for difficulty urinating, dysuria, frequency and urgency. Musculoskeletal: Positive for arthralgias. Negative for myalgias. Neurological: Negative for dizziness, light-headedness and headaches. Psychiatric/Behavioral: Negative for sleep disturbance. Vitals:    12/10/20 0957   BP: 136/82   Pulse: 83   Temp: 97.5 °F (36.4 °C)   SpO2: 96%   Weight: (!) 382 lb 3.2 oz (173.4 kg)   Height: 6' 5.04\" (1.957 m)     Body mass index is 45.28 kg/m². Wt Readings from Last 3 Encounters:   12/10/20 (!) 382 lb 3.2 oz (173.4 kg)   11/22/20 (!) 381 lb 6.3 oz (173 kg)   11/13/20 (!) 390 lb 12.8 oz (177.3 kg)     BP Readings from Last 3 Encounters:   12/10/20 136/82   11/23/20 (!) 162/75   11/13/20 (!) 147/84       Physical Exam  Vitals signs and nursing note reviewed. Constitutional:       General: He is not in acute distress. Appearance: He is well-developed. He is not ill-appearing.    HENT:      Head: Normocephalic and atraumatic. Right Ear: External ear normal.      Left Ear: External ear normal.   Eyes:      General: No scleral icterus. Right eye: No discharge. Left eye: No discharge. Conjunctiva/sclera: Conjunctivae normal.      Pupils: Pupils are equal, round, and reactive to light. Neck:      Thyroid: No thyromegaly. Trachea: No tracheal deviation. Cardiovascular:      Rate and Rhythm: Normal rate and regular rhythm. Heart sounds: Normal heart sounds. Pulmonary:      Effort: Pulmonary effort is normal. No respiratory distress. Breath sounds: Normal breath sounds. No wheezing. Musculoskeletal:      Right lower leg: Edema present. Left lower leg: Edema present. Comments: 2+ edema lower extremities   Lymphadenopathy:      Cervical: No cervical adenopathy. Skin:     General: Skin is warm. Findings: No rash. Neurological:      Mental Status: He is alert and oriented to person, place, and time. Psychiatric:         Mood and Affect: Mood normal.         Behavior: Behavior normal.         Thought Content: Thought content normal.             Assessment/Plan:  1. Polyarthralgia  Blood work repeated. Referral to rheumatology for second opinion. Etiology is unclear at this point  - CBC With Auto Differential; Future  - CK; Future  - C-Reactive Protein; Future  - Sedimentation Rate; Future  - Basic Metabolic Panel; Future  - Rheumatoid Factor; Future  - Cyclic Citrul Peptide Antibody, IgG; Future  - MD DISCHARGE MEDS RECONCILED W/ CURRENT OUTPATIENT MED LIST    2. Urinary tract infection without hematuria, site unspecified  No apparent bladder infection currently. Urine sent for repeat culture  - POCT Urinalysis No Micro (Auto)  - MD DISCHARGE MEDS RECONCILED W/ CURRENT OUTPATIENT MED LIST    3. Diabetes mellitus type 2 in obese (HCC)  Stable. Well-controlled.   - MD COLLECTION CAPILLARY BLOOD SPECIMEN  - POCT glycosylated hemoglobin (Hb A1C)  - MD

## 2020-12-11 ENCOUNTER — HOSPITAL ENCOUNTER (OUTPATIENT)
Age: 60
Setting detail: OUTPATIENT SURGERY
Discharge: HOME | End: 2020-12-11
Attending: UROLOGY | Admitting: UROLOGY
Payer: COMMERCIAL

## 2020-12-11 ENCOUNTER — TELEPHONE (OUTPATIENT)
Dept: PRIMARY CARE CLINIC | Age: 60
End: 2020-12-11

## 2020-12-11 VITALS
OXYGEN SATURATION: 95 % | TEMPERATURE: 97.5 F | HEIGHT: 77 IN | RESPIRATION RATE: 19 BRPM | BODY MASS INDEX: 37.19 KG/M2 | HEART RATE: 88 BPM | WEIGHT: 315 LBS | SYSTOLIC BLOOD PRESSURE: 132 MMHG | DIASTOLIC BLOOD PRESSURE: 87 MMHG

## 2020-12-11 LAB
-: ABNORMAL
AMORPHOUS: ABNORMAL
BACTERIA: ABNORMAL
BILIRUBIN URINE: NEGATIVE
CASTS UA: ABNORMAL /LPF
CCP IGG ANTIBODIES: <1.5 U/ML
COLOR: YELLOW
COMMENT UA: ABNORMAL
CRYSTALS, UA: ABNORMAL /HPF
CULTURE: NORMAL
EPITHELIAL CELLS UA: ABNORMAL /HPF
GLUCOSE BLD-MCNC: 186 MG/DL (ref 75–110)
GLUCOSE URINE: NEGATIVE
KETONES, URINE: NEGATIVE
LEUKOCYTE ESTERASE, URINE: ABNORMAL
Lab: NORMAL
MUCUS: ABNORMAL
NITRITE, URINE: NEGATIVE
OTHER OBSERVATIONS UA: ABNORMAL
PH UA: 6 (ref 5–8)
PROTEIN UA: NEGATIVE
RBC UA: ABNORMAL /HPF
RENAL EPITHELIAL, UA: ABNORMAL /HPF
SPECIFIC GRAVITY UA: 1.02 (ref 1–1.03)
SPECIMEN DESCRIPTION: NORMAL
TRICHOMONAS: ABNORMAL
TURBIDITY: CLEAR
URINE HGB: NEGATIVE
UROBILINOGEN, URINE: NORMAL
WBC UA: ABNORMAL /HPF
YEAST: ABNORMAL

## 2020-12-11 PROCEDURE — 7100000011 HC PHASE II RECOVERY - ADDTL 15 MIN: Performed by: UROLOGY

## 2020-12-11 PROCEDURE — 81001 URINALYSIS AUTO W/SCOPE: CPT

## 2020-12-11 PROCEDURE — 82947 ASSAY GLUCOSE BLOOD QUANT: CPT

## 2020-12-11 PROCEDURE — 87086 URINE CULTURE/COLONY COUNT: CPT

## 2020-12-11 PROCEDURE — 3600000012 HC SURGERY LEVEL 2 ADDTL 15MIN: Performed by: UROLOGY

## 2020-12-11 PROCEDURE — 88120 CYTP URNE 3-5 PROBES EA SPEC: CPT

## 2020-12-11 PROCEDURE — 2709999900 HC NON-CHARGEABLE SUPPLY: Performed by: UROLOGY

## 2020-12-11 PROCEDURE — 3600000002 HC SURGERY LEVEL 2 BASE: Performed by: UROLOGY

## 2020-12-11 PROCEDURE — 7100000010 HC PHASE II RECOVERY - FIRST 15 MIN: Performed by: UROLOGY

## 2020-12-11 PROCEDURE — 6370000000 HC RX 637 (ALT 250 FOR IP): Performed by: UROLOGY

## 2020-12-11 RX ORDER — LIDOCAINE HYDROCHLORIDE 20 MG/ML
JELLY TOPICAL PRN
Status: DISCONTINUED | OUTPATIENT
Start: 2020-12-11 | End: 2020-12-11 | Stop reason: ALTCHOICE

## 2020-12-11 RX ORDER — CEPHALEXIN 500 MG/1
500 CAPSULE ORAL 3 TIMES DAILY
Qty: 15 CAPSULE | Refills: 0 | Status: SHIPPED | OUTPATIENT
Start: 2020-12-11 | End: 2020-12-16

## 2020-12-11 ASSESSMENT — PAIN - FUNCTIONAL ASSESSMENT: PAIN_FUNCTIONAL_ASSESSMENT: 0-10

## 2020-12-11 ASSESSMENT — PAIN SCALES - GENERAL: PAINLEVEL_OUTOF10: 0

## 2020-12-11 NOTE — OP NOTE
Operative Note      Patient: Dejuan Martin  YOB: 1960  MRN: [id number]    Date of Procedure: 12/11/2020    Pre-Op Diagnosis: BLADDER CANCER    Post-Op Diagnosis: No recurrent tumor       Procedure(s):  CYSTOSCOPY    Surgeon(s):  Darian Phillips MD    Assistant:   * No surgical staff found *    Anesthesia: Local    Estimated Blood Loss (mL): Minimal    Complications: None    Specimens:   ID Type Source Tests Collected by Time Destination   1 : urine from cystoscopy Urine Bladder UA W/REFLEX CULTURE, Yuli NAVAS MD 12/11/2020 8317        Implants:  * No implants in log *      Drains: * No LDAs found *    Indications: 56-year-old male, history of bladder cancer blood for surveillance cystoscopy    Detailed Description of Procedure:   Patient was brought to the operating room, positioned in supine patient identification procedure identification prepping and draping in the usual sterile manner    We entered the bladder with the flexible cystoscope, we examined the bladder, there was no evidence of bladder tumor, areas of previous tumors demonstrate mucosal fibrosis.     The trigone is normal, ureteral orifices effluxing clear urine    Anterior and lateral bladder walls all examined and normal    The bladder was emptied the cystoscope removed returned to recovery room in stable condition        Electronically signed by Darian Phillips MD on 12/11/2020 at 8:24 AM

## 2020-12-11 NOTE — H&P
 Umbilical hernia     Wears glasses        SURGICAL HISTORY       Past Surgical History:   Procedure Laterality Date    COLONOSCOPY  2017    normal    CYSTOSCOPY  14    several    CYSTOSCOPY  14    CYSTOSCOPY  14    CYSTOSCOPY N/A 2019    CYSTOSCOPY URETHRAL DILATATION performed by Zaid Momin MD at [address] [place] [address] 2020    CYSTOSCOPY URETHRAL DILATATION WITH FISH TEST AND URINE CULTURE performed by Zaid Momin MD at [address] [place] [address] N/A 2020    CYSTOSCOPY AND RESECTION OF SMALL BLADDER TUMOR performed by Zaid Momin MD at [address],6+P/V,BSPIQT N/A 2018    HERNIA INCARCERATED UMBILICAL REPAIR W/MESH performed by Sam Arzate MD at [facility] 115 HISTORY       Family History   Problem Relation Age of Onset    Diabetes Father     Coronary Art Dis Father     Lung Cancer Father     Diabetes Mother     Hypertension Mother     Depression Mother     Cancer Brother         bladder       SOCIAL HISTORY       Social History     Socioeconomic History    Marital status:      Spouse name: Not on file    Number of children: Not on file    Years of education: Not on file    Highest education level: Not on file   Occupational History    Not on file   Social Needs    Financial resource strain: Not on file    Food insecurity     Worry: Not on file     Inability: Not on file    Transportation needs     Medical: Not on file     Non-medical: Not on file   Tobacco Use    Smoking status: Former Smoker     Packs/day: 1.00     Years: 20.00     Pack years: 20.00     Types: Cigarettes     Start date: 1977     Last attempt to quit: 2001     Years since quittin.8    Smokeless tobacco: Former User     Quit date: 2004   Substance and Sexual Activity    Alcohol use: Yes     Types: 1 Cans of beer per week     Comment: rare    Drug use: No    Sexual activity: Not on file   Lifestyle    Physical activity     Days per week: Not on file     Minutes per session: Not on file    Stress: Not on file   Relationships    Social connections     Talks on phone: Not on file     Gets together: Not on file     Attends Yarsanism service: Not on file     Active member of club or organization: Not on file     Attends meetings of clubs or organizations: Not on file     Relationship status: Not on file    Intimate partner violence     Fear of current or ex partner: Not on file     Emotionally abused: Not on file     Physically abused: Not on file     Forced sexual activity: Not on file   Other Topics Concern    Not on file   Social History Narrative    Not on file         REVIEW OF SYSTEMS      No Known Allergies    No current facility-administered medications on file prior to encounter. Current Outpatient Medications on File Prior to Encounter   Medication Sig Dispense Refill    tamsulosin (FLOMAX) 0.4 MG capsule Take 1 capsule by mouth daily 30 capsule 3    metFORMIN (GLUCOPHAGE) 500 MG tablet Take 1 tablet by mouth daily (with breakfast) 90 tablet 1    carvedilol (COREG) 25 MG tablet Take 1 tablet by mouth 2 times daily (with meals) 180 tablet 3    albuterol sulfate HFA (PROAIR HFA) 108 (90 Base) MCG/ACT inhaler Inhale 2 puffs into the lungs every 6 hours as needed for Wheezing or Shortness of Breath 1 Inhaler 5    aspirin 81 MG tablet Take 81 mg by mouth daily.  Multiple Vitamins-Minerals (MULTIVITAMIN & MINERAL PO) Take 1 tablet by mouth daily. Notation: Above medications are not currently reconciled at time of signing this H&P, to be reconciled in pre-op per RN. Negative except for what is mentioned in the HPI. GENERAL PHYSICAL EXAM     Vitals: Review vitals per RN flowsheet. GENERAL APPEARANCE:   Fabián Herman is 63 y.o.,  male, nourished, conscious, alert. Does not appear to be in distress or pain at this time.                             SKIN:  Warm, dry, no cyanosis or

## 2020-12-12 LAB
CULTURE: NO GROWTH
Lab: NORMAL
SPECIMEN DESCRIPTION: NORMAL

## 2020-12-17 LAB — UROTHELIAL CANCER DETECTION: NORMAL

## 2020-12-22 PROBLEM — N39.0 UTI (URINARY TRACT INFECTION): Status: RESOLVED | Noted: 2020-11-22 | Resolved: 2020-12-22

## 2021-02-02 ENCOUNTER — HOSPITAL ENCOUNTER (OUTPATIENT)
Dept: GENERAL RADIOLOGY | Age: 61
Discharge: HOME | End: 2021-02-04
Payer: COMMERCIAL

## 2021-02-02 ENCOUNTER — HOSPITAL ENCOUNTER (OUTPATIENT)
Age: 61
Discharge: HOME | End: 2021-02-04
Payer: COMMERCIAL

## 2021-02-02 DIAGNOSIS — R52 PAIN: ICD-10-CM

## 2021-02-02 PROCEDURE — 73562 X-RAY EXAM OF KNEE 3: CPT

## 2021-02-02 PROCEDURE — 73110 X-RAY EXAM OF WRIST: CPT

## 2021-02-02 PROCEDURE — 73130 X-RAY EXAM OF HAND: CPT

## 2021-02-02 PROCEDURE — 72170 X-RAY EXAM OF PELVIS: CPT

## 2021-02-10 ENCOUNTER — OFFICE VISIT (OUTPATIENT)
Dept: PRIMARY CARE CLINIC | Age: 61
End: 2021-02-10
Payer: COMMERCIAL

## 2021-02-10 VITALS
HEART RATE: 72 BPM | HEIGHT: 77 IN | OXYGEN SATURATION: 98 % | BODY MASS INDEX: 37.19 KG/M2 | WEIGHT: 315 LBS | DIASTOLIC BLOOD PRESSURE: 86 MMHG | SYSTOLIC BLOOD PRESSURE: 130 MMHG | TEMPERATURE: 97 F

## 2021-02-10 DIAGNOSIS — I10 ESSENTIAL HYPERTENSION: ICD-10-CM

## 2021-02-10 DIAGNOSIS — E66.01 MORBID OBESITY (HCC): ICD-10-CM

## 2021-02-10 DIAGNOSIS — E11.69 DIABETES MELLITUS TYPE 2 IN OBESE (HCC): Primary | ICD-10-CM

## 2021-02-10 DIAGNOSIS — R60.0 LOCALIZED EDEMA: ICD-10-CM

## 2021-02-10 DIAGNOSIS — E66.9 DIABETES MELLITUS TYPE 2 IN OBESE (HCC): Primary | ICD-10-CM

## 2021-02-10 PROCEDURE — 99213 OFFICE O/P EST LOW 20 MIN: CPT | Performed by: FAMILY MEDICINE

## 2021-02-10 RX ORDER — FUROSEMIDE 40 MG/1
60 TABLET ORAL DAILY
Qty: 135 TABLET | Refills: 3 | Status: SHIPPED | OUTPATIENT
Start: 2021-02-10 | End: 2021-03-10 | Stop reason: SDUPTHER

## 2021-02-10 RX ORDER — HYDROCHLOROTHIAZIDE 25 MG/1
TABLET ORAL
COMMUNITY
Start: 2020-12-30 | End: 2021-02-10

## 2021-02-10 RX ORDER — MELOXICAM 15 MG/1
TABLET ORAL
COMMUNITY
Start: 2021-01-27

## 2021-02-10 SDOH — ECONOMIC STABILITY: TRANSPORTATION INSECURITY
IN THE PAST 12 MONTHS, HAS LACK OF TRANSPORTATION KEPT YOU FROM MEETINGS, WORK, OR FROM GETTING THINGS NEEDED FOR DAILY LIVING?: NO

## 2021-02-10 ASSESSMENT — ENCOUNTER SYMPTOMS
RHINORRHEA: 0
DIARRHEA: 0
WHEEZING: 0
SORE THROAT: 0
ABDOMINAL PAIN: 0
EYE DISCHARGE: 0
COUGH: 0
SHORTNESS OF BREATH: 0
NAUSEA: 0
VOMITING: 0
EYE REDNESS: 0

## 2021-02-10 ASSESSMENT — PATIENT HEALTH QUESTIONNAIRE - PHQ9: SUM OF ALL RESPONSES TO PHQ QUESTIONS 1-9: 0

## 2021-02-10 NOTE — PROGRESS NOTES
[facility]  [address] [place] [id number]  Dept: [phone]    Fabián Rivero is a 57 y.o. male Established patient, who presents today for his medical conditions/complaintsas noted below. Chief Complaint   Patient presents with    Other     Follow up arthritis       HPI:     HPI  Patient states still having some swelling in the legs but improved. Has been using the Lasix 40 mg daily. Denies any leg cramps. Patient was evaluated by rheumatology. Their note was reviewed. Other blood work was added but not available. Was done at pathology laboratories. Patient denies any chest pain or shortness of breath. Patient states has recently retired. Has not actively been trying to lose weight. Patient not checking his blood pressure outside the office. Patient continues to complain of the joint discomfort mostly in the morning lasting 1 or 2 hours. Was changed to Mobic by rheumatology. Patient states blood sugars have been running somewhat higher. Patient states this is due to not being diet compliant lately.     Reviewed prior notes Rheumatology  Reviewed previous Labs    LDL Calculated (mg/dL)   Date Value   06/11/2020 101   04/27/2018 104   09/23/2016 103       (goal LDL is <100)   AST (U/L)   Date Value   12/10/2020 25     ALT (U/L)   Date Value   12/10/2020 45 (H)     BUN (mg/dL)   Date Value   12/10/2020 11     Hemoglobin A1C (%)   Date Value   12/10/2020 7.3     TSH (uIU/mL)   Date Value   06/11/2020 1.84     BP Readings from Last 3 Encounters:   02/10/21 130/86   12/11/20 132/87   12/10/20 136/82          (goal 120/80)    Past Medical History:   Diagnosis Date    Arthritis     Asthma     Cancer ([address] 2012    bladder    Depression     Diabetes mellitus ([facility]     borderline    [place] (hard of hearing)     bilateral hearing aids    Hypertension     Obesity     SOB (shortness of breath) on exertion     Umbilical hernia     Wears glasses       Past Surgical History:   Procedure Laterality Date    COLONOSCOPY  2017    normal    CYSTOSCOPY  14    several    CYSTOSCOPY  14    CYSTOSCOPY  14    CYSTOSCOPY N/A 2019    CYSTOSCOPY URETHRAL DILATATION performed by Dejuan Nava MD at [facility] 2020    CYSTOSCOPY URETHRAL DILATATION WITH FISH TEST AND URINE CULTURE performed by Dejuan Nava MD at [address] [place] [address] N/A 2020    CYSTOSCOPY AND RESECTION OF SMALL BLADDER TUMOR performed by Dejuan Nava MD at [facility] 2020    CYSTOSCOPY performed by Dejuan Nava MD at [facility] [address] [facility],4+E/Y,ADZDBM N/A 2018    HERNIA INCARCERATED UMBILICAL REPAIR W/MESH performed by Brenda Sebastian MD at [id number] [place] [address]        Family History   Problem Relation Age of Onset    Diabetes Father     Coronary Art Dis Father     Lung Cancer Father     Diabetes Mother     Hypertension Mother     Depression Mother     Cancer Brother         bladder       Social History     Tobacco Use    Smoking status: Former Smoker     Packs/day: 1.00     Years: 20.00     Pack years: 20.00     Types: Cigarettes     Start date: 1977     Quit date: 2001     Years since quittin.0    Smokeless tobacco: Former User     Quit date: 2004   Substance Use Topics    Alcohol use: Yes     Types: 1 Cans of beer per week     Comment: rare      Current Outpatient Medications   Medication Sig Dispense Refill    meloxicam (MOBIC) 15 MG tablet TAKE 1 TABLET BY MOUTH EVERY DAY AS NEEDED WITH FOOD      furosemide (LASIX) 40 MG tablet Take 1.5 tablets by mouth daily 135 tablet 3    metFORMIN (GLUCOPHAGE) 500 MG tablet Take 1 tablet by mouth daily (with breakfast) 90 tablet 1    potassium chloride (KLOR-CON M) 20 MEQ extended release tablet Take 1 tablet by mouth daily 90 tablet 3    tamsulosin (FLOMAX) 0.4 MG capsule Take 1 capsule by mouth daily 30 capsule 3  carvedilol (COREG) 25 MG tablet Take 1 tablet by mouth 2 times daily (with meals) 180 tablet 3    albuterol sulfate HFA (PROAIR HFA) 108 (90 Base) MCG/ACT inhaler Inhale 2 puffs into the lungs every 6 hours as needed for Wheezing or Shortness of Breath 1 Inhaler 5    aspirin 81 MG tablet Take 81 mg by mouth daily.  Multiple Vitamins-Minerals (MULTIVITAMIN & MINERAL PO) Take 1 tablet by mouth daily. No current facility-administered medications for this visit. No Known Allergies    Health Maintenance   Topic Date Due    HIV screen  02/02/1974    Shingles Vaccine (1 of 2) 02/02/2009    Diabetic retinal exam  06/17/2020    Diabetic foot exam  06/03/2021    Diabetic microalbuminuria test  06/03/2021    Lipid screen  06/11/2021    A1C test (Diabetic or Prediabetic)  12/10/2021    Potassium monitoring  12/10/2021    Creatinine monitoring  12/10/2021    Colon cancer screen colonoscopy  10/13/2026    DTaP/Tdap/Td vaccine (4 - Td) 05/07/2029    Flu vaccine  Completed    Pneumococcal 0-64 years Vaccine  Completed    Hepatitis C screen  Completed    Hepatitis A vaccine  Aged Out    Hib vaccine  Aged Out    Meningococcal (ACWY) vaccine  Aged Out       Subjective:      Review of Systems   Constitutional: Negative for chills and fever. HENT: Negative for rhinorrhea and sore throat. Eyes: Negative for discharge and redness. Respiratory: Negative for cough, shortness of breath and wheezing. Cardiovascular: Positive for leg swelling. Negative for chest pain and palpitations. Gastrointestinal: Negative for abdominal pain, diarrhea, nausea and vomiting. Genitourinary: Negative for dysuria and frequency. Musculoskeletal: Negative for arthralgias and myalgias. Neurological: Negative for dizziness, light-headedness and headaches. Psychiatric/Behavioral: Negative for sleep disturbance.        Objective:     /86   Pulse 72   Temp 97 °F (36.1 °C)   Ht 6' 5.04\" (1.957 m)   Wt (!) 394 lb (178.7 kg)   SpO2 98%   BMI 46.67 kg/m²   Physical Exam  Vitals signs and nursing note reviewed. Constitutional:       General: He is not in acute distress. Appearance: He is well-developed. He is not ill-appearing. HENT:      Head: Normocephalic and atraumatic. Right Ear: External ear normal.      Left Ear: External ear normal.   Eyes:      General: No scleral icterus. Right eye: No discharge. Left eye: No discharge. Conjunctiva/sclera: Conjunctivae normal.      Pupils: Pupils are equal, round, and reactive to light. Neck:      Thyroid: No thyromegaly. Trachea: No tracheal deviation. Cardiovascular:      Rate and Rhythm: Normal rate and regular rhythm. Heart sounds: Normal heart sounds. Pulmonary:      Effort: Pulmonary effort is normal. No respiratory distress. Breath sounds: Normal breath sounds. No wheezing. Musculoskeletal:      Right lower leg: Edema present. Left lower leg: Edema present. Comments: 1+ pitting edema   Lymphadenopathy:      Cervical: No cervical adenopathy. Skin:     General: Skin is warm. Findings: No rash. Neurological:      Mental Status: He is alert and oriented to person, place, and time. Psychiatric:         Mood and Affect: Mood normal.         Behavior: Behavior normal.         Thought Content: Thought content normal.         Assessment:       Diagnosis Orders   1. Diabetes mellitus type 2 in obese (HCC)  furosemide (LASIX) 40 MG tablet    Basic Metabolic Panel   2. Morbid obesity ([address]     3. Localized edema     4. Essential hypertension          Plan:    Increase Lasix to 60mg daily  Labs in two weeks   We will get copy of blood work from pathology laboratories. Keep normal scheduled follow-up appointment. Return if symptoms worsen or fail to improve.     Orders Placed This Encounter   Procedures    Basic Metabolic Panel     Standing Status:   Future     Standing Expiration Date:   2/10/2022 Orders Placed This Encounter   Medications    furosemide (LASIX) 40 MG tablet     Sig: Take 1.5 tablets by mouth daily     Dispense:  135 tablet     Refill:  3       Patient given educationalmaterials - see patient instructions. Discussed use, benefit, and side effectsof prescribed medications. All patient questions answered. Pt voiced understanding. Reviewed health maintenance. Instructed to continue current medications, diet andexercise. Patient agreed with treatment plan. Follow up as directed.      Electronicallysigned by Mai Sommer MD on 2/10/2021 at 1:25 PM

## 2021-03-10 DIAGNOSIS — E66.9 DIABETES MELLITUS TYPE 2 IN OBESE (HCC): ICD-10-CM

## 2021-03-10 DIAGNOSIS — E11.69 DIABETES MELLITUS TYPE 2 IN OBESE (HCC): ICD-10-CM

## 2021-03-10 RX ORDER — FUROSEMIDE 40 MG/1
60 TABLET ORAL DAILY
Qty: 135 TABLET | Refills: 3 | Status: SHIPPED | OUTPATIENT
Start: 2021-03-10 | End: 2021-04-01

## 2021-04-01 ENCOUNTER — OFFICE VISIT (OUTPATIENT)
Dept: PRIMARY CARE CLINIC | Age: 61
End: 2021-04-01
Payer: COMMERCIAL

## 2021-04-01 VITALS
DIASTOLIC BLOOD PRESSURE: 76 MMHG | WEIGHT: 315 LBS | BODY MASS INDEX: 37.19 KG/M2 | HEART RATE: 80 BPM | OXYGEN SATURATION: 98 % | TEMPERATURE: 97 F | HEIGHT: 77 IN | SYSTOLIC BLOOD PRESSURE: 130 MMHG

## 2021-04-01 DIAGNOSIS — E11.69 DIABETES MELLITUS TYPE 2 IN OBESE (HCC): ICD-10-CM

## 2021-04-01 DIAGNOSIS — R60.0 LOCALIZED EDEMA: Primary | ICD-10-CM

## 2021-04-01 DIAGNOSIS — E66.9 DIABETES MELLITUS TYPE 2 IN OBESE (HCC): ICD-10-CM

## 2021-04-01 DIAGNOSIS — L03.116 CELLULITIS OF LEG, LEFT: ICD-10-CM

## 2021-04-01 PROBLEM — M00.9 SEPTIC JOINT OF LEFT KNEE JOINT (HCC): Status: RESOLVED | Noted: 2020-11-21 | Resolved: 2021-04-01

## 2021-04-01 LAB — HBA1C MFR BLD: 7.3 %

## 2021-04-01 PROCEDURE — 3051F HG A1C>EQUAL 7.0%<8.0%: CPT | Performed by: FAMILY MEDICINE

## 2021-04-01 PROCEDURE — 99213 OFFICE O/P EST LOW 20 MIN: CPT | Performed by: FAMILY MEDICINE

## 2021-04-01 PROCEDURE — 83036 HEMOGLOBIN GLYCOSYLATED A1C: CPT | Performed by: FAMILY MEDICINE

## 2021-04-01 RX ORDER — FUROSEMIDE 40 MG/1
60 TABLET ORAL 2 TIMES DAILY
Qty: 180 TABLET | Refills: 3 | Status: SHIPPED | OUTPATIENT
Start: 2021-04-01 | End: 2022-04-11 | Stop reason: SDUPTHER

## 2021-04-01 RX ORDER — DOXYCYCLINE HYCLATE 100 MG
100 TABLET ORAL 2 TIMES DAILY
Qty: 28 TABLET | Refills: 0 | Status: SHIPPED | OUTPATIENT
Start: 2021-04-01 | End: 2021-04-15

## 2021-04-01 ASSESSMENT — ENCOUNTER SYMPTOMS
DIARRHEA: 0
NAUSEA: 0
SHORTNESS OF BREATH: 0
SORE THROAT: 0
EYE REDNESS: 0
ABDOMINAL PAIN: 0
WHEEZING: 0
COUGH: 0
VOMITING: 0
EYE DISCHARGE: 0
RHINORRHEA: 0

## 2021-04-01 ASSESSMENT — PATIENT HEALTH QUESTIONNAIRE - PHQ9
2. FEELING DOWN, DEPRESSED OR HOPELESS: 0
SUM OF ALL RESPONSES TO PHQ QUESTIONS 1-9: 0
SUM OF ALL RESPONSES TO PHQ QUESTIONS 1-9: 0
SUM OF ALL RESPONSES TO PHQ9 QUESTIONS 1 & 2: 0

## 2021-04-01 NOTE — PROGRESS NOTES
[facility]  [address] [place] [address]  Dept: [phone]    Fabián Yojana Carter is a 57 y.o. male Established patient, who presents today for his medical conditions/complaints as noted below. Chief Complaint   Patient presents with    Diabetes       HPI:     HPI  Patient here for diabetic follow-up. States blood sugars have been fairly well controlled. Lasix was increased at last visit to 60 mg daily. States his swelling has improved. Patient bumped his left leg on a hitch of a tractor approximately 2 weeks ago now having redness. Denies any calf pain. No low blood sugar reactions. No fevers or chills. Patient currently seeing rheumatology. Differential diagnosis is currently between diffuse connective tissue disease versus polymyalgia rheumatica. Patient does not yet have a follow-up appointment. No melena or hematochezia.     Reviewed prior notes None  Reviewed previous Labs    LDL Calculated (mg/dL)   Date Value   06/11/2020 101   04/27/2018 104   09/23/2016 103       (goal LDL is <100)   AST (U/L)   Date Value   12/10/2020 25     ALT (U/L)   Date Value   12/10/2020 45 (H)     BUN (mg/dL)   Date Value   12/10/2020 11     Hemoglobin A1C (%)   Date Value   04/01/2021 7.3     TSH (uIU/mL)   Date Value   06/11/2020 1.84     BP Readings from Last 3 Encounters:   04/01/21 130/76   02/10/21 130/86   12/11/20 132/87          (goal 120/80)    Past Medical History:   Diagnosis Date    Arthritis     Asthma     Cancer ([address] 2012    bladder    Depression     Diabetes mellitus ([address]     borderline    [place] (hard of hearing)     bilateral hearing aids    Hypertension     Obesity     SOB (shortness of breath) on exertion     Umbilical hernia     Wears glasses       Past Surgical History:   Procedure Laterality Date    COLONOSCOPY  2017    normal    CYSTOSCOPY  5/21/14    several    CYSTOSCOPY  8/21/14    CYSTOSCOPY  11-21-14    CYSTOSCOPY N/A 2019    CYSTOSCOPY URETHRAL DILATATION performed by Chandrika Clark MD at [facility] 2020    CYSTOSCOPY URETHRAL DILATATION WITH FISH TEST AND URINE CULTURE performed by Chandrika Clark MD at [address] [place] [address]  N/A 2020    CYSTOSCOPY AND RESECTION OF SMALL BLADDER TUMOR performed by Chandrika Clark MD at [address] [place] [address]  N/LEESA 2020    CYSTOSCOPY performed by Chandrika Clark MD at [facility] [address] [place],4+V/R,XZIARH N/A 2018    HERNIA INCARCERATED UMBILICAL REPAIR W/MESH performed by Andres Nolasco MD at [address]        Family History   Problem Relation Age of Onset    Diabetes Father     Coronary Art Dis Father     Lung Cancer Father     Diabetes Mother     Hypertension Mother     Depression Mother     Cancer Brother         bladder       Social History     Tobacco Use    Smoking status: Former Smoker     Packs/day: 1.00     Years: 20.00     Pack years: 20.00     Types: Cigarettes     Start date: 1977     Quit date: 2001     Years since quittin.1    Smokeless tobacco: Former User     Quit date: 2004   Substance Use Topics    Alcohol use: Yes     Types: 1 Cans of beer per week     Comment: rare      Current Outpatient Medications   Medication Sig Dispense Refill    furosemide (LASIX) 40 MG tablet Take 1.5 tablets by mouth 2 times daily 180 tablet 3    doxycycline hyclate (VIBRA-TABS) 100 MG tablet Take 1 tablet by mouth 2 times daily for 14 days 28 tablet 0    meloxicam (MOBIC) 15 MG tablet TAKE 1 TABLET BY MOUTH EVERY DAY AS NEEDED WITH FOOD      metFORMIN (GLUCOPHAGE) 500 MG tablet Take 1 tablet by mouth daily (with breakfast) 90 tablet 1    potassium chloride (KLOR-CON M) 20 MEQ extended release tablet Take 1 tablet by mouth daily 90 tablet 3    tamsulosin (FLOMAX) 0.4 MG capsule Take 1 capsule by mouth daily 30 capsule 3    carvedilol (COREG) 25 MG tablet Take 1 tablet by mouth 2 times daily (with meals) 180 tablet 3    albuterol sulfate HFA (PROAIR HFA) 108 (90 Base) MCG/ACT inhaler Inhale 2 puffs into the lungs every 6 hours as needed for Wheezing or Shortness of Breath 1 Inhaler 5    aspirin 81 MG tablet Take 81 mg by mouth daily.  Multiple Vitamins-Minerals (MULTIVITAMIN & MINERAL PO) Take 1 tablet by mouth daily. No current facility-administered medications for this visit. No Known Allergies    Health Maintenance   Topic Date Due    HIV screen  Never done    Shingles Vaccine (1 of 2) Never done    Diabetic retinal exam  06/17/2020    Diabetic foot exam  06/03/2021    Diabetic microalbuminuria test  06/03/2021    Lipid screen  06/11/2021    Potassium monitoring  12/10/2021    Creatinine monitoring  12/10/2021    A1C test (Diabetic or Prediabetic)  04/01/2022    Colon cancer screen colonoscopy  10/13/2026    DTaP/Tdap/Td vaccine (4 - Td) 05/07/2029    Flu vaccine  Completed    Pneumococcal 0-64 years Vaccine  Completed    COVID-19 Vaccine  Completed    Hepatitis C screen  Completed    Hepatitis A vaccine  Aged Out    Hib vaccine  Aged Out    Meningococcal (ACWY) vaccine  Aged Out       Subjective:      Review of Systems   Constitutional: Negative for chills and fever. HENT: Negative for rhinorrhea and sore throat. Eyes: Negative for discharge and redness. Respiratory: Negative for cough, shortness of breath and wheezing. Cardiovascular: Negative for chest pain and palpitations. Gastrointestinal: Negative for abdominal pain, diarrhea, nausea and vomiting. Genitourinary: Negative for dysuria and frequency. Musculoskeletal: Negative for arthralgias and myalgias. Neurological: Negative for dizziness, light-headedness and headaches. Psychiatric/Behavioral: Negative for sleep disturbance.        Objective:     /76   Pulse 80   Temp 97 °F (36.1 °C)   Ht 6' 5.04\" (1.957 m)   Wt (!) 394 lb 3.2 oz (178.8 kg)   SpO2 98%   BMI 46.70 kg/m²   Physical Exam  Musculoskeletal:      Right lower leg: Edema present. Left lower leg: Edema present. Comments: 1+ edema both lower extremities   Skin:     General: Skin is warm. Comments: Erythema noted to a abrasion to the left anterior shin. Some surrounding erythema noted to the tissue as well. Assessment:       Diagnosis Orders   1. Localized edema  Basic Metabolic Panel   2. Diabetes mellitus type 2 in obese (HCC)  furosemide (LASIX) 40 MG tablet    AL COLLECTION CAPILLARY BLOOD SPECIMEN    POCT glycosylated hemoglobin (Hb A1C)   3. Cellulitis of leg, left  doxycycline hyclate (VIBRA-TABS) 100 MG tablet        Plan:    Blood work to be done in 2 weeks. Increase Lasix to 40 mg twice daily. Doxycycline for 2 weeks. Patient to try harder with diet and exercise. Keep follow-up appointment with rheumatology. Changed to Telfa pad and Coban wrap to avoid adhesive to the skin. Does appear to be causing irritation. Return in about 3 months (around 7/1/2021). Orders Placed This Encounter   Procedures    Basic Metabolic Panel     Standing Status:   Future     Standing Expiration Date:   4/1/2022    POCT glycosylated hemoglobin (Hb A1C)    AL COLLECTION CAPILLARY BLOOD SPECIMEN     Orders Placed This Encounter   Medications    furosemide (LASIX) 40 MG tablet     Sig: Take 1.5 tablets by mouth 2 times daily     Dispense:  180 tablet     Refill:  3    doxycycline hyclate (VIBRA-TABS) 100 MG tablet     Sig: Take 1 tablet by mouth 2 times daily for 14 days     Dispense:  28 tablet     Refill:  0       Patient given educational materials - see patient instructions. Discussed use, benefit, and side effects of prescribed medications. All patient questions answered. Pt voiced understanding. Reviewed health maintenance. Instructed to continue current medications, diet andexercise. Patient agreed with treatment plan. Follow up as directed.      Electronicallysigned by Pretty Johnson MD on 4/1/2021 at 11:21 AM

## 2021-04-16 ENCOUNTER — PATIENT MESSAGE (OUTPATIENT)
Dept: PRIMARY CARE CLINIC | Age: 61
End: 2021-04-16

## 2021-04-19 ENCOUNTER — TELEPHONE (OUTPATIENT)
Dept: PRIMARY CARE CLINIC | Age: 61
End: 2021-04-19

## 2021-04-19 NOTE — TELEPHONE ENCOUNTER
Pt's spouse calling. States that pt needs a P. A. to get his BW done through [facility]. ? Call and let her know once approved. [phone] Pt will get the BW done here in our office.

## 2021-04-26 ENCOUNTER — HOSPITAL ENCOUNTER (OUTPATIENT)
Dept: LAB | Age: 61
Setting detail: SPECIMEN
Discharge: HOME | End: 2021-04-26
Payer: COMMERCIAL

## 2021-04-26 ENCOUNTER — PATIENT MESSAGE (OUTPATIENT)
Dept: PRIMARY CARE CLINIC | Age: 61
End: 2021-04-26

## 2021-04-26 DIAGNOSIS — Z01.818 PRE-OP TESTING: Primary | ICD-10-CM

## 2021-04-26 PROCEDURE — U0003 INFECTIOUS AGENT DETECTION BY NUCLEIC ACID (DNA OR RNA); SEVERE ACUTE RESPIRATORY SYNDROME CORONAVIRUS 2 (SARS-COV-2) (CORONAVIRUS DISEASE [COVID-19]), AMPLIFIED PROBE TECHNIQUE, MAKING USE OF HIGH THROUGHPUT TECHNOLOGIES AS DESCRIBED BY CMS-2020-01-R: HCPCS

## 2021-04-26 PROCEDURE — U0005 INFEC AGEN DETEC AMPLI PROBE: HCPCS

## 2021-04-27 LAB
SARS-COV-2: NORMAL
SARS-COV-2: NOT DETECTED
SOURCE: NORMAL

## 2021-04-28 ENCOUNTER — TELEPHONE (OUTPATIENT)
Dept: PRIMARY CARE CLINIC | Age: 61
End: 2021-04-28

## 2021-04-30 ENCOUNTER — HOSPITAL ENCOUNTER (OUTPATIENT)
Age: 61
Setting detail: OUTPATIENT SURGERY
Discharge: HOME | End: 2021-04-30
Attending: UROLOGY | Admitting: UROLOGY
Payer: COMMERCIAL

## 2021-04-30 VITALS
BODY MASS INDEX: 37.19 KG/M2 | HEIGHT: 77 IN | OXYGEN SATURATION: 98 % | TEMPERATURE: 97.8 F | RESPIRATION RATE: 18 BRPM | DIASTOLIC BLOOD PRESSURE: 78 MMHG | SYSTOLIC BLOOD PRESSURE: 154 MMHG | HEART RATE: 69 BPM | WEIGHT: 315 LBS

## 2021-04-30 LAB
BILIRUBIN URINE: NEGATIVE
COLOR: YELLOW
COMMENT UA: NORMAL
GLUCOSE BLD-MCNC: 168 MG/DL (ref 75–110)
GLUCOSE URINE: NEGATIVE
KETONES, URINE: NEGATIVE
LEUKOCYTE ESTERASE, URINE: NEGATIVE
NITRITE, URINE: NEGATIVE
PH UA: 5.5 (ref 5–8)
PROTEIN UA: NEGATIVE
SPECIFIC GRAVITY UA: 1.02 (ref 1–1.03)
TURBIDITY: CLEAR
URINE HGB: NEGATIVE
UROBILINOGEN, URINE: NORMAL

## 2021-04-30 PROCEDURE — 3600000002 HC SURGERY LEVEL 2 BASE: Performed by: UROLOGY

## 2021-04-30 PROCEDURE — 7100000000 HC PACU RECOVERY - FIRST 15 MIN: Performed by: UROLOGY

## 2021-04-30 PROCEDURE — 88120 CYTP URNE 3-5 PROBES EA SPEC: CPT

## 2021-04-30 PROCEDURE — 2709999900 HC NON-CHARGEABLE SUPPLY: Performed by: UROLOGY

## 2021-04-30 PROCEDURE — 82947 ASSAY GLUCOSE BLOOD QUANT: CPT

## 2021-04-30 PROCEDURE — 81003 URINALYSIS AUTO W/O SCOPE: CPT

## 2021-04-30 PROCEDURE — 3600000012 HC SURGERY LEVEL 2 ADDTL 15MIN: Performed by: UROLOGY

## 2021-04-30 PROCEDURE — 6370000000 HC RX 637 (ALT 250 FOR IP): Performed by: UROLOGY

## 2021-04-30 RX ORDER — LIDOCAINE HYDROCHLORIDE 20 MG/ML
JELLY TOPICAL PRN
Status: DISCONTINUED | OUTPATIENT
Start: 2021-04-30 | End: 2021-04-30 | Stop reason: ALTCHOICE

## 2021-04-30 RX ORDER — CEPHALEXIN 500 MG/1
500 CAPSULE ORAL 3 TIMES DAILY
Qty: 15 CAPSULE | Refills: 0 | Status: SHIPPED | OUTPATIENT
Start: 2021-04-30 | End: 2021-05-05

## 2021-04-30 NOTE — OP NOTE
Operative Note      Patient: Chitra Haji  YOB: 1960  MRN: [id number]    Date of Procedure: 4/30/2021    Pre-Op Diagnosis: BLADDER TUMOR CHECK    Post-Op Diagnosis: No evidence of bladder tumor recurrence       Procedure(s):  CYSTOSCOPY    Surgeon(s):  Celsa Montez MD    Assistant:   * No surgical staff found *    Anesthesia: Local    Estimated Blood Loss (mL): None    Complications: None    Specimens:   ID Type Source Tests Collected by Time Destination   1 : URINE ANALYSIS W/ CULTURE Urine Bladder UA W/REFLEX CULTURE Celsa Montez MD 4/30/2021 1518    2 : URINE FISH TEST Urine Bladder FISH, Piero Cordova MD 4/30/2021 3910        Implants:  * No implants in log *      Drains: * No LDAs found *    Findings: Indications: 79-year-old male with history of bladder cancer, the patient has been on surveillance cystoscopy, patient scheduled for local cystoscopy today    Detailed Description of Procedure:   Patient was brought to the operating room, positioned in supine, proper patient identification procedure identification prepping and draping in the usual sterile manner. We entered the bladder with the cystoscope,, flexible cystoscopy was used    Prostate enlargement documented, the interior of the bladder was examined, bladder trabeculations associated with bladder outlet obstruction. Ureteral orifices identified and effluxing clear urine, anterior and lateral bladder walls carefully examined no recurrent tumors identified, at the completion of the cystoscopy urine specimen was collected for urinalysis and culture bladder was emptied scope removed patient returned to recovery room in stable condition.     Impression no recurrent bladder tumor    Recommendation continue surveillance cystoscopies    Electronically signed by Celsa Montez MD on 4/30/2021 at 7:31 AM

## 2021-04-30 NOTE — H&P
HISTORY and [address]       NAME:  Chitra Haji  MRN: [id number]   YOB: 1960   Date: 4/30/2021   Age: 57 y.o. Gender: male       COMPLAINT AND PRESENT HISTORY:     Fabián Kirkland is 57 y.o., male, here for bladder tumor check with scheduled cystoscopy. History of bladder cancer initially diagnosed in 2009 treated at that time with BCG bladder instillation. He was in remission since until he was referred back to his urologist for screening cystoscopy. He did not have any symptoms at that time. No hematuria, dysuria, urgency at that time. Pt had repeated cystoscopy and pathology showed noninvasive urothelial carcinoma from the lateral wall of the bladder. Pt started on BCG bladder instillation in early October 2020 and completed the treatments on November 13, 2020. Pt reports he did undergo BCG bladder instillation again. Currently, he denies dysuria, hematuria. Pt reports urinary frequency, urgency but he attributes to taking Lasix daily. Pt reports nocturia 1-2 times per night.     NPO. Took metformin, carvedilol this am with sip of water. Pt took low dose aspirin last yesterday. Did not check his BS this am. Pt has been experiencing vertigo last night. Reports vertigo is less this am. Denies chest pain/pressure, palpitations, SOB, N/V/D or constipation, recent URI, fever or chills.        PAST MEDICAL HISTORY     Past Medical History:   Diagnosis Date    Arthritis     Asthma     Cancer ([address] 2012    bladder    Depression     Diabetes mellitus ([facility]     borderline    [place] (hard of hearing)     bilateral hearing aids    Hypertension     Obesity     SOB (shortness of breath) on exertion     Umbilical hernia     Wears glasses        SURGICAL HISTORY       Past Surgical History:   Procedure Laterality Date    COLONOSCOPY  2017    normal    CYSTOSCOPY  5/21/14    several    CYSTOSCOPY  8/21/14    CYSTOSCOPY  11-21-14    CYSTOSCOPY N/A 8/27/2019    CYSTOSCOPY URETHRAL DILATATION performed by Trevin Garcia MD at [address] [place] [address] N/A 2020    CYSTOSCOPY URETHRAL DILATATION WITH FISH TEST AND URINE CULTURE performed by Trevin Garcia MD at [address] [place] [address] N/A 2020    CYSTOSCOPY AND RESECTION OF SMALL BLADDER TUMOR performed by Trevin Garcia MD at [facility] 2020    CYSTOSCOPY performed by Trevin Garcia MD at [facility] [address] FNYD,3+L/I,[id number] N/A 2018    HERNIA INCARCERATED UMBILICAL REPAIR W/MESH performed by Kenton Carlos MD at [facility] HISTORY       Family History   Problem Relation Age of Onset    Diabetes Father     Coronary Art Dis Father     Lung Cancer Father     Diabetes Mother     Hypertension Mother     Depression Mother     Cancer Brother         bladder       SOCIAL HISTORY       Social History     Socioeconomic History    Marital status:      Spouse name: Not on file    Number of children: Not on file    Years of education: Not on file    Highest education level: Not on file   Occupational History    Not on file   Social Needs    Financial resource strain: Somewhat hard    Food insecurity     Worry: Sometimes true     Inability: Never true    Transportation needs     Medical: No     Non-medical: No   Tobacco Use    Smoking status: Former Smoker     Packs/day: 1.00     Years: 20.00     Pack years: 20.00     Types: Cigarettes     Start date: 1977     Quit date: 2001     Years since quittin.2    Smokeless tobacco: Former User     Quit date: 2004   Substance and Sexual Activity    Alcohol use: Yes     Types: 1 Cans of beer per week     Comment: rare    Drug use: No    Sexual activity: Not on file   Lifestyle    Physical activity     Days per week: Not on file     Minutes per session: Not on file    Stress: Not on file   Relationships    Social connections     Talks on phone: Not on file     Gets together: Not on file Attends Restoration service: Not on file     Active member of club or organization: Not on file     Attends meetings of clubs or organizations: Not on file     Relationship status: Not on file    Intimate partner violence     Fear of current or ex partner: Not on file     Emotionally abused: Not on file     Physically abused: Not on file     Forced sexual activity: Not on file   Other Topics Concern    Not on file   Social History Narrative    Not on file        REVIEW OF SYSTEMS      No Known Allergies    No current facility-administered medications on file prior to encounter. Current Outpatient Medications on File Prior to Encounter   Medication Sig Dispense Refill    furosemide (LASIX) 40 MG tablet Take 1.5 tablets by mouth 2 times daily 180 tablet 3    meloxicam (MOBIC) 15 MG tablet TAKE 1 TABLET BY MOUTH EVERY DAY AS NEEDED WITH FOOD      metFORMIN (GLUCOPHAGE) 500 MG tablet Take 1 tablet by mouth daily (with breakfast) 90 tablet 1    potassium chloride (KLOR-CON M) 20 MEQ extended release tablet Take 1 tablet by mouth daily 90 tablet 3    tamsulosin (FLOMAX) 0.4 MG capsule Take 1 capsule by mouth daily 30 capsule 3    carvedilol (COREG) 25 MG tablet Take 1 tablet by mouth 2 times daily (with meals) 180 tablet 3    albuterol sulfate HFA (PROAIR HFA) 108 (90 Base) MCG/ACT inhaler Inhale 2 puffs into the lungs every 6 hours as needed for Wheezing or Shortness of Breath 1 Inhaler 5    aspirin 81 MG tablet Take 81 mg by mouth daily.  Multiple Vitamins-Minerals (MULTIVITAMIN & MINERAL PO) Take 1 tablet by mouth daily. Notation: Above medications are not currently reconciled at time of signing this H&P note, to be reconciled in pre-op per RN. Negative except for what is mentioned in the HPI. GENERAL PHYSICAL EXAM     Vitals: Review vitals per RN flowsheet. GENERAL APPEARANCE:   Ivan Azul is 57 y.o. male,nourished, conscious, alert.   Does not appear to be in distress or pain at this time. SKIN:  Warm, dry, no cyanosis or jaundice. HEAD:  Normocephalic, atraumatic. EYES:  Pupils equal, reactive to light. EARS:  No discharge, no marked hearing loss. NOSE:  No rhinorrhea, epistaxis or septal deformity. THROAT:  Not congested. No ulceration bleeding or discharge. NECK:  No stiffness, trachea central.  No palpable masses or L.N.                 CHEST:  Symmetrical and equal on expansion. HEART:  RRR S1 > S2. No audible murmurs or gallops. LUNGS:  Equal on expansion, normal breath sounds. No wheezing, rhonchi or rales. ABDOMEN:  Soft on palpation. No localized tenderness. No guarding or rigidity. .   LOCOMOTOR, BACK AND SPINE:  No tenderness or deformities. EXTREMITIES:  Symmetrical, no pretibial edema. No calf tenderness. No discoloration or ulcerations. NEUROLOGIC:  The patient is conscious, alert, oriented. No facial drooping. Speech clear. No apparent focal sensory or motor deficits.              PROVISIONAL DIAGNOSES / SURGERY:      BLADDER TUMOR CHECK    CYSTOSCOPY    Patient Active Problem List    Diagnosis Date Noted    Localized edema 06/03/2020    Diabetic polyneuropathy associated with type 2 diabetes mellitus ([address] 06/03/2020    Diabetes mellitus type 2 in obese ([address] 11/05/2018    Morbid obesity ([address] 04/26/2018    Essential hypertension 09/21/2016    Sensorineural hearing loss of both ears 09/21/2016    Bladder cancer ([facility] 05/21/2014           NKECHI Berry - CNP on 4/30/2021 at 5:40 AM

## 2021-05-11 LAB — UROTHELIAL CANCER DETECTION: NORMAL

## 2021-06-25 DIAGNOSIS — I10 ESSENTIAL HYPERTENSION: ICD-10-CM

## 2021-06-25 RX ORDER — CARVEDILOL 25 MG/1
25 TABLET ORAL 2 TIMES DAILY WITH MEALS
Qty: 180 TABLET | Refills: 3 | Status: SHIPPED | OUTPATIENT
Start: 2021-06-25 | End: 2022-06-21

## 2021-10-28 DIAGNOSIS — J20.9 ACUTE BRONCHITIS, UNSPECIFIED ORGANISM: ICD-10-CM

## 2021-10-28 RX ORDER — ALBUTEROL SULFATE 90 UG/1
2 AEROSOL, METERED RESPIRATORY (INHALATION) EVERY 6 HOURS PRN
Qty: 1 EACH | Refills: 5 | Status: SHIPPED | OUTPATIENT
Start: 2021-10-28

## 2021-10-29 ENCOUNTER — OFFICE VISIT (OUTPATIENT)
Dept: PRIMARY CARE CLINIC | Age: 61
End: 2021-10-29
Payer: COMMERCIAL

## 2021-10-29 VITALS
SYSTOLIC BLOOD PRESSURE: 134 MMHG | HEART RATE: 71 BPM | OXYGEN SATURATION: 96 % | HEIGHT: 77 IN | WEIGHT: 315 LBS | DIASTOLIC BLOOD PRESSURE: 82 MMHG | BODY MASS INDEX: 37.19 KG/M2

## 2021-10-29 DIAGNOSIS — E11.69 DIABETES MELLITUS TYPE 2 IN OBESE (HCC): Primary | ICD-10-CM

## 2021-10-29 DIAGNOSIS — E11.42 DIABETIC POLYNEUROPATHY ASSOCIATED WITH TYPE 2 DIABETES MELLITUS (HCC): ICD-10-CM

## 2021-10-29 DIAGNOSIS — Z13.220 ENCOUNTER FOR LIPID SCREENING FOR CARDIOVASCULAR DISEASE: ICD-10-CM

## 2021-10-29 DIAGNOSIS — Z12.5 SCREENING PSA (PROSTATE SPECIFIC ANTIGEN): ICD-10-CM

## 2021-10-29 DIAGNOSIS — Z00.00 ANNUAL PHYSICAL EXAM: ICD-10-CM

## 2021-10-29 DIAGNOSIS — Z23 NEED FOR VACCINATION: ICD-10-CM

## 2021-10-29 DIAGNOSIS — Z13.6 ENCOUNTER FOR LIPID SCREENING FOR CARDIOVASCULAR DISEASE: ICD-10-CM

## 2021-10-29 DIAGNOSIS — E66.9 DIABETES MELLITUS TYPE 2 IN OBESE (HCC): Primary | ICD-10-CM

## 2021-10-29 LAB — HBA1C MFR BLD: 8.6 %

## 2021-10-29 PROCEDURE — 83036 HEMOGLOBIN GLYCOSYLATED A1C: CPT | Performed by: FAMILY MEDICINE

## 2021-10-29 PROCEDURE — 3052F HG A1C>EQUAL 8.0%<EQUAL 9.0%: CPT | Performed by: FAMILY MEDICINE

## 2021-10-29 PROCEDURE — 90674 CCIIV4 VAC NO PRSV 0.5 ML IM: CPT | Performed by: FAMILY MEDICINE

## 2021-10-29 PROCEDURE — 99213 OFFICE O/P EST LOW 20 MIN: CPT | Performed by: FAMILY MEDICINE

## 2021-10-29 PROCEDURE — 90471 IMMUNIZATION ADMIN: CPT | Performed by: FAMILY MEDICINE

## 2021-10-29 ASSESSMENT — ENCOUNTER SYMPTOMS
VOMITING: 0
ABDOMINAL PAIN: 0
COUGH: 0
SORE THROAT: 0
RHINORRHEA: 0
DIARRHEA: 0
EYE DISCHARGE: 0
SHORTNESS OF BREATH: 0
WHEEZING: 0
NAUSEA: 0
EYE REDNESS: 0

## 2021-10-29 ASSESSMENT — PATIENT HEALTH QUESTIONNAIRE - PHQ9
SUM OF ALL RESPONSES TO PHQ QUESTIONS 1-9: 0
SUM OF ALL RESPONSES TO PHQ QUESTIONS 1-9: 0
1. LITTLE INTEREST OR PLEASURE IN DOING THINGS: 0
2. FEELING DOWN, DEPRESSED OR HOPELESS: 0
SUM OF ALL RESPONSES TO PHQ9 QUESTIONS 1 & 2: 0
SUM OF ALL RESPONSES TO PHQ QUESTIONS 1-9: 0

## 2021-10-29 NOTE — PROGRESS NOTES
[id number] [address] PRIMARY CARE  [address] [address] [place] [id number]  Dept: [phone]    Fabián Urban is a 57 y.o. male Established patient, who presents today for his medical conditions/complaints as noted below. Chief Complaint   Patient presents with    Diabetes       HPI:     HPI  Pt states both brothers have passed within the past 10 days. No chest apin ro sob. No light headed. Patient states blood sugars in the morning are running in the 200 range. Denies any low blood sugar reactions.        Reviewed prior notes None  Reviewed previous Labs    LDL Calculated (mg/dL)   Date Value   06/11/2020 101   04/27/2018 104   09/23/2016 103       (goal LDL is <100)   AST (U/L)   Date Value   12/10/2020 25     ALT (U/L)   Date Value   12/10/2020 45 (H)     BUN (mg/dL)   Date Value   10/04/2021 12     Hemoglobin A1C (%)   Date Value   10/29/2021 8.6     TSH (uIU/mL)   Date Value   06/11/2020 1.84     BP Readings from Last 3 Encounters:   10/29/21 134/82   04/30/21 (!) 154/78   04/01/21 130/76          (goal 120/80)    Past Medical History:   Diagnosis Date    Arthritis     Asthma     Cancer [address] [address] 2012    bladder    Depression     Diabetes mellitus ([facility] [address]     borderline    [place] (hard of hearing)     bilateral hearing aids    Hypertension     Obesity     SOB (shortness of breath) on exertion     Umbilical hernia     Wears glasses       Past Surgical History:   Procedure Laterality Date    COLONOSCOPY  2017    normal    CYSTOSCOPY  5/21/14    several    CYSTOSCOPY  8/21/14    CYSTOSCOPY  11-21-14    CYSTOSCOPY N/A 8/27/2019    CYSTOSCOPY URETHRAL DILATATION performed by Kalie García MD at [facility] [employer] 8/21/2020    CYSTOSCOPY URETHRAL DILATATION WITH FISH TEST AND URINE CULTURE performed by Kalie García MD at [facility] [employer] 9/8/2020    CYSTOSCOPY AND RESECTION OF SMALL BLADDER TUMOR performed by Kalie García MD at [address] [place] [address] CYSTOSCOPY N/A 2020    CYSTOSCOPY performed by Nikki Cain MD at [facility]. N/A 2021    CYSTOSCOPY performed by Nikki Cain MD at [address] [facility]      umbilical   [address] EWAT,8+F/U,[id number] N/A 2018    HERNIA INCARCERATED UMBILICAL REPAIR W/MESH performed by Denisha Beauchamp MD at [place] History   Problem Relation Age of Onset    Diabetes Father     Coronary Art Dis Father     Lung Cancer Father     Diabetes Mother     Hypertension Mother     Depression Mother     Cancer Brother         bladder       Social History     Tobacco Use    Smoking status: Former Smoker     Packs/day: 1.00     Years: 20.00     Pack years: 20.00     Types: Cigarettes     Start date: 1977     Quit date: 2001     Years since quittin.7    Smokeless tobacco: Former User     Quit date: 2004   Substance Use Topics    Alcohol use: Yes     Types: 1 Cans of beer per week     Comment: rare      Current Outpatient Medications   Medication Sig Dispense Refill    metFORMIN (GLUCOPHAGE) 500 MG tablet Take 1 tablet by mouth 2 times daily (with meals) 180 tablet 3    albuterol sulfate HFA (PROAIR HFA) 108 (90 Base) MCG/ACT inhaler Inhale 2 puffs into the lungs every 6 hours as needed for Wheezing or Shortness of Breath 1 each 5    carvedilol (COREG) 25 MG tablet Take 1 tablet by mouth 2 times daily (with meals) 180 tablet 3    furosemide (LASIX) 40 MG tablet Take 1.5 tablets by mouth 2 times daily 180 tablet 3    meloxicam (MOBIC) 15 MG tablet TAKE 1 TABLET BY MOUTH EVERY DAY AS NEEDED WITH FOOD      potassium chloride (KLOR-CON M) 20 MEQ extended release tablet Take 1 tablet by mouth daily 90 tablet 3    tamsulosin (FLOMAX) 0.4 MG capsule Take 1 capsule by mouth daily 30 capsule 3    aspirin 81 MG tablet Take 81 mg by mouth daily.  Multiple Vitamins-Minerals (MULTIVITAMIN & MINERAL PO) Take 1 tablet by mouth daily.        No current facility-administered medications for this visit. No Known Allergies    Health Maintenance   Topic Date Due    HIV screen  Never done    Shingles Vaccine (1 of 2) Never done    Diabetic retinal exam  06/17/2020    Diabetic microalbuminuria test  06/03/2021    Lipid screen  06/11/2021    COVID-19 Vaccine (3 - Pfizer booster) 08/19/2021    Flu vaccine (1) 09/01/2021    Potassium monitoring  10/04/2022    Creatinine monitoring  10/04/2022    Diabetic foot exam  10/29/2022    A1C test (Diabetic or Prediabetic)  10/29/2022    Pneumococcal 0-64 years Vaccine (2 of 2 - PPSV23) 02/02/2024    Colon cancer screen colonoscopy  10/13/2026    DTaP/Tdap/Td vaccine (4 - Td or Tdap) 05/07/2029    Hepatitis C screen  Completed    Hepatitis A vaccine  Aged Out    Hib vaccine  Aged Out    Meningococcal (ACWY) vaccine  Aged Out       Subjective:      Review of Systems   Constitutional: Negative for chills and fever. HENT: Negative for rhinorrhea and sore throat. Eyes: Negative for discharge and redness. Respiratory: Negative for cough, shortness of breath and wheezing. Cardiovascular: Negative for chest pain and palpitations. Gastrointestinal: Negative for abdominal pain, diarrhea, nausea and vomiting. Genitourinary: Negative for dysuria and frequency. Musculoskeletal: Negative for arthralgias and myalgias. Neurological: Negative for dizziness, light-headedness and headaches. Psychiatric/Behavioral: Negative for sleep disturbance. Objective:     /82   Pulse 71   Ht 6' 5.04\" (1.957 m)   Wt (!) 403 lb 3.2 oz (182.9 kg)   SpO2 96%   BMI 47.76 kg/m²   Physical Exam  Vitals and nursing note reviewed. Constitutional:       General: He is not in acute distress. Appearance: He is well-developed. HENT:      Head: Normocephalic and atraumatic. Right Ear: External ear normal.      Left Ear: External ear normal.   Eyes:      General:         Right eye: No discharge. Left eye: No discharge. Conjunctiva/sclera: Conjunctivae normal.      Pupils: Pupils are equal, round, and reactive to light. Neck:      Thyroid: No thyromegaly. Trachea: No tracheal deviation. Cardiovascular:      Rate and Rhythm: Normal rate and regular rhythm. Heart sounds: Normal heart sounds. Comments: No carotid bruits  Pulmonary:      Effort: Pulmonary effort is normal. No respiratory distress. Breath sounds: Normal breath sounds. No wheezing. Lymphadenopathy:      Cervical: No cervical adenopathy. Skin:     General: Skin is warm and dry. Findings: No rash. Neurological:      Mental Status: He is alert and oriented to person, place, and time. Comments: Diabetic foot check: Bunions: none . Hammertoes none. Plantar calluses mild. No cyanosis or clubbing. sensation intact on 3 of 6 test points with the 10 gram filament. Dorsalis pedis pulses intact bilaterally. Capillary refill at the toes was less than 2 seconds. No skin breakdown, erythema, blisters, scaling, or ulcers. No evidence of fungal infection. Psychiatric:         Behavior: Behavior normal.         Thought Content: Thought content normal.         Assessment:       Diagnosis Orders   1. Diabetes mellitus type 2 in obese (HCC)   DIABETES FOOT EXAM    Microalbumin, Ur    POCT glycosylated hemoglobin (Hb A1C)   2. Need for vaccination  INFLUENZA, MDCK QUADV, 2 YRS AND OLDER, IM, PF, PREFILL SYR OR SDV, 0.5ML (FLUCELVAX QUADV, PF)   3. Screening PSA (prostate specific antigen)  PSA Screening   4. Encounter for lipid screening for cardiovascular disease  Lipid, Fasting   5. Annual physical exam  Hepatic Function Panel   6. Diabetic polyneuropathy associated with type 2 diabetes mellitus ([facility] [address]          Plan:    Increase Metformin to twice daily. If next A1c is still elevated, we will try to add in Trulicity. Blood work ordered. Keep follow-up with rheumatology.   Flu shot today     Return in about 3 months (around 1/29/2022). Orders Placed This Encounter   Procedures    INFLUENZA, MDCK QUADV, 2 YRS AND OLDER, IM, PF, PREFILL SYR OR SDV, 0.5ML (FLUCELVAX QUADV, PF)    PSA Screening     Standing Status:   Future     Standing Expiration Date:   10/29/2022    Lipid, Fasting     Standing Status:   Future     Standing Expiration Date:   10/29/2022    Hepatic Function Panel     Standing Status:   Future     Standing Expiration Date:   10/29/2022    Microalbumin, Ur     Standing Status:   Future     Standing Expiration Date:   10/29/2022    POCT glycosylated hemoglobin (Hb A1C)    HM DIABETES FOOT EXAM     Orders Placed This Encounter   Medications    metFORMIN (GLUCOPHAGE) 500 MG tablet     Sig: Take 1 tablet by mouth 2 times daily (with meals)     Dispense:  180 tablet     Refill:  3       Patient given educational materials - see patient instructions. Discussed use, benefit, and side effects of prescribed medications. All patient questions answered. Pt voiced understanding. Reviewed health maintenance. Instructed to continue current medications, diet andexercise. Patient agreed with treatment plan. Follow up as directed.      Electronicallysigned by Myra Diane MD on 10/29/2021 at 10:49 AM

## 2021-11-01 ENCOUNTER — TELEPHONE (OUTPATIENT)
Dept: PRIMARY CARE CLINIC | Age: 61
End: 2021-11-01

## 2021-11-05 RX ORDER — TAMSULOSIN HYDROCHLORIDE 0.4 MG/1
0.4 CAPSULE ORAL DAILY
Qty: 30 CAPSULE | Refills: 3 | Status: SHIPPED | OUTPATIENT
Start: 2021-11-05 | End: 2022-10-27

## 2021-11-10 ENCOUNTER — IMMUNIZATION (OUTPATIENT)
Dept: PRIMARY CARE CLINIC | Age: 61
End: 2021-11-10
Payer: COMMERCIAL

## 2021-11-10 DIAGNOSIS — Z23 NEED FOR VACCINATION: Primary | ICD-10-CM

## 2021-11-10 PROCEDURE — 0004A COVID-19, PFIZER VACCINE 30MCG/0.3ML DOSE: CPT | Performed by: PHYSICIAN ASSISTANT

## 2021-11-10 PROCEDURE — 91300 COVID-19, PFIZER VACCINE 30MCG/0.3ML DOSE: CPT | Performed by: PHYSICIAN ASSISTANT

## 2021-11-10 NOTE — PROGRESS NOTES
After obtaining consent, and per orders of Lindsay Katz PA-C, injection of [employer] Vaccine given in Right deltoid by Tarsha Houston MA. Patient instructed to remain in clinic for 20 minutes afterwards, and to report any adverse reaction to me immediately. Patient tolerated the vaccine well , Patient's 3rd Covid vaccine.

## 2021-12-01 DIAGNOSIS — E66.9 DIABETES MELLITUS TYPE 2 IN OBESE (HCC): ICD-10-CM

## 2021-12-01 DIAGNOSIS — Z13.220 ENCOUNTER FOR LIPID SCREENING FOR CARDIOVASCULAR DISEASE: ICD-10-CM

## 2021-12-01 DIAGNOSIS — E11.69 DIABETES MELLITUS TYPE 2 IN OBESE (HCC): ICD-10-CM

## 2021-12-01 DIAGNOSIS — Z12.5 SCREENING PSA (PROSTATE SPECIFIC ANTIGEN): ICD-10-CM

## 2021-12-01 DIAGNOSIS — Z00.00 ANNUAL PHYSICAL EXAM: ICD-10-CM

## 2021-12-01 DIAGNOSIS — Z13.6 ENCOUNTER FOR LIPID SCREENING FOR CARDIOVASCULAR DISEASE: ICD-10-CM

## 2021-12-01 LAB
ALBUMIN SERPL-MCNC: 4.2 G/DL (ref 3.5–5.2)
ALBUMIN/GLOBULIN RATIO: 1.1 (ref 1–2.5)
ALP BLD-CCNC: 92 U/L (ref 40–129)
ALT SERPL-CCNC: 41 U/L (ref 5–41)
AST SERPL-CCNC: 27 U/L
BILIRUB SERPL-MCNC: 0.47 MG/DL (ref 0.3–1.2)
BILIRUBIN DIRECT: 0.11 MG/DL
BILIRUBIN, INDIRECT: 0.36 MG/DL (ref 0–1)
CHOLESTEROL, FASTING: 178 MG/DL
CHOLESTEROL/HDL RATIO: 4
CREATININE URINE: 217.6 MG/DL (ref 39–259)
GLOBULIN: NORMAL G/DL (ref 1.5–3.8)
HDLC SERPL-MCNC: 44 MG/DL
LDL CHOLESTEROL: 108 MG/DL (ref 0–130)
MICROALBUMIN/CREAT 24H UR: 46 MG/L
MICROALBUMIN/CREAT UR-RTO: 21 MCG/MG CREAT
PROSTATE SPECIFIC ANTIGEN: 0.65 UG/L
TOTAL PROTEIN: 7.9 G/DL (ref 6.4–8.3)
TRIGLYCERIDE, FASTING: 132 MG/DL
VLDLC SERPL CALC-MCNC: NORMAL MG/DL (ref 1–30)

## 2021-12-22 RX ORDER — POTASSIUM CHLORIDE 20 MEQ/1
TABLET, EXTENDED RELEASE ORAL
Qty: 240 TABLET | Refills: 0 | Status: SHIPPED | OUTPATIENT
Start: 2021-12-22 | End: 2022-08-10

## 2022-04-08 DIAGNOSIS — E11.69 DIABETES MELLITUS TYPE 2 IN OBESE (HCC): ICD-10-CM

## 2022-04-08 DIAGNOSIS — E66.9 DIABETES MELLITUS TYPE 2 IN OBESE (HCC): ICD-10-CM

## 2022-04-11 RX ORDER — FUROSEMIDE 40 MG/1
60 TABLET ORAL 2 TIMES DAILY
Qty: 180 TABLET | Refills: 3 | Status: SHIPPED | OUTPATIENT
Start: 2022-04-11

## 2022-04-29 ENCOUNTER — OFFICE VISIT (OUTPATIENT)
Dept: PRIMARY CARE CLINIC | Age: 62
End: 2022-04-29
Payer: COMMERCIAL

## 2022-04-29 VITALS
OXYGEN SATURATION: 94 % | HEIGHT: 77 IN | HEART RATE: 84 BPM | BODY MASS INDEX: 37.19 KG/M2 | DIASTOLIC BLOOD PRESSURE: 84 MMHG | SYSTOLIC BLOOD PRESSURE: 136 MMHG | WEIGHT: 315 LBS

## 2022-04-29 DIAGNOSIS — E11.69 DIABETES MELLITUS TYPE 2 IN OBESE (HCC): ICD-10-CM

## 2022-04-29 DIAGNOSIS — I10 ESSENTIAL HYPERTENSION: ICD-10-CM

## 2022-04-29 DIAGNOSIS — Z23 NEED FOR VIRAL IMMUNIZATION: ICD-10-CM

## 2022-04-29 DIAGNOSIS — E66.01 MORBID OBESITY (HCC): ICD-10-CM

## 2022-04-29 DIAGNOSIS — E66.9 DIABETES MELLITUS TYPE 2 IN OBESE (HCC): ICD-10-CM

## 2022-04-29 DIAGNOSIS — E11.69 DIABETES MELLITUS TYPE 2 IN OBESE (HCC): Primary | ICD-10-CM

## 2022-04-29 DIAGNOSIS — E66.9 DIABETES MELLITUS TYPE 2 IN OBESE (HCC): Primary | ICD-10-CM

## 2022-04-29 LAB
ANION GAP SERPL CALCULATED.3IONS-SCNC: 13 MMOL/L (ref 9–17)
BUN BLDV-MCNC: 14 MG/DL (ref 8–23)
BUN/CREAT BLD: ABNORMAL (ref 9–20)
CALCIUM SERPL-MCNC: 9.3 MG/DL (ref 8.6–10.4)
CHLORIDE BLD-SCNC: 100 MMOL/L (ref 98–107)
CO2: 26 MMOL/L (ref 20–31)
CREAT SERPL-MCNC: 0.91 MG/DL (ref 0.7–1.2)
GFR AFRICAN AMERICAN: >60 ML/MIN
GFR NON-AFRICAN AMERICAN: >60 ML/MIN
GFR SERPL CREATININE-BSD FRML MDRD: ABNORMAL ML/MIN/{1.73_M2}
GFR SERPL CREATININE-BSD FRML MDRD: ABNORMAL ML/MIN/{1.73_M2}
GLUCOSE BLD-MCNC: 320 MG/DL (ref 70–99)
POTASSIUM SERPL-SCNC: 4.4 MMOL/L (ref 3.7–5.3)
SODIUM BLD-SCNC: 139 MMOL/L (ref 135–144)

## 2022-04-29 PROCEDURE — 99214 OFFICE O/P EST MOD 30 MIN: CPT | Performed by: FAMILY MEDICINE

## 2022-04-29 PROCEDURE — 90750 HZV VACC RECOMBINANT IM: CPT | Performed by: FAMILY MEDICINE

## 2022-04-29 PROCEDURE — 90471 IMMUNIZATION ADMIN: CPT | Performed by: FAMILY MEDICINE

## 2022-04-29 RX ORDER — DULAGLUTIDE 0.75 MG/.5ML
0.75 INJECTION, SOLUTION SUBCUTANEOUS WEEKLY
Qty: 4 PEN | Refills: 5 | Status: SHIPPED | OUTPATIENT
Start: 2022-04-29 | End: 2022-07-29

## 2022-04-29 RX ORDER — ATORVASTATIN CALCIUM 20 MG/1
20 TABLET, FILM COATED ORAL NIGHTLY
Qty: 90 TABLET | Refills: 3 | Status: SHIPPED | OUTPATIENT
Start: 2022-04-29 | End: 2022-09-20

## 2022-04-29 SDOH — ECONOMIC STABILITY: FOOD INSECURITY: WITHIN THE PAST 12 MONTHS, YOU WORRIED THAT YOUR FOOD WOULD RUN OUT BEFORE YOU GOT MONEY TO BUY MORE.: NEVER TRUE

## 2022-04-29 SDOH — ECONOMIC STABILITY: FOOD INSECURITY: WITHIN THE PAST 12 MONTHS, THE FOOD YOU BOUGHT JUST DIDN'T LAST AND YOU DIDN'T HAVE MONEY TO GET MORE.: NEVER TRUE

## 2022-04-29 ASSESSMENT — ENCOUNTER SYMPTOMS
EYE DISCHARGE: 0
NAUSEA: 0
WHEEZING: 0
SHORTNESS OF BREATH: 0
SORE THROAT: 0
COUGH: 0
VOMITING: 0
DIARRHEA: 0
ABDOMINAL PAIN: 0
RHINORRHEA: 0
EYE REDNESS: 0

## 2022-04-29 ASSESSMENT — SOCIAL DETERMINANTS OF HEALTH (SDOH): HOW HARD IS IT FOR YOU TO PAY FOR THE VERY BASICS LIKE FOOD, HOUSING, MEDICAL CARE, AND HEATING?: NOT HARD AT ALL

## 2022-04-29 ASSESSMENT — PATIENT HEALTH QUESTIONNAIRE - PHQ9
SUM OF ALL RESPONSES TO PHQ QUESTIONS 1-9: 0
SUM OF ALL RESPONSES TO PHQ9 QUESTIONS 1 & 2: 0
2. FEELING DOWN, DEPRESSED OR HOPELESS: 0
1. LITTLE INTEREST OR PLEASURE IN DOING THINGS: 0

## 2022-04-29 NOTE — PROGRESS NOTES
[id number] [address] PRIMARY CARE  [address] [address] [place] [address]  Dept: [phone]    Fabián Rocha is a 60 y.o. male Established patient, who presents today for his medical conditions/complaints as noted below. Chief Complaint   Patient presents with    Diabetes     Would like to discuss high sugar readings       HPI:     HPI  Patient here for diabetic recheck. Has not been following a diabetic diet. States blood sugars have been running in the 200 up to almost 300. Denies any low blood sugar reactions. Patient has not checking his blood pressure outside the office. Denies any chest heaviness or shortness of breath. Patient states would like to lose weight but has been having trouble. Has been having issues with diet. Does not exercise on a regular basis.     Reviewed prior notes None  Reviewed previous Labs    LDL Cholesterol (mg/dL)   Date Value   12/01/2021 108     LDL Calculated (mg/dL)   Date Value   06/11/2020 101   04/27/2018 104   09/23/2016 103       (goal LDL is <100)   AST (U/L)   Date Value   12/01/2021 27     ALT (U/L)   Date Value   12/01/2021 41     BUN (mg/dL)   Date Value   10/04/2021 12     Hemoglobin A1C (%)   Date Value   10/29/2021 8.6     TSH (uIU/mL)   Date Value   06/11/2020 1.84     BP Readings from Last 3 Encounters:   04/29/22 136/84   10/29/21 134/82   04/30/21 (!) 154/78          (goal 120/80)    Past Medical History:   Diagnosis Date    Arthritis     Asthma     Cancer ([address] 2012    bladder    Depression     Diabetes mellitus ([facility] [address]     borderline    [place] (hard of hearing)     bilateral hearing aids    Hypertension     Obesity     SOB (shortness of breath) on exertion     Umbilical hernia     Wears glasses       Past Surgical History:   Procedure Laterality Date    COLONOSCOPY  2017    normal    CYSTOSCOPY  5/21/14    several    CYSTOSCOPY  8/21/14    CYSTOSCOPY  11-21-14    CYSTOSCOPY N/A 8/27/2019    CYSTOSCOPY URETHRAL DILATATION performed by Kishan Marsh MD at [facility] 2020    CYSTOSCOPY URETHRAL DILATATION WITH FISH TEST AND URINE CULTURE performed by Kishan Marsh MD at [address] [place] N/A 2020    CYSTOSCOPY AND RESECTION OF SMALL BLADDER TUMOR performed by Kishan Marsh MD at [facility] 2020    CYSTOSCOPY performed by Kishan Marsh MD at [facility] 2021    CYSTOSCOPY performed by Kishan Marsh MD at [address]      umbilical   [address] OKVP,9+D/Y,ZOBHSO N/A 2018    HERNIA INCARCERATED UMBILICAL REPAIR W/MESH performed by Armando Nunez MD at [address]       Family History   Problem Relation Age of Onset    Diabetes Father     Coronary Art Dis Father     Lung Cancer Father     Diabetes Mother     Hypertension Mother     Depression Mother     Cancer Brother         bladder       Social History     Tobacco Use    Smoking status: Former Smoker     Packs/day: 1.00     Years: 20.00     Pack years: 20.00     Types: Cigarettes     Start date: 1977     Quit date: 2001     Years since quittin.2    Smokeless tobacco: Former User     Quit date: 2004   Substance Use Topics    Alcohol use: Yes     Types: 1 Cans of beer per week     Comment: rare      Current Outpatient Medications   Medication Sig Dispense Refill    Dulaglutide (TRULICITY) 2.86 RQ/6.0NA SOPN Inject 0.75 mg into the skin once a week 4 pen 5    atorvastatin (LIPITOR) 20 MG tablet Take 1 tablet by mouth nightly 90 tablet 3    metFORMIN (GLUCOPHAGE) 500 MG tablet Take 1 tablet by mouth 3 times daily (before meals) 270 tablet 3    furosemide (LASIX) 40 MG tablet Take 1.5 tablets by mouth 2 times daily 180 tablet 3    potassium chloride (KLOR-CON M) 20 MEQ extended release tablet take 1 tablet by mouth once daily 240 tablet 0    tamsulosin (FLOMAX) 0.4 MG capsule Take 1 capsule by mouth daily 30 capsule 3    albuterol sulfate HFA (PROAIR HFA) 108 (90 Base) MCG/ACT inhaler Inhale 2 puffs into the lungs every 6 hours as needed for Wheezing or Shortness of Breath 1 each 5    carvedilol (COREG) 25 MG tablet Take 1 tablet by mouth 2 times daily (with meals) 180 tablet 3    meloxicam (MOBIC) 15 MG tablet TAKE 1 TABLET BY MOUTH EVERY DAY AS NEEDED WITH FOOD      aspirin 81 MG tablet Take 81 mg by mouth daily.  Multiple Vitamins-Minerals (MULTIVITAMIN & MINERAL PO) Take 1 tablet by mouth daily. No current facility-administered medications for this visit. No Known Allergies    Health Maintenance   Topic Date Due    HIV screen  Never done    Shingles vaccine (1 of 2) Never done    Diabetic retinal exam  06/17/2020    Potassium  10/04/2022    Creatinine  10/04/2022    Diabetic foot exam  10/29/2022    A1C test (Diabetic or Prediabetic)  10/29/2022    Diabetic microalbuminuria test  12/01/2022    Lipids  12/01/2022    Depression Screen  04/29/2023    Pneumococcal 0-64 years Vaccine (3 - PPSV23 or PCV20) 02/02/2024    Colorectal Cancer Screen  10/13/2026    DTaP/Tdap/Td vaccine (4 - Td or Tdap) 05/07/2029    Flu vaccine  Completed    COVID-19 Vaccine  Completed    Hepatitis C screen  Completed    Hepatitis A vaccine  Aged Out    Hib vaccine  Aged Out    Meningococcal (ACWY) vaccine  Aged Out       Subjective:      Review of Systems   Constitutional: Negative for chills and fever. HENT: Negative for rhinorrhea and sore throat. Eyes: Negative for discharge and redness. Respiratory: Negative for cough, shortness of breath and wheezing. Cardiovascular: Negative for chest pain and palpitations. Gastrointestinal: Negative for abdominal pain, diarrhea, nausea and vomiting. Genitourinary: Negative for dysuria and frequency. Musculoskeletal: Negative for arthralgias and myalgias. Neurological: Negative for dizziness, light-headedness and headaches.    Psychiatric/Behavioral: Negative for sleep disturbance. Objective:     /84   Pulse 84   Ht 6' 5.04\" (1.957 m)   Wt (!) 394 lb 4.8 oz (178.9 kg)   SpO2 94%   BMI 46.71 kg/m²   Physical Exam  Vitals and nursing note reviewed. Constitutional:       General: He is not in acute distress. Appearance: He is well-developed. He is not ill-appearing. HENT:      Head: Normocephalic and atraumatic. Right Ear: External ear normal.      Left Ear: External ear normal.   Eyes:      General: No scleral icterus. Right eye: No discharge. Left eye: No discharge. Conjunctiva/sclera: Conjunctivae normal.   Neck:      Thyroid: No thyromegaly. Trachea: No tracheal deviation. Cardiovascular:      Rate and Rhythm: Normal rate and regular rhythm. Heart sounds: Normal heart sounds. Pulmonary:      Effort: Pulmonary effort is normal. No respiratory distress. Breath sounds: Normal breath sounds. No wheezing. Lymphadenopathy:      Cervical: No cervical adenopathy. Skin:     General: Skin is warm. Findings: No rash. Neurological:      Mental Status: He is alert and oriented to person, place, and time. Psychiatric:         Mood and Affect: Mood normal.         Behavior: Behavior normal.         Thought Content: Thought content normal.         Assessment:       Diagnosis Orders   1. Diabetes mellitus type 2 in obese (HCC)  Dulaglutide (TRULICITY) 1.85 DL/4.8LP SOPN    atorvastatin (LIPITOR) 20 MG tablet    Hemoglobin I8U    Basic Metabolic Panel    Hemoglobin A1C   2. Essential hypertension     3. Need for viral immunization  Zoster Subunit ([address] [place] [address] [address] [place])   4. Morbid obesity ([facility] [address]          Plan:    Increase metformin to 3 a day  Add Trulicity 5.79 weekly  O8Q and BMP now  Repeat A1c in 3 months. May need to titrate Trulicity  Shingrix vaccine today  Patient monitor blood pressure outside of office  Start Lipitor for cardiovascular protection    Return in about 3 months (around 7/29/2022).     Orders Placed This Encounter   Procedures    Zoster Subunit (SHINGRIX)    Hemoglobin A1C     Standing Status:   Future     Standing Expiration Date:   4/29/2023    Basic Metabolic Panel     Standing Status:   Future     Standing Expiration Date:   4/29/2023    Hemoglobin A1C     Standing Status:   Future     Standing Expiration Date:   4/29/2023     Orders Placed This Encounter   Medications    Dulaglutide (TRULICITY) 9.20 DJ/1.2AB SOPN     Sig: Inject 0.75 mg into the skin once a week     Dispense:  4 pen     Refill:  5    atorvastatin (LIPITOR) 20 MG tablet     Sig: Take 1 tablet by mouth nightly     Dispense:  90 tablet     Refill:  3    metFORMIN (GLUCOPHAGE) 500 MG tablet     Sig: Take 1 tablet by mouth 3 times daily (before meals)     Dispense:  270 tablet     Refill:  3       Patient given educational materials - see patient instructions. Discussed use, benefit, and side effects of prescribed medications. All patient questions answered. Pt voiced understanding. Reviewed health maintenance. Instructed to continue current medications, diet andexercise. Patient agreed with treatment plan. Follow up as directed.      Electronicallysigned by Mustapha Bell MD on 4/29/2022 at 9:05 AM

## 2022-05-01 LAB
ESTIMATED AVERAGE GLUCOSE: 209 MG/DL
HBA1C MFR BLD: 8.9 % (ref 4–6)

## 2022-05-12 LAB
HCT VFR BLD CALC: 39.5 % (ref 40.7–50.3)
HEMOGLOBIN: 13.6 G/DL (ref 13–17)
MCH RBC QN AUTO: 30.5 PG (ref 25.2–33.5)
MCHC RBC AUTO-ENTMCNC: 34.4 G/DL (ref 28.4–34.8)
MCV RBC AUTO: 88.6 FL (ref 82.6–102.9)
NRBC AUTOMATED: 0 PER 100 WBC
PDW BLD-RTO: 13 % (ref 11.8–14.4)
PLATELET # BLD: 259 K/UL (ref 138–453)
PMV BLD AUTO: 11.3 FL (ref 8.1–13.5)
RBC # BLD: 4.46 M/UL (ref 4.21–5.77)
WBC # BLD: 9.3 K/UL (ref 3.5–11.3)

## 2022-05-13 LAB
ALBUMIN SERPL-MCNC: 4.2 G/DL (ref 3.5–5.2)
ALBUMIN/GLOBULIN RATIO: 1.2 (ref 1–2.5)
ALP BLD-CCNC: 79 U/L (ref 40–129)
ALT SERPL-CCNC: 36 U/L (ref 5–41)
ANION GAP SERPL CALCULATED.3IONS-SCNC: 12 MMOL/L (ref 9–17)
AST SERPL-CCNC: 30 U/L
BILIRUB SERPL-MCNC: 0.52 MG/DL (ref 0.3–1.2)
BUN BLDV-MCNC: 14 MG/DL (ref 8–23)
CALCIUM SERPL-MCNC: 9.2 MG/DL (ref 8.6–10.4)
CHLORIDE BLD-SCNC: 100 MMOL/L (ref 98–107)
CO2: 25 MMOL/L (ref 20–31)
CREAT SERPL-MCNC: 0.79 MG/DL (ref 0.7–1.2)
GFR AFRICAN AMERICAN: >60 ML/MIN
GFR NON-AFRICAN AMERICAN: >60 ML/MIN
GFR SERPL CREATININE-BSD FRML MDRD: ABNORMAL ML/MIN/{1.73_M2}
GLUCOSE BLD-MCNC: 186 MG/DL (ref 70–99)
POTASSIUM SERPL-SCNC: 4.2 MMOL/L (ref 3.7–5.3)
SODIUM BLD-SCNC: 137 MMOL/L (ref 135–144)
TOTAL PROTEIN: 7.7 G/DL (ref 6.4–8.3)

## 2022-06-21 DIAGNOSIS — I10 ESSENTIAL HYPERTENSION: ICD-10-CM

## 2022-06-21 RX ORDER — CARVEDILOL 25 MG/1
TABLET ORAL
Qty: 180 TABLET | Refills: 3 | Status: SHIPPED | OUTPATIENT
Start: 2022-06-21

## 2022-07-27 DIAGNOSIS — E66.9 DIABETES MELLITUS TYPE 2 IN OBESE (HCC): ICD-10-CM

## 2022-07-27 DIAGNOSIS — E11.69 DIABETES MELLITUS TYPE 2 IN OBESE (HCC): ICD-10-CM

## 2022-07-28 LAB
ESTIMATED AVERAGE GLUCOSE: 154 MG/DL
HBA1C MFR BLD: 7 % (ref 4–6)

## 2022-07-29 ENCOUNTER — OFFICE VISIT (OUTPATIENT)
Dept: PRIMARY CARE CLINIC | Age: 62
End: 2022-07-29
Payer: COMMERCIAL

## 2022-07-29 VITALS
BODY MASS INDEX: 37.19 KG/M2 | HEIGHT: 77 IN | SYSTOLIC BLOOD PRESSURE: 136 MMHG | DIASTOLIC BLOOD PRESSURE: 84 MMHG | OXYGEN SATURATION: 95 % | WEIGHT: 315 LBS | HEART RATE: 79 BPM

## 2022-07-29 DIAGNOSIS — E11.69 DIABETES MELLITUS TYPE 2 IN OBESE (HCC): Primary | ICD-10-CM

## 2022-07-29 DIAGNOSIS — E66.9 DIABETES MELLITUS TYPE 2 IN OBESE (HCC): Primary | ICD-10-CM

## 2022-07-29 DIAGNOSIS — R53.83 FATIGUE, UNSPECIFIED TYPE: ICD-10-CM

## 2022-07-29 DIAGNOSIS — Z23 NEED FOR VACCINATION: ICD-10-CM

## 2022-07-29 PROCEDURE — 90750 HZV VACC RECOMBINANT IM: CPT | Performed by: FAMILY MEDICINE

## 2022-07-29 PROCEDURE — 99213 OFFICE O/P EST LOW 20 MIN: CPT | Performed by: FAMILY MEDICINE

## 2022-07-29 PROCEDURE — 3051F HG A1C>EQUAL 7.0%<8.0%: CPT | Performed by: FAMILY MEDICINE

## 2022-07-29 PROCEDURE — 90471 IMMUNIZATION ADMIN: CPT | Performed by: FAMILY MEDICINE

## 2022-07-29 RX ORDER — DULAGLUTIDE 0.75 MG/.5ML
0.75 INJECTION, SOLUTION SUBCUTANEOUS WEEKLY
Qty: 4 PEN | Refills: 5 | Status: SHIPPED | OUTPATIENT
Start: 2022-07-29

## 2022-07-29 ASSESSMENT — ENCOUNTER SYMPTOMS
DIARRHEA: 0
NAUSEA: 0
SORE THROAT: 0
EYE DISCHARGE: 0
ABDOMINAL PAIN: 0
RHINORRHEA: 0
VOMITING: 0
COUGH: 0
WHEEZING: 0
EYE REDNESS: 0
SHORTNESS OF BREATH: 0

## 2022-07-29 NOTE — PROGRESS NOTES
[address]  PRIMARY CARE  [address] [address] [place] [address]  Dept: [phone]    Fabián López is a 60 y.o. male Established patient, who presents today for his medical conditions/complaints as noted below. Chief Complaint   Patient presents with    Diabetes       HPI:     HPI  Pt started trulicity. Had 1.9 drop in a1c. No chest pain or sob. No fever or chills. Mood doing well. C/o daytime fatigue. Does snore. Has been trying to lose weight.   No melena or hematochezia    Reviewed prior notes None  Reviewed previous Labs    LDL Cholesterol (mg/dL)   Date Value   12/01/2021 108     LDL Calculated (mg/dL)   Date Value   06/11/2020 101   04/27/2018 104   09/23/2016 103       (goal LDL is <100)   AST (U/L)   Date Value   05/12/2022 30     ALT (U/L)   Date Value   05/12/2022 36     BUN (mg/dL)   Date Value   05/12/2022 14     Hemoglobin A1C (%)   Date Value   07/27/2022 7.0 (H)     TSH (uIU/mL)   Date Value   06/11/2020 1.84     BP Readings from Last 3 Encounters:   07/29/22 136/84   04/29/22 136/84   10/29/21 134/82          (goal 120/80)    Past Medical History:   Diagnosis Date    Arthritis     Asthma     Cancer ([facility] [address] 2012    bladder    Depression     Diabetes mellitus ([facility] [address]     borderline    [place] (hard of hearing)     bilateral hearing aids    Hypertension     Obesity     SOB (shortness of breath) on exertion     Umbilical hernia     Wears glasses       Past Surgical History:   Procedure Laterality Date    COLONOSCOPY  2017    normal    CYSTOSCOPY  5/21/14    several    CYSTOSCOPY  8/21/14    CYSTOSCOPY  11-21-14    CYSTOSCOPY N/A 8/27/2019    CYSTOSCOPY URETHRAL DILATATION performed by Sofía Beverly MD at [facility] N/A 8/21/2020    CYSTOSCOPY URETHRAL DILATATION WITH FISH TEST AND URINE CULTURE performed by Sofía Beverly MD at [facility] N/A 9/8/2020    CYSTOSCOPY AND RESECTION OF SMALL BLADDER TUMOR performed by Sofía Beverly MD at [facility]    CYSTOSCOPY N/A 2020    CYSTOSCOPY performed by Oniel Amos MD at [address] N/A 2021    CYSTOSCOPY performed by Oniel Amos MD at [address]      umbilical    REPAIR UMBILICAL BEGX,2+M/M,XLLJFH N/A 2018    HERNIA INCARCERATED UMBILICAL REPAIR W/MESH performed by Damien Hurley MD at [address] History   Problem Relation Age of Onset    Diabetes Father     Coronary Art Dis Father     Lung Cancer Father     Diabetes Mother     Hypertension Mother     Depression Mother     Cancer Brother         bladder       Social History     Tobacco Use    Smoking status: Former     Packs/day: 1.00     Years: 20.00     Pack years: 20.00     Types: Cigarettes     Start date: 1977     Quit date: 2001     Years since quittin.4    Smokeless tobacco: Former     Quit date: 2004   Substance Use Topics    Alcohol use: Yes     Types: 1 Cans of beer per week     Comment: rare      Current Outpatient Medications   Medication Sig Dispense Refill    Dulaglutide (TRULICITY) 9.31 WO/9.4GH SOPN Inject 0.75 mg into the skin once a week 4 pen 5    carvedilol (COREG) 25 MG tablet take 1 tablet by mouth twice a day with meals 180 tablet 3    metFORMIN (GLUCOPHAGE) 500 MG tablet Take 1 tablet by mouth 3 times daily (before meals) 270 tablet 3    furosemide (LASIX) 40 MG tablet Take 1.5 tablets by mouth 2 times daily 180 tablet 3    potassium chloride (KLOR-CON M) 20 MEQ extended release tablet take 1 tablet by mouth once daily 240 tablet 0    tamsulosin (FLOMAX) 0.4 MG capsule Take 1 capsule by mouth daily 30 capsule 3    albuterol sulfate HFA (PROAIR HFA) 108 (90 Base) MCG/ACT inhaler Inhale 2 puffs into the lungs every 6 hours as needed for Wheezing or Shortness of Breath 1 each 5    meloxicam (MOBIC) 15 MG tablet TAKE 1 TABLET BY MOUTH EVERY DAY AS NEEDED WITH FOOD      aspirin 81 MG tablet Take 81 mg by mouth daily.       Multiple Vitamins-Minerals (MULTIVITAMIN & MINERAL PO) Take 1 tablet by mouth daily. atorvastatin (LIPITOR) 20 MG tablet Take 1 tablet by mouth nightly 90 tablet 3     No current facility-administered medications for this visit. No Known Allergies    Health Maintenance   Topic Date Due    HIV screen  Never done    COVID-19 Vaccine (4 - Booster for Pfizer series) 03/10/2022    Shingles vaccine (2 of 2) 06/24/2022    Flu vaccine (1) 09/01/2022    Diabetic foot exam  10/29/2022    Diabetic microalbuminuria test  12/01/2022    Lipids  12/01/2022    Prostate Specific Antigen (PSA) Screening or Monitoring  12/01/2022    Depression Screen  04/29/2023    Diabetic retinal exam  05/04/2023    A1C test (Diabetic or Prediabetic)  07/27/2023    Pneumococcal 0-64 years Vaccine (3 - PPSV23 or PCV20) 02/02/2024    Colorectal Cancer Screen  10/13/2026    DTaP/Tdap/Td vaccine (4 - Td or Tdap) 05/07/2029    Hepatitis C screen  Completed    Hepatitis A vaccine  Aged Out    Hib vaccine  Aged Out    Meningococcal (ACWY) vaccine  Aged Out       Subjective:      Review of Systems   Constitutional:  Positive for fatigue. Negative for chills and fever. HENT:  Negative for rhinorrhea and sore throat. Eyes:  Negative for discharge and redness. Respiratory:  Negative for cough, shortness of breath and wheezing. Cardiovascular:  Negative for chest pain and palpitations. Gastrointestinal:  Negative for abdominal pain, diarrhea, nausea and vomiting. Genitourinary:  Negative for dysuria and frequency. Musculoskeletal:  Negative for arthralgias and myalgias. Neurological:  Negative for dizziness, light-headedness and headaches. Psychiatric/Behavioral:  Negative for sleep disturbance. Objective:     /84   Pulse 79   Ht 6' 5.04\" (1.957 m)   Wt (!) 398 lb 6.4 oz (180.7 kg)   SpO2 95%   BMI 47.19 kg/m²   Physical Exam  Vitals and nursing note reviewed. Constitutional:       General: He is not in acute distress.      Appearance: He is well-developed. He is not ill-appearing. HENT:      Head: Normocephalic and atraumatic. Right Ear: External ear normal.      Left Ear: External ear normal.   Eyes:      General: No scleral icterus. Right eye: No discharge. Left eye: No discharge. Conjunctiva/sclera: Conjunctivae normal.   Neck:      Thyroid: No thyromegaly. Trachea: No tracheal deviation. Cardiovascular:      Rate and Rhythm: Normal rate and regular rhythm. Heart sounds: Normal heart sounds. Pulmonary:      Effort: Pulmonary effort is normal. No respiratory distress. Breath sounds: Normal breath sounds. No wheezing. Lymphadenopathy:      Cervical: No cervical adenopathy. Skin:     General: Skin is warm. Findings: No rash. Neurological:      Mental Status: He is alert and oriented to person, place, and time. Psychiatric:         Mood and Affect: Mood normal.         Behavior: Behavior normal.         Thought Content: Thought content normal.       Assessment:       Diagnosis Orders   1. Diabetes mellitus type 2 in obese (HCC)  Dulaglutide (TRULICITY) 1.67 NU/1.4QR SOPN    Hemoglobin A1C      2. Need for vaccination  Zoster, SHINGRIX, (18 yrs +), IM      3. Fatigue, unspecified type  Home Sleep Study             Plan:    Sleep study ordered  Continue Trulicity as is  Shingrix today  Weight loss discussed     Return in about 4 months (around 11/29/2022). Orders Placed This Encounter   Procedures    Zoster, SHINGRIX, (18 yrs +), IM    Hemoglobin A1C     Standing Status:   Future     Standing Expiration Date:   11/20/2023    Home Sleep Study     Standing Status:   Future     Standing Expiration Date:   7/29/2023     Order Specific Question:   Location For Sleep Study     Answer: [facility] Specific Question:   Select Sleep Lab Location     Answer:   [facility]     Order Specific Question:   Pre-Study Patient Questions:      Answer:   Complains of daytime sleepiness     Orders Placed This Encounter   Medications    Dulaglutide (TRULICITY) 7.81 KH/6.3ZM SOPN     Sig: Inject 0.75 mg into the skin once a week     Dispense:  4 pen     Refill:  5       Patient given educational materials - see patient instructions. Discussed use, benefit, and side effects of prescribed medications. All patient questions answered. Pt voiced understanding. Reviewed health maintenance. Instructed to continue current medications, diet andexercise. Patient agreed with treatment plan. Follow up as directed.      Electronicallysigned by Elyse Moncada MD on 7/29/2022 at 9:26 AM

## 2022-08-03 ENCOUNTER — HOSPITAL ENCOUNTER (OUTPATIENT)
Dept: SLEEP CENTER | Age: 62
Discharge: HOME | End: 2022-08-05
Payer: COMMERCIAL

## 2022-08-03 DIAGNOSIS — R53.83 FATIGUE, UNSPECIFIED TYPE: ICD-10-CM

## 2022-08-03 PROCEDURE — G0399 HOME SLEEP TEST/TYPE 3 PORTA: HCPCS

## 2022-08-10 RX ORDER — POTASSIUM CHLORIDE 20 MEQ/1
TABLET, EXTENDED RELEASE ORAL
Qty: 240 TABLET | Refills: 0 | Status: SHIPPED | OUTPATIENT
Start: 2022-08-10

## 2022-08-11 LAB — STATUS: NORMAL

## 2022-09-20 ENCOUNTER — OFFICE VISIT (OUTPATIENT)
Dept: PRIMARY CARE CLINIC | Age: 62
End: 2022-09-20
Payer: COMMERCIAL

## 2022-09-20 VITALS
BODY MASS INDEX: 45.99 KG/M2 | WEIGHT: 315 LBS | DIASTOLIC BLOOD PRESSURE: 84 MMHG | SYSTOLIC BLOOD PRESSURE: 128 MMHG | OXYGEN SATURATION: 95 % | HEART RATE: 79 BPM

## 2022-09-20 DIAGNOSIS — E66.9 DIABETES MELLITUS TYPE 2 IN OBESE (HCC): ICD-10-CM

## 2022-09-20 DIAGNOSIS — E11.69 DIABETES MELLITUS TYPE 2 IN OBESE (HCC): ICD-10-CM

## 2022-09-20 DIAGNOSIS — Z23 NEED FOR VACCINATION: ICD-10-CM

## 2022-09-20 DIAGNOSIS — G47.33 OSA (OBSTRUCTIVE SLEEP APNEA): Primary | ICD-10-CM

## 2022-09-20 DIAGNOSIS — I10 ESSENTIAL HYPERTENSION: ICD-10-CM

## 2022-09-20 DIAGNOSIS — C67.9 MALIGNANT NEOPLASM OF URINARY BLADDER, UNSPECIFIED SITE (HCC): ICD-10-CM

## 2022-09-20 PROCEDURE — 99213 OFFICE O/P EST LOW 20 MIN: CPT | Performed by: FAMILY MEDICINE

## 2022-09-20 PROCEDURE — 3051F HG A1C>EQUAL 7.0%<8.0%: CPT | Performed by: FAMILY MEDICINE

## 2022-09-20 PROCEDURE — 90674 CCIIV4 VAC NO PRSV 0.5 ML IM: CPT | Performed by: FAMILY MEDICINE

## 2022-09-20 PROCEDURE — 90471 IMMUNIZATION ADMIN: CPT | Performed by: FAMILY MEDICINE

## 2022-09-20 ASSESSMENT — ENCOUNTER SYMPTOMS
COUGH: 0
VOMITING: 0
EYE DISCHARGE: 0
WHEEZING: 0
EYE REDNESS: 0
NAUSEA: 0
ABDOMINAL PAIN: 0
SHORTNESS OF BREATH: 0
SORE THROAT: 0
DIARRHEA: 0
RHINORRHEA: 0

## 2022-09-20 NOTE — PROGRESS NOTES
[facility] PRIMARY CARE  [address] [place] [id number]  Dept: [phone]    Fabián Kennedy is a 60 y.o. male Established patient, who presents today for his medical conditions/complaints as noted below. Chief Complaint   Patient presents with    Results     Sleep study results        HPI:     HPI  Pt states blood sugars doing ok. No chest pain or sob. Sugar 134 this am.    Has loud snoring at night, some daytime fatigue. Had sleep study, showing moderate AYAH.      Reviewed prior notes None  Reviewed previous Labs    LDL Cholesterol (mg/dL)   Date Value   12/01/2021 108     LDL Calculated (mg/dL)   Date Value   06/11/2020 101   04/27/2018 104   09/23/2016 103       (goal LDL is <100)   AST (U/L)   Date Value   05/12/2022 30     ALT (U/L)   Date Value   05/12/2022 36     BUN (mg/dL)   Date Value   05/12/2022 14     Hemoglobin A1C (%)   Date Value   07/27/2022 7.0 (H)     TSH (uIU/mL)   Date Value   06/11/2020 1.84     BP Readings from Last 3 Encounters:   09/20/22 128/84   07/29/22 136/84   04/29/22 136/84          (goal 120/80)    Past Medical History:   Diagnosis Date    Arthritis     Asthma     Cancer ([facility] [address] 2012    bladder    Depression     Diabetes mellitus ([facility] [address]     borderline    [place] (hard of hearing)     bilateral hearing aids    Hypertension     Obesity     SOB (shortness of breath) on exertion     Umbilical hernia     Wears glasses       Past Surgical History:   Procedure Laterality Date    COLONOSCOPY  2017    normal    CYSTOSCOPY  5/21/14    several    CYSTOSCOPY  8/21/14    CYSTOSCOPY  11-21-14    CYSTOSCOPY N/A 8/27/2019    CYSTOSCOPY URETHRAL DILATATION performed by Zuleima Gutierrez MD at [facility] N/A 8/21/2020    CYSTOSCOPY URETHRAL DILATATION WITH FISH TEST AND URINE CULTURE performed by Zuleima Gutierrez MD at [facility] N/A 9/8/2020    CYSTOSCOPY AND RESECTION OF SMALL BLADDER TUMOR performed by Zuleima Gutierrez MD at [address] [place] [address]  CYSTOSCOPY N/A 2020    CYSTOSCOPY performed by Tee Baig MD at [address] N/A 2021    CYSTOSCOPY performed by Tee Baig MD at [facility]      umbilical    REPAIR UMBILICAL EWAC,1+D/X,MVZOFP N/A 2018    HERNIA INCARCERATED UMBILICAL REPAIR W/MESH performed by Lon Evans MD at [address] History   Problem Relation Age of Onset    Diabetes Father     Coronary Art Dis Father     Lung Cancer Father     Diabetes Mother     Hypertension Mother     Depression Mother     Cancer Brother         bladder       Social History     Tobacco Use    Smoking status: Former     Packs/day: 1.00     Years: 20.00     Pack years: 20.00     Types: Cigarettes     Start date: 1977     Quit date: 2001     Years since quittin.6    Smokeless tobacco: Former     Quit date: 2004   Substance Use Topics    Alcohol use: Yes     Types: 1 Cans of beer per week     Comment: rare      Current Outpatient Medications   Medication Sig Dispense Refill    potassium chloride (KLOR-CON M) 20 MEQ extended release tablet take 1 tablet by mouth once daily 240 tablet 0    Dulaglutide (TRULICITY) 2.71 PS/7.8HJ SOPN Inject 0.75 mg into the skin once a week 4 pen 5    carvedilol (COREG) 25 MG tablet take 1 tablet by mouth twice a day with meals 180 tablet 3    atorvastatin (LIPITOR) 20 MG tablet Take 1 tablet by mouth nightly 90 tablet 3    metFORMIN (GLUCOPHAGE) 500 MG tablet Take 1 tablet by mouth 3 times daily (before meals) 270 tablet 3    furosemide (LASIX) 40 MG tablet Take 1.5 tablets by mouth 2 times daily 180 tablet 3    tamsulosin (FLOMAX) 0.4 MG capsule Take 1 capsule by mouth daily 30 capsule 3    albuterol sulfate HFA (PROAIR HFA) 108 (90 Base) MCG/ACT inhaler Inhale 2 puffs into the lungs every 6 hours as needed for Wheezing or Shortness of Breath 1 each 5    meloxicam (MOBIC) 15 MG tablet TAKE 1 TABLET BY MOUTH EVERY DAY AS NEEDED WITH FOOD      aspirin 81 MG tablet Take 81 mg by mouth daily. Multiple Vitamins-Minerals (MULTIVITAMIN & MINERAL PO) Take 1 tablet by mouth daily. No current facility-administered medications for this visit. No Known Allergies    Health Maintenance   Topic Date Due    HIV screen  Never done    COVID-19 Vaccine (4 - Booster for Pfizer series) 03/10/2022    Flu vaccine (1) 09/01/2022    Diabetic foot exam  10/29/2022    Diabetic microalbuminuria test  12/01/2022    Lipids  12/01/2022    Depression Screen  04/29/2023    Diabetic retinal exam  05/04/2023    A1C test (Diabetic or Prediabetic)  07/27/2023    Pneumococcal 0-64 years Vaccine (3 - PPSV23 or PCV20) 02/02/2024    Colorectal Cancer Screen  10/13/2026    DTaP/Tdap/Td vaccine (4 - Td or Tdap) 05/07/2029    Shingles vaccine  Completed    Hepatitis C screen  Completed    Hepatitis A vaccine  Aged Out    Hib vaccine  Aged Out    Meningococcal (ACWY) vaccine  Aged Out       Subjective:      Review of Systems   Constitutional:  Negative for chills and fever. HENT:  Negative for rhinorrhea and sore throat. Eyes:  Negative for discharge and redness. Respiratory:  Negative for cough, shortness of breath and wheezing. Cardiovascular:  Negative for chest pain and palpitations. Gastrointestinal:  Negative for abdominal pain, diarrhea, nausea and vomiting. Genitourinary:  Negative for dysuria and frequency. Musculoskeletal:  Negative for arthralgias and myalgias. Neurological:  Negative for dizziness, light-headedness and headaches. Psychiatric/Behavioral:  Negative for sleep disturbance. Objective:     /84   Pulse 79   Wt (!) 388 lb 3.2 oz (176.1 kg)   SpO2 95%   BMI 45.99 kg/m²   Physical Exam  Vitals and nursing note reviewed. Constitutional:       General: He is not in acute distress. Appearance: He is well-developed. He is not ill-appearing. HENT:      Head: Normocephalic and atraumatic.       Right Ear: External ear normal.      Left Ear: External ear normal.   Eyes:      General: No scleral icterus. Right eye: No discharge. Left eye: No discharge. Conjunctiva/sclera: Conjunctivae normal.   Neck:      Thyroid: No thyromegaly. Trachea: No tracheal deviation. Cardiovascular:      Rate and Rhythm: Normal rate and regular rhythm. Heart sounds: Normal heart sounds. Pulmonary:      Effort: Pulmonary effort is normal. No respiratory distress. Breath sounds: Normal breath sounds. No wheezing. Lymphadenopathy:      Cervical: No cervical adenopathy. Skin:     General: Skin is warm. Findings: No rash. Neurological:      Mental Status: He is alert and oriented to person, place, and time. Psychiatric:         Mood and Affect: Mood normal.         Behavior: Behavior normal.         Thought Content: Thought content normal.       Assessment:       Diagnosis Orders   1. AYAH (obstructive sleep apnea)        2. Essential hypertension        3. Malignant neoplasm of urinary bladder, unspecified site ([facility] [address]        4. Diabetes mellitus type 2 in obese [facility])             Plan:    Order for cpap machine as recommended  Continue diabetic meds as is   Continue blood pressure medication as is  COVID booster recommended  Pt not able to get cystoscopy done due to owing urology money   Flu shot discussed     Return if symptoms worsen or fail to improve. No orders of the defined types were placed in this encounter. No orders of the defined types were placed in this encounter. Patient given educational materials - see patient instructions. Discussed use, benefit, and side effects of prescribed medications. All patient questions answered. Pt voiced understanding. Reviewed health maintenance. Instructed to continue current medications, diet andexercise. Patient agreed with treatment plan. Follow up as directed.      Electronicallysigned by Jimenez Clark MD on 9/20/2022 at 3:04 PM

## 2022-10-27 RX ORDER — TAMSULOSIN HYDROCHLORIDE 0.4 MG/1
CAPSULE ORAL
Qty: 30 CAPSULE | Refills: 3 | Status: SHIPPED | OUTPATIENT
Start: 2022-10-27

## 2022-12-12 DIAGNOSIS — E11.69 DIABETES MELLITUS TYPE 2 IN OBESE (HCC): ICD-10-CM

## 2022-12-12 DIAGNOSIS — E66.9 DIABETES MELLITUS TYPE 2 IN OBESE (HCC): ICD-10-CM

## 2022-12-12 LAB
ESTIMATED AVERAGE GLUCOSE: 131 MG/DL
HBA1C MFR BLD: 6.2 % (ref 4–6)

## 2022-12-15 DIAGNOSIS — E11.69 DIABETES MELLITUS TYPE 2 IN OBESE (HCC): ICD-10-CM

## 2022-12-15 DIAGNOSIS — E66.9 DIABETES MELLITUS TYPE 2 IN OBESE (HCC): ICD-10-CM

## 2022-12-15 RX ORDER — FUROSEMIDE 40 MG/1
TABLET ORAL
Qty: 180 TABLET | Refills: 3 | Status: SHIPPED | OUTPATIENT
Start: 2022-12-15

## 2022-12-16 ENCOUNTER — OFFICE VISIT (OUTPATIENT)
Dept: PRIMARY CARE CLINIC | Age: 62
End: 2022-12-16
Payer: COMMERCIAL

## 2022-12-16 VITALS
HEIGHT: 77 IN | WEIGHT: 315 LBS | SYSTOLIC BLOOD PRESSURE: 132 MMHG | HEART RATE: 90 BPM | DIASTOLIC BLOOD PRESSURE: 82 MMHG | BODY MASS INDEX: 37.19 KG/M2 | OXYGEN SATURATION: 97 %

## 2022-12-16 DIAGNOSIS — E11.42 DIABETIC POLYNEUROPATHY ASSOCIATED WITH TYPE 2 DIABETES MELLITUS (HCC): ICD-10-CM

## 2022-12-16 DIAGNOSIS — G47.33 OSA (OBSTRUCTIVE SLEEP APNEA): ICD-10-CM

## 2022-12-16 DIAGNOSIS — E66.9 DIABETES MELLITUS TYPE 2 IN OBESE (HCC): Primary | ICD-10-CM

## 2022-12-16 DIAGNOSIS — E11.69 DIABETES MELLITUS TYPE 2 IN OBESE (HCC): Primary | ICD-10-CM

## 2022-12-16 PROCEDURE — 3078F DIAST BP <80 MM HG: CPT | Performed by: FAMILY MEDICINE

## 2022-12-16 PROCEDURE — 3044F HG A1C LEVEL LT 7.0%: CPT | Performed by: FAMILY MEDICINE

## 2022-12-16 PROCEDURE — 99213 OFFICE O/P EST LOW 20 MIN: CPT | Performed by: FAMILY MEDICINE

## 2022-12-16 PROCEDURE — 3074F SYST BP LT 130 MM HG: CPT | Performed by: FAMILY MEDICINE

## 2022-12-16 ASSESSMENT — ENCOUNTER SYMPTOMS
ABDOMINAL PAIN: 0
SORE THROAT: 0
EYE REDNESS: 0
SHORTNESS OF BREATH: 0
COUGH: 0
NAUSEA: 0
WHEEZING: 0
EYE DISCHARGE: 0
DIARRHEA: 0
RHINORRHEA: 0
VOMITING: 0

## 2022-12-16 NOTE — PROGRESS NOTES
[address] [facility]  PRIMARY CARE  [address] [address] [place] [address]  Dept: [phone]    Fabián Hooper is a 60 y.o. male Established patient, who presents today for his medical conditions/complaints as noted below. Chief Complaint   Patient presents with    Diabetes       HPI:     HPI  Patient states blood sugars been running in a good range. Denies any low blood sugar reactions. Has been trying to lose weight. Patient just got his sleep apnea machine. States will be having a Zoom meeting to learn how to use it. Denies any fevers or chills. No lightheadedness or dizziness.     Reviewed prior notes None  Reviewed previous Labs    LDL Cholesterol (mg/dL)   Date Value   12/01/2021 108     LDL Calculated (mg/dL)   Date Value   06/11/2020 101   04/27/2018 104   09/23/2016 103       (goal LDL is <100)   AST (U/L)   Date Value   05/12/2022 30     ALT (U/L)   Date Value   05/12/2022 36     BUN (mg/dL)   Date Value   05/12/2022 14     Hemoglobin A1C (%)   Date Value   12/12/2022 6.2 (H)     TSH (uIU/mL)   Date Value   06/11/2020 1.84     BP Readings from Last 3 Encounters:   12/16/22 132/82   09/20/22 128/84   07/29/22 136/84          (goal 120/80)    Past Medical History:   Diagnosis Date    Arthritis     Asthma     Cancer ([facility] [address] 2012    bladder    Depression     Diabetes mellitus ([address]     borderline    [place] (hard of hearing)     bilateral hearing aids    Hypertension     Obesity     SOB (shortness of breath) on exertion     Umbilical hernia     Wears glasses       Past Surgical History:   Procedure Laterality Date    COLONOSCOPY  2017    normal    CYSTOSCOPY  5/21/14    several    CYSTOSCOPY  8/21/14    CYSTOSCOPY  11-21-14    CYSTOSCOPY N/A 8/27/2019    CYSTOSCOPY URETHRAL DILATATION performed by Gustavo Donovan MD at [address] N/A 8/21/2020    CYSTOSCOPY URETHRAL DILATATION WITH FISH TEST AND URINE CULTURE performed by Gustavo Donovan MD at [address] N/A 2020    CYSTOSCOPY AND RESECTION OF SMALL BLADDER TUMOR performed by Svetlana Leach MD at [address] N/A 2020    CYSTOSCOPY performed by Svetlana Leach MD at [address] N/A 2021    CYSTOSCOPY performed by Svetlana Leach MD at [address]      umbilical    REPAIR UMBILICAL VLPA,9+I/E,NGYWVB N/A 2018    HERNIA INCARCERATED UMBILICAL REPAIR W/MESH performed by Za Somers MD at [address] History   Problem Relation Age of Onset    Diabetes Father     Coronary Art Dis Father     Lung Cancer Father     Diabetes Mother     Hypertension Mother     Depression Mother     Cancer Brother         bladder       Social History     Tobacco Use    Smoking status: Former     Packs/day: 1.00     Years: 20.00     Pack years: 20.00     Types: Cigarettes     Start date: 1977     Quit date: 2001     Years since quittin.8    Smokeless tobacco: Former     Quit date: 2004   Substance Use Topics    Alcohol use: Yes     Types: 1 Cans of beer per week     Comment: rare      Current Outpatient Medications   Medication Sig Dispense Refill    furosemide (LASIX) 40 MG tablet take 1 and 1/2 tablets by mouth twice a day 180 tablet 3    tamsulosin (FLOMAX) 0.4 MG capsule take 1 capsule by mouth once daily 30 capsule 3    potassium chloride (KLOR-CON M) 20 MEQ extended release tablet take 1 tablet by mouth once daily 240 tablet 0    Dulaglutide (TRULICITY) 6.47 RN/5.8NX SOPN Inject 0.75 mg into the skin once a week 4 pen 5    carvedilol (COREG) 25 MG tablet take 1 tablet by mouth twice a day with meals 180 tablet 3    metFORMIN (GLUCOPHAGE) 500 MG tablet Take 1 tablet by mouth 3 times daily (before meals) 270 tablet 3    albuterol sulfate HFA (PROAIR HFA) 108 (90 Base) MCG/ACT inhaler Inhale 2 puffs into the lungs every 6 hours as needed for Wheezing or Shortness of Breath 1 each 5    aspirin 81 MG tablet Take 81 mg by mouth daily.       Multiple Vitamins-Minerals (MULTIVITAMIN & MINERAL PO) Take 1 tablet by mouth daily. atorvastatin (LIPITOR) 20 MG tablet Take 1 tablet by mouth nightly 90 tablet 3     No current facility-administered medications for this visit. No Known Allergies    Health Maintenance   Topic Date Due    HIV screen  Never done    COVID-19 Vaccine (4 - Booster for Pfizer series) 01/05/2022    Diabetic microalbuminuria test  12/01/2022    Lipids  12/01/2022    Depression Screen  04/29/2023    Diabetic retinal exam  05/04/2023    A1C test (Diabetic or Prediabetic)  12/12/2023    Diabetic foot exam  12/16/2023    Pneumococcal 0-64 years Vaccine (3 - PPSV23 if available, else PCV20) 02/02/2024    Colorectal Cancer Screen  10/13/2026    DTaP/Tdap/Td vaccine (4 - Td or Tdap) 05/07/2029    Flu vaccine  Completed    Shingles vaccine  Completed    Hepatitis C screen  Completed    Hepatitis A vaccine  Aged Out    Hib vaccine  Aged Out    Meningococcal (ACWY) vaccine  Aged Out       Subjective:      Review of Systems   Constitutional:  Negative for chills and fever. HENT:  Negative for rhinorrhea and sore throat. Eyes:  Negative for discharge and redness. Respiratory:  Negative for cough, shortness of breath and wheezing. Cardiovascular:  Negative for chest pain and palpitations. Gastrointestinal:  Negative for abdominal pain, diarrhea, nausea and vomiting. Genitourinary:  Negative for dysuria and frequency. Musculoskeletal:  Negative for arthralgias and myalgias. Neurological:  Negative for dizziness, light-headedness and headaches. Psychiatric/Behavioral:  Negative for sleep disturbance. Objective:     /82   Pulse 90   Ht 6' 5.04\" (1.957 m)   Wt (!) 391 lb 9.6 oz (177.6 kg)   SpO2 97%   BMI 46.39 kg/m²   Physical Exam  Vitals and nursing note reviewed. Constitutional:       General: He is not in acute distress. Appearance: He is well-developed. HENT:      Head: Normocephalic and atraumatic. Right Ear: External ear normal.      Left Ear: External ear normal.   Eyes:      General:         Right eye: No discharge. Left eye: No discharge. Conjunctiva/sclera: Conjunctivae normal.      Pupils: Pupils are equal, round, and reactive to light. Neck:      Thyroid: No thyromegaly. Trachea: No tracheal deviation. Cardiovascular:      Rate and Rhythm: Normal rate and regular rhythm. Heart sounds: Normal heart sounds. Comments: No carotid bruits  Pulmonary:      Effort: Pulmonary effort is normal. No respiratory distress. Breath sounds: Normal breath sounds. No wheezing. Abdominal:      General: Abdomen is flat. Palpations: Abdomen is soft. Lymphadenopathy:      Cervical: No cervical adenopathy. Skin:     General: Skin is warm and dry. Findings: No rash. Neurological:      Mental Status: He is alert and oriented to person, place, and time. Comments: Diabetic foot check: Bunions: none . Hammertoes none. Plantar calluses mod. No cyanosis or clubbing. sensation intact on 1 of 6 test points with the 10 gram filament. Dorsalis pedis pulses intact bilaterally. Capillary refill at the toes was less than 2 seconds. No skin breakdown, erythema, blisters, scaling, or ulcers. No evidence of fungal infection. Psychiatric:         Behavior: Behavior normal.         Thought Content: Thought content normal.       Assessment:       Diagnosis Orders   1. Diabetes mellitus type 2 in obese (HCC)  Microalbumin, Ur    HM DIABETES FOOT EXAM      2. Diabetic polyneuropathy associated with type 2 diabetes mellitus ([facility] [address]        3. AYAH (obstructive sleep apnea)             Plan:   Continue present medications as is  Order for diabetic shoes  Blood work reviewed  Patient encouraged to follow through with his sleep apnea machine  Patient again encouraged to follow-up with urology for his bladder cancer.   Urine from acro albumin ordered    Return in about 3 months (around 3/16/2023) for DIABETES. Orders Placed This Encounter   Procedures    Microalbumin, Ur     Standing Status:   Future     Standing Expiration Date:   12/16/2023     DIABETES FOOT EXAM     No orders of the defined types were placed in this encounter. Patient given educational materials - see patient instructions. Discussed use, benefit, and side effects of prescribed medications. All patient questions answered. Pt voiced understanding. Reviewed health maintenance. Instructed to continue current medications, diet andexercise. Patient agreed with treatment plan. Follow up as directed.      Electronicallysigned by Laxmi Hinson MD on 12/16/2022 at 8:44 AM

## 2023-03-06 RX ORDER — TAMSULOSIN HYDROCHLORIDE 0.4 MG/1
CAPSULE ORAL
Qty: 30 CAPSULE | Refills: 3 | Status: SHIPPED | OUTPATIENT
Start: 2023-03-06

## 2023-03-23 DIAGNOSIS — E11.69 DIABETES MELLITUS TYPE 2 IN OBESE (HCC): ICD-10-CM

## 2023-03-23 DIAGNOSIS — E66.9 DIABETES MELLITUS TYPE 2 IN OBESE (HCC): ICD-10-CM

## 2023-03-23 LAB
CREATININE URINE: 31.3 MG/DL (ref 39–259)
MICROALBUMIN/CREAT 24H UR: <12 MG/L
MICROALBUMIN/CREAT UR-RTO: ABNORMAL MCG/MG CREAT

## 2023-03-24 ENCOUNTER — OFFICE VISIT (OUTPATIENT)
Dept: PRIMARY CARE CLINIC | Age: 63
End: 2023-03-24

## 2023-03-24 VITALS
HEART RATE: 80 BPM | BODY MASS INDEX: 37.19 KG/M2 | SYSTOLIC BLOOD PRESSURE: 130 MMHG | DIASTOLIC BLOOD PRESSURE: 76 MMHG | HEIGHT: 77 IN | OXYGEN SATURATION: 97 % | WEIGHT: 315 LBS

## 2023-03-24 DIAGNOSIS — E11.69 DIABETES MELLITUS TYPE 2 IN OBESE (HCC): Primary | ICD-10-CM

## 2023-03-24 DIAGNOSIS — E66.9 DIABETES MELLITUS TYPE 2 IN OBESE (HCC): Primary | ICD-10-CM

## 2023-03-24 DIAGNOSIS — Z13.220 ENCOUNTER FOR LIPID SCREENING FOR CARDIOVASCULAR DISEASE: ICD-10-CM

## 2023-03-24 DIAGNOSIS — Z12.5 SCREENING PSA (PROSTATE SPECIFIC ANTIGEN): ICD-10-CM

## 2023-03-24 DIAGNOSIS — Z13.6 ENCOUNTER FOR LIPID SCREENING FOR CARDIOVASCULAR DISEASE: ICD-10-CM

## 2023-03-24 DIAGNOSIS — Z00.00 ANNUAL PHYSICAL EXAM: ICD-10-CM

## 2023-03-24 DIAGNOSIS — N52.9 ERECTILE DYSFUNCTION, UNSPECIFIED ERECTILE DYSFUNCTION TYPE: ICD-10-CM

## 2023-03-24 RX ORDER — SILDENAFIL 100 MG/1
100 TABLET, FILM COATED ORAL DAILY PRN
Qty: 10 TABLET | Refills: 5 | Status: SHIPPED | OUTPATIENT
Start: 2023-03-24

## 2023-03-24 RX ORDER — ATORVASTATIN CALCIUM 20 MG/1
20 TABLET, FILM COATED ORAL NIGHTLY
Qty: 90 TABLET | Refills: 3 | Status: SHIPPED | OUTPATIENT
Start: 2023-03-24 | End: 2023-06-22

## 2023-03-24 SDOH — ECONOMIC STABILITY: HOUSING INSECURITY
IN THE LAST 12 MONTHS, WAS THERE A TIME WHEN YOU DID NOT HAVE A STEADY PLACE TO SLEEP OR SLEPT IN A SHELTER (INCLUDING NOW)?: NO

## 2023-03-24 SDOH — ECONOMIC STABILITY: FOOD INSECURITY: WITHIN THE PAST 12 MONTHS, YOU WORRIED THAT YOUR FOOD WOULD RUN OUT BEFORE YOU GOT MONEY TO BUY MORE.: NEVER TRUE

## 2023-03-24 SDOH — ECONOMIC STABILITY: FOOD INSECURITY: WITHIN THE PAST 12 MONTHS, THE FOOD YOU BOUGHT JUST DIDN'T LAST AND YOU DIDN'T HAVE MONEY TO GET MORE.: NEVER TRUE

## 2023-03-24 SDOH — ECONOMIC STABILITY: INCOME INSECURITY: HOW HARD IS IT FOR YOU TO PAY FOR THE VERY BASICS LIKE FOOD, HOUSING, MEDICAL CARE, AND HEATING?: NOT HARD AT ALL

## 2023-03-24 ASSESSMENT — PATIENT HEALTH QUESTIONNAIRE - PHQ9
SUM OF ALL RESPONSES TO PHQ QUESTIONS 1-9: 0
SUM OF ALL RESPONSES TO PHQ QUESTIONS 1-9: 0
SUM OF ALL RESPONSES TO PHQ9 QUESTIONS 1 & 2: 0
SUM OF ALL RESPONSES TO PHQ QUESTIONS 1-9: 0
1. LITTLE INTEREST OR PLEASURE IN DOING THINGS: 0
SUM OF ALL RESPONSES TO PHQ QUESTIONS 1-9: 0
2. FEELING DOWN, DEPRESSED OR HOPELESS: 0

## 2023-03-24 ASSESSMENT — ENCOUNTER SYMPTOMS
NAUSEA: 0
VOMITING: 0
COUGH: 0
SHORTNESS OF BREATH: 0
EYE REDNESS: 0
DIARRHEA: 0
EYE DISCHARGE: 0
ABDOMINAL PAIN: 0
RHINORRHEA: 0
WHEEZING: 0
SORE THROAT: 0

## 2023-03-24 NOTE — PROGRESS NOTES
oz (169.3 kg)   SpO2 97%   BMI 44.22 kg/m²   Physical Exam  Vitals and nursing note reviewed. Constitutional:       General: He is not in acute distress. Appearance: He is well-developed. He is not ill-appearing. HENT:      Head: Normocephalic and atraumatic. Right Ear: External ear normal.      Left Ear: External ear normal.   Eyes:      General: No scleral icterus. Right eye: No discharge. Left eye: No discharge. Conjunctiva/sclera: Conjunctivae normal.   Neck:      Thyroid: No thyromegaly. Trachea: No tracheal deviation. Cardiovascular:      Rate and Rhythm: Normal rate and regular rhythm. Heart sounds: Normal heart sounds. Pulmonary:      Effort: Pulmonary effort is normal. No respiratory distress. Breath sounds: Normal breath sounds. No wheezing. Abdominal:      General: There is no distension. Palpations: There is no mass. Lymphadenopathy:      Cervical: No cervical adenopathy. Skin:     General: Skin is warm. Findings: No rash. Neurological:      Mental Status: He is alert and oriented to person, place, and time. Psychiatric:         Mood and Affect: Mood normal.         Behavior: Behavior normal.         Thought Content: Thought content normal.       Assessment:       Diagnosis Orders   1. Diabetes mellitus type 2 in obese (HCC)  Hemoglobin A1C    atorvastatin (LIPITOR) 20 MG tablet      2. Encounter for lipid screening for cardiovascular disease  Lipid, Fasting      3. Annual physical exam  Basic Metabolic Panel, Fasting    Hepatic Function Panel      4. Screening PSA (prostate specific antigen)  PSA Screening      5. Erectile dysfunction, unspecified erectile dysfunction type  sildenafil (VIAGRA) 100 MG tablet             Plan:    Blood work ordered  Trial of Viagra for erectile dysfunction  Weight loss encouraged    Return in about 4 months (around 7/24/2023) for DIABETES.     Orders Placed This Encounter   Procedures    Hemoglobin

## 2023-04-17 RX ORDER — POTASSIUM CHLORIDE 20 MEQ/1
TABLET, EXTENDED RELEASE ORAL
Qty: 240 TABLET | Refills: 0 | Status: SHIPPED | OUTPATIENT
Start: 2023-04-17

## 2023-04-17 NOTE — TELEPHONE ENCOUNTER
LAST VISIT:   3/24/2023     Future Appointments   Date Time Provider Jaison Lyman   7/28/2023  9:00 AM Ramiro Chacko MD [facility]

## 2023-04-20 DIAGNOSIS — E11.69 DIABETES MELLITUS TYPE 2 IN OBESE (HCC): ICD-10-CM

## 2023-04-20 DIAGNOSIS — E66.9 DIABETES MELLITUS TYPE 2 IN OBESE (HCC): ICD-10-CM

## 2023-04-20 NOTE — TELEPHONE ENCOUNTER
LAST VISIT:   3/24/2023     Future Appointments   Date Time Provider Jaison Lyman   7/28/2023  9:00 AM Allison Worthington MD [facility]

## 2023-04-21 RX ORDER — DULAGLUTIDE 0.75 MG/.5ML
INJECTION, SOLUTION SUBCUTANEOUS
Qty: 4 ML | Refills: 3 | Status: SHIPPED | OUTPATIENT
Start: 2023-04-21

## 2023-04-25 NOTE — TELEPHONE ENCOUNTER
LAST VISIT:   3/24/2023     Future Appointments   Date Time Provider Jaison Lyman   7/28/2023  9:00 AM Monica Nuñez MD [facility] Rikki Wilde

## 2023-06-19 DIAGNOSIS — I10 ESSENTIAL HYPERTENSION: ICD-10-CM

## 2023-06-19 RX ORDER — CARVEDILOL 25 MG/1
TABLET ORAL
Qty: 180 TABLET | Refills: 0 | Status: SHIPPED | OUTPATIENT
Start: 2023-06-19

## 2023-06-19 NOTE — TELEPHONE ENCOUNTER
LAST VISIT:   9/20/2022     Future Appointments   Date Time Provider Jaison Lyman   7/28/2023  9:00 AM Nimo Savage MD [facility]

## 2023-06-26 DIAGNOSIS — E11.69 DIABETES MELLITUS TYPE 2 IN OBESE (HCC): ICD-10-CM

## 2023-06-26 DIAGNOSIS — E66.9 DIABETES MELLITUS TYPE 2 IN OBESE (HCC): ICD-10-CM

## 2023-06-26 RX ORDER — DULAGLUTIDE 0.75 MG/.5ML
0.75 INJECTION, SOLUTION SUBCUTANEOUS
Qty: 4 ML | Refills: 0 | Status: SHIPPED | OUTPATIENT
Start: 2023-06-26

## 2023-07-05 RX ORDER — TAMSULOSIN HYDROCHLORIDE 0.4 MG/1
CAPSULE ORAL
Qty: 30 CAPSULE | Refills: 3 | Status: SHIPPED | OUTPATIENT
Start: 2023-07-05

## 2023-07-05 NOTE — TELEPHONE ENCOUNTER
LAST VISIT:   03/24/23    Future Appointments   Date Time Provider [address]   7/28/2023  9:00 AM Pollo Ha MD [facility]

## 2023-07-25 DIAGNOSIS — Z13.6 ENCOUNTER FOR LIPID SCREENING FOR CARDIOVASCULAR DISEASE: ICD-10-CM

## 2023-07-25 DIAGNOSIS — Z13.220 ENCOUNTER FOR LIPID SCREENING FOR CARDIOVASCULAR DISEASE: ICD-10-CM

## 2023-07-25 DIAGNOSIS — Z00.00 ANNUAL PHYSICAL EXAM: ICD-10-CM

## 2023-07-25 DIAGNOSIS — E66.9 DIABETES MELLITUS TYPE 2 IN OBESE (HCC): ICD-10-CM

## 2023-07-25 DIAGNOSIS — Z12.5 SCREENING PSA (PROSTATE SPECIFIC ANTIGEN): ICD-10-CM

## 2023-07-25 DIAGNOSIS — E11.69 DIABETES MELLITUS TYPE 2 IN OBESE (HCC): ICD-10-CM

## 2023-07-25 LAB
ALBUMIN SERPL-MCNC: 4.1 G/DL (ref 3.5–5.2)
ALBUMIN/GLOBULIN RATIO: 1.2 (ref 1–2.5)
ALP BLD-CCNC: 80 U/L (ref 40–129)
ALT SERPL-CCNC: 35 U/L (ref 5–41)
ANION GAP SERPL CALCULATED.3IONS-SCNC: 12 MMOL/L (ref 9–17)
AST SERPL-CCNC: 29 U/L
BILIRUB SERPL-MCNC: 0.6 MG/DL (ref 0.3–1.2)
BILIRUBIN DIRECT: 0.2 MG/DL
BILIRUBIN, INDIRECT: 0.4 MG/DL (ref 0–1)
BUN BLDV-MCNC: 15 MG/DL (ref 8–23)
CALCIUM SERPL-MCNC: 9.3 MG/DL (ref 8.6–10.4)
CHLORIDE BLD-SCNC: 103 MMOL/L (ref 98–107)
CHOLESTEROL, FASTING: 110 MG/DL
CHOLESTEROL/HDL RATIO: 2.4
CO2: 24 MMOL/L (ref 20–31)
CREAT SERPL-MCNC: 0.8 MG/DL (ref 0.7–1.2)
GFR SERPL CREATININE-BSD FRML MDRD: >60 ML/MIN/1.73M2
GLUCOSE FASTING: 112 MG/DL (ref 70–99)
HDLC SERPL-MCNC: 45 MG/DL
LDL CHOLESTEROL: 46 MG/DL (ref 0–130)
POTASSIUM SERPL-SCNC: 4.3 MMOL/L (ref 3.7–5.3)
PROSTATE SPECIFIC ANTIGEN: 0.94 NG/ML
SODIUM BLD-SCNC: 139 MMOL/L (ref 135–144)
TOTAL PROTEIN: 7.4 G/DL (ref 6.4–8.3)
TRIGLYCERIDE, FASTING: 96 MG/DL

## 2023-07-26 LAB
ESTIMATED AVERAGE GLUCOSE: 111 MG/DL
HBA1C MFR BLD: 5.5 % (ref 4–6)

## 2023-07-28 ENCOUNTER — OFFICE VISIT (OUTPATIENT)
Dept: PRIMARY CARE CLINIC | Age: 63
End: 2023-07-28
Payer: COMMERCIAL

## 2023-07-28 VITALS
BODY MASS INDEX: 37.19 KG/M2 | HEIGHT: 77 IN | HEART RATE: 75 BPM | OXYGEN SATURATION: 96 % | SYSTOLIC BLOOD PRESSURE: 132 MMHG | WEIGHT: 315 LBS | DIASTOLIC BLOOD PRESSURE: 82 MMHG

## 2023-07-28 DIAGNOSIS — E11.69 DIABETES MELLITUS TYPE 2 IN OBESE (HCC): Primary | ICD-10-CM

## 2023-07-28 DIAGNOSIS — E66.01 MORBID OBESITY (HCC): ICD-10-CM

## 2023-07-28 DIAGNOSIS — I10 ESSENTIAL HYPERTENSION: ICD-10-CM

## 2023-07-28 DIAGNOSIS — E66.9 DIABETES MELLITUS TYPE 2 IN OBESE (HCC): Primary | ICD-10-CM

## 2023-07-28 PROCEDURE — 99214 OFFICE O/P EST MOD 30 MIN: CPT | Performed by: FAMILY MEDICINE

## 2023-07-28 PROCEDURE — 3075F SYST BP GE 130 - 139MM HG: CPT | Performed by: FAMILY MEDICINE

## 2023-07-28 PROCEDURE — 3079F DIAST BP 80-89 MM HG: CPT | Performed by: FAMILY MEDICINE

## 2023-07-28 PROCEDURE — 3044F HG A1C LEVEL LT 7.0%: CPT | Performed by: FAMILY MEDICINE

## 2023-07-28 RX ORDER — DULAGLUTIDE 1.5 MG/.5ML
1.5 INJECTION, SOLUTION SUBCUTANEOUS WEEKLY
Qty: 4 ADJUSTABLE DOSE PRE-FILLED PEN SYRINGE | Refills: 5 | Status: SHIPPED | OUTPATIENT
Start: 2023-07-28

## 2023-07-28 ASSESSMENT — ENCOUNTER SYMPTOMS
DIARRHEA: 0
EYE DISCHARGE: 0
ABDOMINAL PAIN: 0
SORE THROAT: 0
SHORTNESS OF BREATH: 0
NAUSEA: 0
WHEEZING: 0
RHINORRHEA: 0
VOMITING: 0
COUGH: 0
EYE REDNESS: 0

## 2023-07-28 NOTE — PROGRESS NOTES
[address] [facility] PRIMARY CARE  [address] [address]  [address] [facility] [address]  Dept: [phone]    Fabián Maulik Borrero is a 58 y.o. male Established patient, who presents today for his medical conditions/complaints as noted below. Chief Complaint   Patient presents with    Diabetes       HPI:     HPI  Patient states not checking fasting blood sugars. However blood sugars normally running in the 100 range. Patient did lose 17 pounds initially on Trulicity but has plateaued. Patient not checking his blood pressure outside the office. Denies any chest heaviness. Denies any headaches. Patient tried lose weight. Not exercising however on a regular basis.       Reviewed prior notes None  Reviewed previous Labs    LDL Cholesterol (mg/dL)   Date Value   07/25/2023 46   12/01/2021 108     LDL Calculated (mg/dL)   Date Value   06/11/2020 101   04/27/2018 104   09/23/2016 103       (goal LDL is <100)   AST (U/L)   Date Value   07/25/2023 29     ALT (U/L)   Date Value   07/25/2023 35     BUN (mg/dL)   Date Value   07/25/2023 15     Hemoglobin A1C (%)   Date Value   07/25/2023 5.5     TSH (uIU/mL)   Date Value   06/11/2020 1.84     BP Readings from Last 3 Encounters:   07/28/23 132/82   03/24/23 130/76   12/16/22 132/82          (goal 120/80)    Past Medical History:   Diagnosis Date    Arthritis     Asthma     Cancer ([address] 2012    bladder    Depression     Diabetes mellitus ([address])     borderline    [place] (hard of hearing)     bilateral hearing aids    Hypertension     Obesity     Sleep apnea 09/20/22    SOB (shortness of breath) on exertion     Umbilical hernia     Wears glasses       Past Surgical History:   Procedure Laterality Date    COLONOSCOPY  2017    normal    CYSTOSCOPY  5/21/14    several    CYSTOSCOPY  8/21/14    CYSTOSCOPY  11-21-14    CYSTOSCOPY N/A 8/27/2019    CYSTOSCOPY URETHRAL DILATATION performed by Flor Haynes MD at [address] [facility] N/A 8/21/2020    CYSTOSCOPY

## 2023-08-22 DIAGNOSIS — E66.9 DIABETES MELLITUS TYPE 2 IN OBESE (HCC): ICD-10-CM

## 2023-08-22 DIAGNOSIS — E11.69 DIABETES MELLITUS TYPE 2 IN OBESE (HCC): ICD-10-CM

## 2023-08-22 RX ORDER — FUROSEMIDE 40 MG/1
TABLET ORAL
Qty: 180 TABLET | Refills: 3 | Status: SHIPPED | OUTPATIENT
Start: 2023-08-22

## 2023-08-22 NOTE — TELEPHONE ENCOUNTER
LAST VISIT:   7/28/23    Future Appointments   Date Time Provider [address]   12/1/2023  8:40 AM Tarsha Andrews MD [facility] Kelechi Cain

## 2023-09-14 DIAGNOSIS — I10 ESSENTIAL HYPERTENSION: ICD-10-CM

## 2023-09-14 RX ORDER — CARVEDILOL 25 MG/1
TABLET ORAL
Qty: 180 TABLET | Refills: 0 | Status: SHIPPED | OUTPATIENT
Start: 2023-09-14

## 2023-09-14 NOTE — TELEPHONE ENCOUNTER
LAST VISIT:   3/24/2023    Future Appointments   Date Time Provider [address]   12/1/2023  8:40 AM Martin Stroud MD [facility]

## 2023-10-25 RX ORDER — TAMSULOSIN HYDROCHLORIDE 0.4 MG/1
CAPSULE ORAL
Qty: 30 CAPSULE | Refills: 3 | Status: SHIPPED | OUTPATIENT
Start: 2023-10-25

## 2023-10-25 NOTE — TELEPHONE ENCOUNTER
LAST VISIT:   9/20/2022     Future Appointments   Date Time Provider [address]   12/1/2023  8:40 AM Candida Carrizales MD [facility]e

## 2023-11-08 ENCOUNTER — PATIENT MESSAGE (OUTPATIENT)
Dept: PRIMARY CARE CLINIC | Age: 63
End: 2023-11-08

## 2023-11-13 NOTE — TELEPHONE ENCOUNTER
Pt would rather not use [facility] in [place] due to them losing his info. Willing to come up to this area to get his diabetic shoes.  JAVID

## 2023-11-25 NOTE — TELEPHONE ENCOUNTER
LAST VISIT:   7/28/2023     Future Appointments   Date Time Provider [address]   12/1/2023  8:30 AM Eddy Seip, MD [facility]

## 2023-11-27 RX ORDER — POTASSIUM CHLORIDE 20 MEQ/1
TABLET, EXTENDED RELEASE ORAL
Qty: 90 TABLET | Refills: 3 | Status: SHIPPED | OUTPATIENT
Start: 2023-11-27

## 2023-12-01 ENCOUNTER — OFFICE VISIT (OUTPATIENT)
Dept: PRIMARY CARE CLINIC | Age: 63
End: 2023-12-01
Payer: COMMERCIAL

## 2023-12-01 VITALS
BODY MASS INDEX: 37.19 KG/M2 | HEART RATE: 85 BPM | WEIGHT: 315 LBS | OXYGEN SATURATION: 98 % | DIASTOLIC BLOOD PRESSURE: 82 MMHG | SYSTOLIC BLOOD PRESSURE: 136 MMHG | HEIGHT: 77 IN

## 2023-12-01 DIAGNOSIS — E66.9 DIABETES MELLITUS TYPE 2 IN OBESE (HCC): ICD-10-CM

## 2023-12-01 DIAGNOSIS — I10 ESSENTIAL HYPERTENSION: ICD-10-CM

## 2023-12-01 DIAGNOSIS — E11.69 DIABETES MELLITUS TYPE 2 IN OBESE (HCC): ICD-10-CM

## 2023-12-01 DIAGNOSIS — R60.0 LOCALIZED EDEMA: ICD-10-CM

## 2023-12-01 DIAGNOSIS — Z23 NEED FOR VACCINATION: ICD-10-CM

## 2023-12-01 DIAGNOSIS — E11.42 DIABETIC POLYNEUROPATHY ASSOCIATED WITH TYPE 2 DIABETES MELLITUS (HCC): Primary | ICD-10-CM

## 2023-12-01 PROCEDURE — 3075F SYST BP GE 130 - 139MM HG: CPT | Performed by: FAMILY MEDICINE

## 2023-12-01 PROCEDURE — 3079F DIAST BP 80-89 MM HG: CPT | Performed by: FAMILY MEDICINE

## 2023-12-01 PROCEDURE — 3044F HG A1C LEVEL LT 7.0%: CPT | Performed by: FAMILY MEDICINE

## 2023-12-01 PROCEDURE — 99213 OFFICE O/P EST LOW 20 MIN: CPT | Performed by: FAMILY MEDICINE

## 2023-12-01 PROCEDURE — 90674 CCIIV4 VAC NO PRSV 0.5 ML IM: CPT | Performed by: FAMILY MEDICINE

## 2023-12-01 PROCEDURE — 90471 IMMUNIZATION ADMIN: CPT | Performed by: FAMILY MEDICINE

## 2023-12-01 ASSESSMENT — ENCOUNTER SYMPTOMS
ABDOMINAL PAIN: 0
SORE THROAT: 0
DIARRHEA: 0
NAUSEA: 0
RHINORRHEA: 0
WHEEZING: 0
EYE DISCHARGE: 0
EYE REDNESS: 0
COUGH: 0
SHORTNESS OF BREATH: 0
VOMITING: 0

## 2023-12-01 NOTE — PROGRESS NOTES
[address] [facility] PRIMARY CARE  [address] [address]  [address] [facility] [address]  Dept: [phone]    Fabián Paris is a 58 y.o. male Established patient, who presents today for his medical conditions/complaints as noted below. Chief Complaint   Patient presents with    Diabetes       HPI:     HPI  Patient here for diabetes follow-up. States has had occasional low blood sugar reactions. Denies any chest pain or shortness of breath. No lightheadedness or dizziness. Patient with history of bladder cancer. Patient states has been 2 or 3 years since his last cystoscopy. Patient is unsure when his next 1 supposed to be. Patient continues to have his diabetic neuropathy. Was recommended to have diabetic shoes due to increased risk of sores and skin breakdown.     Reviewed prior notes None  Reviewed previous Labs    LDL Cholesterol (mg/dL)   Date Value   07/25/2023 46   12/01/2021 108     LDL Calculated (mg/dL)   Date Value   06/11/2020 101   04/27/2018 104   09/23/2016 103       (goal LDL is <100)   AST (U/L)   Date Value   07/25/2023 29     ALT (U/L)   Date Value   07/25/2023 35     BUN (mg/dL)   Date Value   07/25/2023 15     Hemoglobin A1C (%)   Date Value   07/25/2023 5.5     TSH (uIU/mL)   Date Value   06/11/2020 1.84     BP Readings from Last 3 Encounters:   12/01/23 136/82   07/28/23 132/82   03/24/23 130/76          (goal 120/80)    Past Medical History:   Diagnosis Date    Arthritis     Asthma     Cancer ([facility]) 2012    bladder    Depression     Diabetes mellitus ([facility])     borderline    [place] (hard of hearing)     bilateral hearing aids    Hypertension     Obesity     Sleep apnea 09/20/22    SOB (shortness of breath) on exertion     Umbilical hernia     Wears glasses       Past Surgical History:   Procedure Laterality Date    COLONOSCOPY  2017    normal    CYSTOSCOPY  5/21/14    several    CYSTOSCOPY  8/21/14    CYSTOSCOPY  11-21-14    CYSTOSCOPY N/A 8/27/2019    CYSTOSCOPY

## 2023-12-05 DIAGNOSIS — E66.9 DIABETES MELLITUS TYPE 2 IN OBESE (HCC): ICD-10-CM

## 2023-12-05 DIAGNOSIS — E11.69 DIABETES MELLITUS TYPE 2 IN OBESE (HCC): ICD-10-CM

## 2023-12-06 LAB
ESTIMATED AVERAGE GLUCOSE: 114 MG/DL
HBA1C MFR BLD: 5.6 % (ref 4–6)

## 2023-12-23 DIAGNOSIS — I10 ESSENTIAL HYPERTENSION: ICD-10-CM

## 2023-12-26 RX ORDER — CARVEDILOL 25 MG/1
TABLET ORAL
Qty: 180 TABLET | Refills: 0 | Status: SHIPPED | OUTPATIENT
Start: 2023-12-26

## 2024-01-25 ENCOUNTER — PATIENT MESSAGE (OUTPATIENT)
Dept: PRIMARY CARE CLINIC | Age: 64
End: 2024-01-25

## 2024-01-26 NOTE — TELEPHONE ENCOUNTER
From: Fabián Downey  To: Dr. Nazario Issa  Sent: 1/25/2024 11:31 AM EST  Subject: CPAP    I will be changing to a new insurance provider on Feb 1 2024. I was told to have you send a new script for my CPAP to [facility] # [phone] FAX - [phone] so my supplies will continue uninterrupted. [facility] will contact [facility] and work out the details.   Thanks   Fabián Downey   1960   [address] [place] [address]

## 2024-02-20 RX ORDER — TAMSULOSIN HYDROCHLORIDE 0.4 MG/1
0.4 CAPSULE ORAL DAILY
Qty: 30 CAPSULE | Refills: 3 | Status: SHIPPED | OUTPATIENT
Start: 2024-02-20

## 2024-04-01 ASSESSMENT — PATIENT HEALTH QUESTIONNAIRE - PHQ9
1. LITTLE INTEREST OR PLEASURE IN DOING THINGS: SEVERAL DAYS
SUM OF ALL RESPONSES TO PHQ9 QUESTIONS 1 & 2: 2
SUM OF ALL RESPONSES TO PHQ9 QUESTIONS 1 & 2: 2
SUM OF ALL RESPONSES TO PHQ QUESTIONS 1-9: 2
SUM OF ALL RESPONSES TO PHQ QUESTIONS 1-9: 2
2. FEELING DOWN, DEPRESSED OR HOPELESS: SEVERAL DAYS
2. FEELING DOWN, DEPRESSED OR HOPELESS: SEVERAL DAYS
SUM OF ALL RESPONSES TO PHQ QUESTIONS 1-9: 2
1. LITTLE INTEREST OR PLEASURE IN DOING THINGS: SEVERAL DAYS
SUM OF ALL RESPONSES TO PHQ QUESTIONS 1-9: 2

## 2024-04-02 SDOH — ECONOMIC STABILITY: FOOD INSECURITY: WITHIN THE PAST 12 MONTHS, THE FOOD YOU BOUGHT JUST DIDN'T LAST AND YOU DIDN'T HAVE MONEY TO GET MORE.: NEVER TRUE

## 2024-04-02 SDOH — ECONOMIC STABILITY: FOOD INSECURITY: WITHIN THE PAST 12 MONTHS, YOU WORRIED THAT YOUR FOOD WOULD RUN OUT BEFORE YOU GOT MONEY TO BUY MORE.: NEVER TRUE

## 2024-04-02 SDOH — ECONOMIC STABILITY: INCOME INSECURITY: HOW HARD IS IT FOR YOU TO PAY FOR THE VERY BASICS LIKE FOOD, HOUSING, MEDICAL CARE, AND HEATING?: NOT HARD AT ALL

## 2024-04-05 ENCOUNTER — OFFICE VISIT (OUTPATIENT)
Dept: PRIMARY CARE CLINIC | Age: 64
End: 2024-04-05
Payer: COMMERCIAL

## 2024-04-05 VITALS
HEART RATE: 72 BPM | OXYGEN SATURATION: 97 % | WEIGHT: 315 LBS | SYSTOLIC BLOOD PRESSURE: 138 MMHG | BODY MASS INDEX: 37.19 KG/M2 | DIASTOLIC BLOOD PRESSURE: 80 MMHG | HEIGHT: 77 IN

## 2024-04-05 DIAGNOSIS — Z23 NEED FOR VIRAL IMMUNIZATION: ICD-10-CM

## 2024-04-05 DIAGNOSIS — E11.9 CONTROLLED TYPE 2 DIABETES MELLITUS WITHOUT COMPLICATION, WITHOUT LONG-TERM CURRENT USE OF INSULIN (HCC): Primary | ICD-10-CM

## 2024-04-05 LAB
CREATININE URINE POCT: NORMAL
HBA1C MFR BLD: 7.9 %
MICROALBUMIN/CREAT 24H UR: NORMAL MG/G{CREAT}
MICROALBUMIN/CREAT UR-RTO: NORMAL

## 2024-04-05 PROCEDURE — 90732 PPSV23 VACC 2 YRS+ SUBQ/IM: CPT | Performed by: FAMILY MEDICINE

## 2024-04-05 PROCEDURE — 82044 UR ALBUMIN SEMIQUANTITATIVE: CPT | Performed by: FAMILY MEDICINE

## 2024-04-05 PROCEDURE — 3075F SYST BP GE 130 - 139MM HG: CPT | Performed by: FAMILY MEDICINE

## 2024-04-05 PROCEDURE — 3079F DIAST BP 80-89 MM HG: CPT | Performed by: FAMILY MEDICINE

## 2024-04-05 PROCEDURE — 83036 HEMOGLOBIN GLYCOSYLATED A1C: CPT | Performed by: FAMILY MEDICINE

## 2024-04-05 PROCEDURE — 1123F ACP DISCUSS/DSCN MKR DOCD: CPT | Performed by: FAMILY MEDICINE

## 2024-04-05 PROCEDURE — 3051F HG A1C>EQUAL 7.0%<8.0%: CPT | Performed by: FAMILY MEDICINE

## 2024-04-05 PROCEDURE — 99213 OFFICE O/P EST LOW 20 MIN: CPT | Performed by: FAMILY MEDICINE

## 2024-04-05 PROCEDURE — G0009 ADMIN PNEUMOCOCCAL VACCINE: HCPCS | Performed by: FAMILY MEDICINE

## 2024-04-05 RX ORDER — SEMAGLUTIDE 1.34 MG/ML
1 INJECTION, SOLUTION SUBCUTANEOUS WEEKLY
Qty: 3 ML | Refills: 5 | Status: SHIPPED | OUTPATIENT
Start: 2024-04-05

## 2024-04-05 ASSESSMENT — ENCOUNTER SYMPTOMS
NAUSEA: 0
DIARRHEA: 0
VOMITING: 0
SORE THROAT: 0
WHEEZING: 0
SHORTNESS OF BREATH: 0
EYE DISCHARGE: 0
EYE REDNESS: 0
ABDOMINAL PAIN: 0
RHINORRHEA: 0
COUGH: 0

## 2024-04-05 NOTE — PROGRESS NOTES
YES Answers must have Comments  1. \"Have you been to the ER, urgent care clinic since your last visit?  Hospitalized since your last visit?\"    No    2. “Have you seen or consulted any other health care providers outside of [facility] since your last visit?”    Yes- virtual visit with NP through [facility] health insurance for well check starting on the insurance.    For questions 3-5, Nurse/CMA to request records if not in chart  3.  For patients aged 45-75: “Have you had a colorectal cancer screening such as a colonoscopy/FIT/Cologuard?    Yes- in chart    If the patient is female:  4. For female patients aged 40-74: “Have you had a mammogram?”   N/A    5.  For female patients aged 21-65: “Have you had a pap smear?”    N/A   
Dysfunction 10 tablet 5    albuterol sulfate HFA (PROAIR HFA) 108 (90 Base) MCG/ACT inhaler Inhale 2 puffs into the lungs every 6 hours as needed for Wheezing or Shortness of Breath 1 each 5    aspirin 81 MG tablet Take 1 tablet by mouth daily      Multiple Vitamins-Minerals (MULTIVITAMIN & MINERAL PO) Take 1 tablet by mouth daily.      atorvastatin (LIPITOR) 20 MG tablet Take 1 tablet by mouth nightly 90 tablet 3     No current facility-administered medications for this visit.     No Known Allergies    Health Maintenance   Topic Date Due    HIV screen  Never done    Respiratory Syncytial Virus (RSV) Pregnant or age 60 yrs+ (1 - 1-dose 60+ series) Never done    Diabetic retinal exam  05/04/2023    COVID-19 Vaccine (4 - 2023-24 season) 09/01/2023    Diabetic foot exam  12/16/2023    Pneumococcal 65+ years Vaccine (3 of 3 - PPSV23 or PCV20) 02/02/2024    AAA screen  Never done    Diabetic Alb to Cr ratio (uACR) test  03/23/2024    Lipids  07/25/2024    GFR test (Diabetes, CKD 3-4, OR last GFR 15-59)  07/25/2024    Depression Screen  04/01/2025    A1C test (Diabetic or Prediabetic)  04/05/2025    Colorectal Cancer Screen  10/13/2026    DTaP/Tdap/Td vaccine (4 - Td or Tdap) 05/07/2029    Flu vaccine  Completed    Shingles vaccine  Completed    Hepatitis C screen  Completed    Hepatitis A vaccine  Aged Out    Hepatitis B vaccine  Aged Out    Hib vaccine  Aged Out    Polio vaccine  Aged Out    Meningococcal (ACWY) vaccine  Aged Out    Pneumococcal 0-64 years Vaccine  Discontinued    Prostate Specific Antigen (PSA) Screening or Monitoring  Discontinued       Subjective:      Review of Systems   Constitutional:  Negative for chills and fever.   HENT:  Negative for rhinorrhea and sore throat.    Eyes:  Negative for discharge and redness.   Respiratory:  Negative for cough, shortness of breath and wheezing.    Cardiovascular:  Negative for chest pain and palpitations.   Gastrointestinal:  Negative for abdominal pain,

## 2024-04-06 DIAGNOSIS — I10 ESSENTIAL HYPERTENSION: ICD-10-CM

## 2024-04-08 RX ORDER — CARVEDILOL 25 MG/1
TABLET ORAL
Qty: 180 TABLET | Refills: 0 | Status: SHIPPED | OUTPATIENT
Start: 2024-04-08

## 2024-04-15 ENCOUNTER — PATIENT MESSAGE (OUTPATIENT)
Dept: PRIMARY CARE CLINIC | Age: 64
End: 2024-04-15

## 2024-04-15 DIAGNOSIS — E11.69 TYPE 2 DIABETES MELLITUS WITH OBESITY (HCC): ICD-10-CM

## 2024-04-15 DIAGNOSIS — E66.9 TYPE 2 DIABETES MELLITUS WITH OBESITY (HCC): ICD-10-CM

## 2024-04-15 RX ORDER — LANCETS 30 GAUGE
1 EACH MISCELLANEOUS DAILY
Qty: 100 EACH | Refills: 3 | Status: SHIPPED | OUTPATIENT
Start: 2024-04-15

## 2024-04-15 NOTE — TELEPHONE ENCOUNTER
From: Fabián Downey  To: Dr. Nazario Issa  Sent: 4/15/2024 10:18 AM EDT  Subject: Testing supplies     I need test strips and Lancets for a one touch verio flex  Thank you

## 2024-04-29 DIAGNOSIS — E11.69 TYPE 2 DIABETES MELLITUS WITH OBESITY (HCC): ICD-10-CM

## 2024-04-29 DIAGNOSIS — E66.9 TYPE 2 DIABETES MELLITUS WITH OBESITY (HCC): ICD-10-CM

## 2024-04-29 RX ORDER — ATORVASTATIN CALCIUM 20 MG/1
20 TABLET, FILM COATED ORAL NIGHTLY
Qty: 90 TABLET | Refills: 3 | Status: SHIPPED | OUTPATIENT
Start: 2024-04-29 | End: 2025-04-24

## 2024-07-07 DIAGNOSIS — I10 ESSENTIAL HYPERTENSION: ICD-10-CM

## 2024-07-08 RX ORDER — CARVEDILOL 25 MG/1
TABLET ORAL
Qty: 180 TABLET | Refills: 0 | Status: SHIPPED | OUTPATIENT
Start: 2024-07-08

## 2024-07-10 RX ORDER — TAMSULOSIN HYDROCHLORIDE 0.4 MG/1
0.4 CAPSULE ORAL DAILY
Qty: 30 CAPSULE | Refills: 3 | Status: SHIPPED | OUTPATIENT
Start: 2024-07-10

## 2024-07-18 RX ORDER — TAMSULOSIN HYDROCHLORIDE 0.4 MG/1
0.4 CAPSULE ORAL DAILY
Qty: 90 CAPSULE | Refills: 3 | Status: SHIPPED | OUTPATIENT
Start: 2024-07-18

## 2024-08-11 DIAGNOSIS — E11.69 TYPE 2 DIABETES MELLITUS WITH OBESITY (HCC): ICD-10-CM

## 2024-08-11 DIAGNOSIS — E66.9 TYPE 2 DIABETES MELLITUS WITH OBESITY (HCC): ICD-10-CM

## 2024-08-12 RX ORDER — FUROSEMIDE 40 MG/1
TABLET ORAL
Qty: 180 TABLET | Refills: 3 | Status: SHIPPED | OUTPATIENT
Start: 2024-08-12

## 2024-08-16 ENCOUNTER — OFFICE VISIT (OUTPATIENT)
Dept: PRIMARY CARE CLINIC | Age: 64
End: 2024-08-16
Payer: COMMERCIAL

## 2024-08-16 VITALS
BODY MASS INDEX: 37.19 KG/M2 | DIASTOLIC BLOOD PRESSURE: 80 MMHG | HEIGHT: 77 IN | OXYGEN SATURATION: 96 % | WEIGHT: 315 LBS | SYSTOLIC BLOOD PRESSURE: 130 MMHG | HEART RATE: 76 BPM

## 2024-08-16 DIAGNOSIS — Z13.6 ENCOUNTER FOR LIPID SCREENING FOR CARDIOVASCULAR DISEASE: ICD-10-CM

## 2024-08-16 DIAGNOSIS — Z13.220 ENCOUNTER FOR LIPID SCREENING FOR CARDIOVASCULAR DISEASE: ICD-10-CM

## 2024-08-16 DIAGNOSIS — Z00.00 WELCOME TO MEDICARE PREVENTIVE VISIT: ICD-10-CM

## 2024-08-16 DIAGNOSIS — Z00.00 ANNUAL PHYSICAL EXAM: ICD-10-CM

## 2024-08-16 DIAGNOSIS — Z85.51 HISTORY OF BLADDER CANCER: ICD-10-CM

## 2024-08-16 DIAGNOSIS — E11.9 CONTROLLED TYPE 2 DIABETES MELLITUS WITHOUT COMPLICATION, WITHOUT LONG-TERM CURRENT USE OF INSULIN (HCC): Primary | ICD-10-CM

## 2024-08-16 DIAGNOSIS — I10 ESSENTIAL HYPERTENSION: ICD-10-CM

## 2024-08-16 DIAGNOSIS — Z12.5 SCREENING PSA (PROSTATE SPECIFIC ANTIGEN): ICD-10-CM

## 2024-08-16 PROBLEM — E66.9 DIABETES MELLITUS TYPE 2 IN OBESE: Status: RESOLVED | Noted: 2018-11-05 | Resolved: 2024-08-16

## 2024-08-16 PROBLEM — E11.69 DIABETES MELLITUS TYPE 2 IN OBESE: Status: RESOLVED | Noted: 2018-11-05 | Resolved: 2024-08-16

## 2024-08-16 LAB — HBA1C MFR BLD: 6.3 %

## 2024-08-16 PROCEDURE — 1123F ACP DISCUSS/DSCN MKR DOCD: CPT | Performed by: FAMILY MEDICINE

## 2024-08-16 PROCEDURE — 83036 HEMOGLOBIN GLYCOSYLATED A1C: CPT | Performed by: FAMILY MEDICINE

## 2024-08-16 PROCEDURE — 3075F SYST BP GE 130 - 139MM HG: CPT | Performed by: FAMILY MEDICINE

## 2024-08-16 PROCEDURE — 3044F HG A1C LEVEL LT 7.0%: CPT | Performed by: FAMILY MEDICINE

## 2024-08-16 PROCEDURE — G0402 INITIAL PREVENTIVE EXAM: HCPCS | Performed by: FAMILY MEDICINE

## 2024-08-16 PROCEDURE — 3079F DIAST BP 80-89 MM HG: CPT | Performed by: FAMILY MEDICINE

## 2024-08-16 RX ORDER — CARVEDILOL 25 MG/1
25 TABLET ORAL 2 TIMES DAILY WITH MEALS
Qty: 180 TABLET | Refills: 0 | Status: SHIPPED | OUTPATIENT
Start: 2024-08-16

## 2024-08-16 RX ORDER — POTASSIUM CHLORIDE 20 MEQ/1
20 TABLET, EXTENDED RELEASE ORAL DAILY
Qty: 90 TABLET | Refills: 3 | Status: SHIPPED | OUTPATIENT
Start: 2024-08-16

## 2024-08-16 ASSESSMENT — PATIENT HEALTH QUESTIONNAIRE - PHQ9
2. FEELING DOWN, DEPRESSED OR HOPELESS: NOT AT ALL
SUM OF ALL RESPONSES TO PHQ9 QUESTIONS 1 & 2: 0
SUM OF ALL RESPONSES TO PHQ QUESTIONS 1-9: 0
1. LITTLE INTEREST OR PLEASURE IN DOING THINGS: NOT AT ALL
SUM OF ALL RESPONSES TO PHQ QUESTIONS 1-9: 0

## 2024-08-16 ASSESSMENT — LIFESTYLE VARIABLES
HOW MANY STANDARD DRINKS CONTAINING ALCOHOL DO YOU HAVE ON A TYPICAL DAY: 1 OR 2
HOW OFTEN DO YOU HAVE A DRINK CONTAINING ALCOHOL: MONTHLY OR LESS

## 2024-08-16 NOTE — PROGRESS NOTES
Medicare Annual Wellness Visit    Fabián Downey is here for Medicare AWV    Assessment & Plan   Controlled type 2 diabetes mellitus without complication, without long-term current use of insulin (HCC)  -     POCT glycosylated hemoglobin (Hb A1C)  History of bladder cancer  Encounter for lipid screening for cardiovascular disease  -     Lipid, Fasting; Future  Annual physical exam  -     Basic Metabolic Panel, Fasting; Future  -     Hepatic Function Panel; Future  Screening PSA (prostate specific antigen)  -     PSA Screening; Future  Essential hypertension  -     carvedilol (COREG) 25 MG tablet; Take 1 tablet by mouth 2 times daily (with meals), Disp-180 tablet, R-0Normal  Welcome to Medicare preventive visit    Recommendations for Preventive Services Due: see orders and patient instructions/AVS.  Recommended screening schedule for the next 5-10 years is provided to the patient in written form: see Patient Instructions/AVS.     Return in about 4 months (around 12/16/2024) for DIABETES.     Subjective   The following acute and/or chronic problems were also addressed today:  niddm    Patient's complete Health Risk Assessment and screening values have been reviewed and are found in Flowsheets. The following problems were reviewed today and where indicated follow up appointments were made and/or referrals ordered.    Positive Risk Factor Screenings with Interventions:       Cognitive:   Clock Drawing Test (CDT): (!) Abnormal  Words recalled: 3 Words Recalled  Total Score: 3  Total Score Interpretation: Normal Mini-Cog  Interventions:  Patient declines any further evaluation or treatment            Inactivity:  On average, how many days per week do you engage in moderate to strenuous exercise (like a brisk walk)?: 1 day (!) Abnormal  On average, how many minutes do you engage in exercise at this level?: 20 min  Interventions:  Patient declined any further interventions or treatment     Abnormal BMI (obese):  Body mass

## 2024-09-09 DIAGNOSIS — Z13.220 ENCOUNTER FOR LIPID SCREENING FOR CARDIOVASCULAR DISEASE: ICD-10-CM

## 2024-09-09 DIAGNOSIS — Z00.00 ANNUAL PHYSICAL EXAM: ICD-10-CM

## 2024-09-09 DIAGNOSIS — Z12.5 SCREENING PSA (PROSTATE SPECIFIC ANTIGEN): ICD-10-CM

## 2024-09-09 DIAGNOSIS — Z13.6 ENCOUNTER FOR LIPID SCREENING FOR CARDIOVASCULAR DISEASE: ICD-10-CM

## 2024-09-09 LAB
ALBUMIN/GLOBULIN RATIO: 1 (ref 1–2.5)
ALBUMIN: 4.1 G/DL (ref 3.5–5.2)
ALP BLD-CCNC: 78 U/L (ref 40–129)
ALT SERPL-CCNC: 40 U/L (ref 10–50)
ANION GAP SERPL CALCULATED.3IONS-SCNC: 9 MMOL/L (ref 9–16)
AST SERPL-CCNC: 32 U/L (ref 10–50)
BILIRUB SERPL-MCNC: 0.4 MG/DL (ref 0–1.2)
BILIRUBIN DIRECT: 0.2 MG/DL (ref 0–0.2)
BILIRUBIN, INDIRECT: 0.2 MG/DL (ref 0–1)
BUN BLDV-MCNC: 14 MG/DL (ref 8–23)
CALCIUM SERPL-MCNC: 9.2 MG/DL (ref 8.6–10.4)
CHLORIDE BLD-SCNC: 104 MMOL/L (ref 98–107)
CHOLESTEROL, FASTING: 121 MG/DL (ref 0–199)
CHOLESTEROL/HDL RATIO: 3
CO2: 27 MMOL/L (ref 20–31)
CREAT SERPL-MCNC: 0.9 MG/DL (ref 0.7–1.2)
GFR, ESTIMATED: >90 ML/MIN/1.73M2
GLOBULIN: 3.3 G/DL
GLUCOSE FASTING: 151 MG/DL (ref 74–99)
HDLC SERPL-MCNC: 48 MG/DL
LDL CHOLESTEROL: 55 MG/DL (ref 0–100)
POTASSIUM SERPL-SCNC: 4.7 MMOL/L (ref 3.7–5.3)
PROSTATE SPECIFIC ANTIGEN: 0.7 NG/ML (ref 0–4)
SODIUM BLD-SCNC: 140 MMOL/L (ref 136–145)
TOTAL PROTEIN: 7.4 G/DL (ref 6.6–8.7)
TRIGLYCERIDE, FASTING: 93 MG/DL (ref 0–149)
VLDLC SERPL CALC-MCNC: 19 MG/DL

## 2024-09-25 ENCOUNTER — TELEPHONE (OUTPATIENT)
Dept: PRIMARY CARE CLINIC | Age: 64
End: 2024-09-25

## 2024-10-08 DIAGNOSIS — I10 ESSENTIAL HYPERTENSION: ICD-10-CM

## 2024-10-08 RX ORDER — CARVEDILOL 25 MG/1
25 TABLET ORAL 2 TIMES DAILY WITH MEALS
Qty: 180 TABLET | Refills: 0 | Status: SHIPPED | OUTPATIENT
Start: 2024-10-08

## 2024-10-22 ENCOUNTER — TELEPHONE (OUTPATIENT)
Dept: PRIMARY CARE CLINIC | Age: 64
End: 2024-10-22

## 2024-12-20 ENCOUNTER — OFFICE VISIT (OUTPATIENT)
Dept: PRIMARY CARE CLINIC | Age: 64
End: 2024-12-20

## 2024-12-20 VITALS
HEIGHT: 77 IN | WEIGHT: 315 LBS | DIASTOLIC BLOOD PRESSURE: 84 MMHG | BODY MASS INDEX: 37.19 KG/M2 | SYSTOLIC BLOOD PRESSURE: 136 MMHG | HEART RATE: 74 BPM | OXYGEN SATURATION: 97 %

## 2024-12-20 DIAGNOSIS — E11.9 CONTROLLED TYPE 2 DIABETES MELLITUS WITHOUT COMPLICATION, WITHOUT LONG-TERM CURRENT USE OF INSULIN (HCC): Primary | ICD-10-CM

## 2024-12-20 DIAGNOSIS — J45.20 MILD INTERMITTENT ASTHMA WITHOUT COMPLICATION: ICD-10-CM

## 2024-12-20 DIAGNOSIS — I10 ESSENTIAL HYPERTENSION: ICD-10-CM

## 2024-12-20 DIAGNOSIS — E11.42 DIABETIC POLYNEUROPATHY ASSOCIATED WITH TYPE 2 DIABETES MELLITUS (HCC): ICD-10-CM

## 2024-12-20 DIAGNOSIS — Z13.6 ENCOUNTER FOR ABDOMINAL AORTIC ANEURYSM (AAA) SCREENING: ICD-10-CM

## 2024-12-20 DIAGNOSIS — Z23 NEED FOR VIRAL IMMUNIZATION: ICD-10-CM

## 2024-12-20 LAB — HBA1C MFR BLD: 8.6 %

## 2024-12-20 ASSESSMENT — ENCOUNTER SYMPTOMS
ABDOMINAL PAIN: 0
SHORTNESS OF BREATH: 0
NAUSEA: 0
WHEEZING: 0
SORE THROAT: 0
DIARRHEA: 0
EYE DISCHARGE: 0
EYE REDNESS: 0
RHINORRHEA: 0
COUGH: 0
VOMITING: 0

## 2024-12-20 NOTE — PROGRESS NOTES
prescribed medications.  All patient questions answered. Pt voiced understanding. Reviewed health maintenance.  Instructed to continue current medications, diet and exercise.  Patient agreed with treatment plan. Follow up as directed.     Electronically signed by Nazario Issa MD on 12/20/2024 at 9:55 AM

## 2024-12-23 ENCOUNTER — TELEPHONE (OUTPATIENT)
Dept: PRIMARY CARE CLINIC | Age: 64
End: 2024-12-23

## 2024-12-26 DIAGNOSIS — E11.9 CONTROLLED TYPE 2 DIABETES MELLITUS WITHOUT COMPLICATION, WITHOUT LONG-TERM CURRENT USE OF INSULIN (HCC): ICD-10-CM

## 2024-12-26 RX ORDER — SEMAGLUTIDE 1.34 MG/ML
1 INJECTION, SOLUTION SUBCUTANEOUS WEEKLY
Qty: 9 ML | Refills: 3 | Status: SHIPPED | OUTPATIENT
Start: 2024-12-26

## 2025-01-06 ENCOUNTER — HOSPITAL ENCOUNTER (OUTPATIENT)
Dept: ULTRASOUND IMAGING | Age: 65
Discharge: HOME | End: 2025-01-08
Payer: COMMERCIAL

## 2025-01-06 DIAGNOSIS — Z13.6 ENCOUNTER FOR ABDOMINAL AORTIC ANEURYSM (AAA) SCREENING: ICD-10-CM

## 2025-01-06 PROCEDURE — 76706 US ABDL AORTA SCREEN AAA: CPT

## 2025-01-07 DIAGNOSIS — I10 ESSENTIAL HYPERTENSION: ICD-10-CM

## 2025-01-07 RX ORDER — CARVEDILOL 25 MG/1
25 TABLET ORAL 2 TIMES DAILY WITH MEALS
Qty: 180 TABLET | Refills: 0 | Status: SHIPPED | OUTPATIENT
Start: 2025-01-07

## 2025-03-21 DIAGNOSIS — J20.9 ACUTE BRONCHITIS, UNSPECIFIED ORGANISM: ICD-10-CM

## 2025-03-21 RX ORDER — ALBUTEROL SULFATE 90 UG/1
2 INHALANT RESPIRATORY (INHALATION) EVERY 6 HOURS PRN
Qty: 1 EACH | Refills: 5 | Status: SHIPPED | OUTPATIENT
Start: 2025-03-21

## 2025-04-05 DIAGNOSIS — I10 ESSENTIAL HYPERTENSION: ICD-10-CM

## 2025-04-07 RX ORDER — CARVEDILOL 25 MG/1
25 TABLET ORAL 2 TIMES DAILY WITH MEALS
Qty: 180 TABLET | Refills: 0 | OUTPATIENT
Start: 2025-04-07

## 2025-04-07 RX ORDER — CARVEDILOL 25 MG/1
25 TABLET ORAL 2 TIMES DAILY WITH MEALS
Qty: 180 TABLET | Refills: 0 | Status: SHIPPED | OUTPATIENT
Start: 2025-04-07

## 2025-04-14 NOTE — TELEPHONE ENCOUNTER
Refill sent to pharmacy on 2/20 for 90 days but for the incorrect dosage and patient has been out of the medication. Patient states the order was sent over to take 1 tablet daily but patient has always taken BID.    -Please advise  Pharmacy confirmed

## 2025-04-22 SDOH — HEALTH STABILITY: PHYSICAL HEALTH: ON AVERAGE, HOW MANY DAYS PER WEEK DO YOU ENGAGE IN MODERATE TO STRENUOUS EXERCISE (LIKE A BRISK WALK)?: 0 DAYS

## 2025-04-22 SDOH — ECONOMIC STABILITY: INCOME INSECURITY: IN THE LAST 12 MONTHS, WAS THERE A TIME WHEN YOU WERE NOT ABLE TO PAY THE MORTGAGE OR RENT ON TIME?: NO

## 2025-04-22 SDOH — ECONOMIC STABILITY: FOOD INSECURITY: WITHIN THE PAST 12 MONTHS, THE FOOD YOU BOUGHT JUST DIDN'T LAST AND YOU DIDN'T HAVE MONEY TO GET MORE.: NEVER TRUE

## 2025-04-22 SDOH — HEALTH STABILITY: PHYSICAL HEALTH: ON AVERAGE, HOW MANY MINUTES DO YOU ENGAGE IN EXERCISE AT THIS LEVEL?: 0 MIN

## 2025-04-22 SDOH — ECONOMIC STABILITY: FOOD INSECURITY: WITHIN THE PAST 12 MONTHS, YOU WORRIED THAT YOUR FOOD WOULD RUN OUT BEFORE YOU GOT MONEY TO BUY MORE.: NEVER TRUE

## 2025-04-22 SDOH — ECONOMIC STABILITY: TRANSPORTATION INSECURITY
IN THE PAST 12 MONTHS, HAS THE LACK OF TRANSPORTATION KEPT YOU FROM MEDICAL APPOINTMENTS OR FROM GETTING MEDICATIONS?: NO

## 2025-04-22 ASSESSMENT — LIFESTYLE VARIABLES
HOW MANY STANDARD DRINKS CONTAINING ALCOHOL DO YOU HAVE ON A TYPICAL DAY: 0
HOW OFTEN DO YOU HAVE SIX OR MORE DRINKS ON ONE OCCASION: 1
HOW OFTEN DO YOU HAVE A DRINK CONTAINING ALCOHOL: 2
HOW OFTEN DO YOU HAVE A DRINK CONTAINING ALCOHOL: MONTHLY OR LESS
HOW MANY STANDARD DRINKS CONTAINING ALCOHOL DO YOU HAVE ON A TYPICAL DAY: PATIENT DOES NOT DRINK

## 2025-04-22 ASSESSMENT — PATIENT HEALTH QUESTIONNAIRE - PHQ9
SUM OF ALL RESPONSES TO PHQ QUESTIONS 1-9: 0
1. LITTLE INTEREST OR PLEASURE IN DOING THINGS: NOT AT ALL
2. FEELING DOWN, DEPRESSED OR HOPELESS: NOT AT ALL

## 2025-04-25 ENCOUNTER — OFFICE VISIT (OUTPATIENT)
Dept: PRIMARY CARE CLINIC | Age: 65
End: 2025-04-25
Payer: COMMERCIAL

## 2025-04-25 VITALS
DIASTOLIC BLOOD PRESSURE: 84 MMHG | HEIGHT: 77 IN | BODY MASS INDEX: 37.19 KG/M2 | OXYGEN SATURATION: 98 % | WEIGHT: 315 LBS | SYSTOLIC BLOOD PRESSURE: 128 MMHG | HEART RATE: 76 BPM

## 2025-04-25 DIAGNOSIS — Z00.00 INITIAL MEDICARE ANNUAL WELLNESS VISIT: Primary | ICD-10-CM

## 2025-04-25 DIAGNOSIS — E11.9 CONTROLLED TYPE 2 DIABETES MELLITUS WITHOUT COMPLICATION, WITHOUT LONG-TERM CURRENT USE OF INSULIN (HCC): ICD-10-CM

## 2025-04-25 LAB — HBA1C MFR BLD: 8.3 %

## 2025-04-25 PROCEDURE — 3079F DIAST BP 80-89 MM HG: CPT | Performed by: FAMILY MEDICINE

## 2025-04-25 PROCEDURE — 1160F RVW MEDS BY RX/DR IN RCRD: CPT | Performed by: FAMILY MEDICINE

## 2025-04-25 PROCEDURE — 1159F MED LIST DOCD IN RCRD: CPT | Performed by: FAMILY MEDICINE

## 2025-04-25 PROCEDURE — G0438 PPPS, INITIAL VISIT: HCPCS | Performed by: FAMILY MEDICINE

## 2025-04-25 PROCEDURE — 3074F SYST BP LT 130 MM HG: CPT | Performed by: FAMILY MEDICINE

## 2025-04-25 PROCEDURE — 83036 HEMOGLOBIN GLYCOSYLATED A1C: CPT | Performed by: FAMILY MEDICINE

## 2025-04-25 PROCEDURE — 1123F ACP DISCUSS/DSCN MKR DOCD: CPT | Performed by: FAMILY MEDICINE

## 2025-04-25 RX ORDER — SEMAGLUTIDE 1.34 MG/ML
1 INJECTION, SOLUTION SUBCUTANEOUS WEEKLY
Qty: 3 ADJUSTABLE DOSE PRE-FILLED PEN SYRINGE | Refills: 3 | Status: SHIPPED | OUTPATIENT
Start: 2025-04-25

## 2025-04-25 NOTE — PATIENT INSTRUCTIONS
Learning About Being Active as an Older Adult  Why is being active important as you get older?     Being active is one of the best things you can do for your health. And it's never too late to start. Being active--or getting active, if you aren't already--has definite benefits. It can:  Give you more energy,  Keep your mind sharp.  Improve balance to reduce your risk of falls.  Help you manage chronic illness with fewer medicines.  No matter how old you are, how fit you are, or what health problems you have, there is a form of activity that will work for you. And the more physical activity you can do, the better your overall health will be.  What kinds of activity can help you stay healthy?  Being more active will make your daily activities easier. Physical activity includes planned exercise and things you do in daily life. There are four types of activity:  Aerobic.  Doing aerobic activity makes your heart and lungs strong.  Includes walking, dancing, and gardening.  Aim for at least 2½ hours spread throughout the week.  It improves your energy and can help you sleep better.  Muscle-strengthening.  This type of activity can help maintain muscle and strengthen bones.  Includes climbing stairs, using resistance bands, and lifting or carrying heavy loads.  Aim for at least twice a week.  It can help protect the knees and other joints.  Stretching.  Stretching gives you better range of motion in joints and muscles.  Includes upper arm stretches, calf stretches, and gentle yoga.  Aim for at least twice a week, preferably after your muscles are warmed up from other activities.  It can help you function better in daily life.  Balancing.  This helps you stay coordinated and have good posture.  Includes heel-to-toe walking, julianna chi, and certain types of yoga.  Aim for at least 3 days a week.  It can reduce your risk of falling.  Even if you have a hard time meeting the recommendations, it's better to be more active

## 2025-04-25 NOTE — PROGRESS NOTES
Medicare Annual Wellness Visit    Fabián LEESA Downey is here for Medicare AWV and Diabetes    Assessment & Plan  1. Diabetes mellitus.  - Blood glucose levels have been consistently elevated, reaching into the 200s.  - Last recorded A1c level was 8.6.  - Plans to work through insurance issues to get back on Ozempic; has not been using it for months.  - Prescription for Ozempic 1 mg will be sent to [facility]'s pharmacy; if blood glucose levels do not improve, dosage may be increased to 2 mg.    2. Health maintenance.  - Due for a urine test for protein to monitor kidney function due to diabetes.  - Up to date on colonoscopy until next year and cholesterol until 09/2025.  - Completed shingles vaccine, tetanus good until 2029, and has had pneumonia and influenza vaccines.    3. Unspecified genitourinary issue.  - Currently seeking a new urologist due to a change in insurance plan.  - Reports no hematuria or other urinary issues.  - Referral to a urologist will be provided once a new doctor is found.  Initial Medicare annual wellness visit  Controlled type 2 diabetes mellitus without complication, without long-term current use of insulin ([facility])  -     POCT glycosylated hemoglobin (Hb A1C)  -     Albumin/Creatinine Ratio, Urine; Future  -     Semaglutide, 1 MG/DOSE, (OZEMPIC, 1 MG/DOSE,) 4 MG/3ML SOPN sc injection; Inject 1 mg into the skin once a week, Disp-3 Adjustable Dose Pre-filled Pen Syringe, R-3Normal    Results  Labs   - A1c: 8.6     Return in about 4 months (around 8/25/2025).     Subjective   The following acute and/or chronic problems were also addressed today:  NIDDM  History of Present Illness  The patient is a 65-year-old male who presents for evaluation of diabetes.    Suboptimal control of diabetes is reported, with blood glucose levels consistently in the 200s. This is attributed to a lack of physical activity, and no exercise for weight loss has been undertaken. Ozempic has not been obtained for several months

## 2025-05-04 DIAGNOSIS — E11.69 TYPE 2 DIABETES MELLITUS WITH OBESITY (HCC): ICD-10-CM

## 2025-05-04 DIAGNOSIS — E66.9 TYPE 2 DIABETES MELLITUS WITH OBESITY (HCC): ICD-10-CM

## 2025-05-05 RX ORDER — ATORVASTATIN CALCIUM 20 MG/1
20 TABLET, FILM COATED ORAL NIGHTLY
Qty: 90 TABLET | Refills: 0 | Status: SHIPPED | OUTPATIENT
Start: 2025-05-05

## 2025-07-03 DIAGNOSIS — I10 ESSENTIAL HYPERTENSION: ICD-10-CM

## 2025-07-03 RX ORDER — CARVEDILOL 25 MG/1
25 TABLET ORAL 2 TIMES DAILY WITH MEALS
Qty: 180 TABLET | Refills: 0 | Status: SHIPPED | OUTPATIENT
Start: 2025-07-03

## 2025-08-03 DIAGNOSIS — E66.9 TYPE 2 DIABETES MELLITUS WITH OBESITY (HCC): ICD-10-CM

## 2025-08-03 DIAGNOSIS — E11.69 TYPE 2 DIABETES MELLITUS WITH OBESITY (HCC): ICD-10-CM

## 2025-08-04 RX ORDER — ATORVASTATIN CALCIUM 20 MG/1
20 TABLET, FILM COATED ORAL NIGHTLY
Qty: 90 TABLET | Refills: 0 | Status: SHIPPED | OUTPATIENT
Start: 2025-08-04

## 2025-08-05 RX ORDER — POTASSIUM CHLORIDE 1500 MG/1
20 TABLET, EXTENDED RELEASE ORAL DAILY
Qty: 90 TABLET | Refills: 0 | Status: SHIPPED | OUTPATIENT
Start: 2025-08-05

## 2025-08-05 RX ORDER — TAMSULOSIN HYDROCHLORIDE 0.4 MG/1
CAPSULE ORAL DAILY
Qty: 90 CAPSULE | Refills: 0 | Status: SHIPPED | OUTPATIENT
Start: 2025-08-05

## 2025-08-23 DIAGNOSIS — E11.69 TYPE 2 DIABETES MELLITUS WITH OBESITY (HCC): ICD-10-CM

## 2025-08-23 DIAGNOSIS — E66.9 TYPE 2 DIABETES MELLITUS WITH OBESITY (HCC): ICD-10-CM

## 2025-08-25 RX ORDER — FUROSEMIDE 40 MG/1
TABLET ORAL
Qty: 180 TABLET | Refills: 0 | Status: SHIPPED | OUTPATIENT
Start: 2025-08-25 | End: 2025-08-29

## 2025-08-29 ENCOUNTER — HOSPITAL ENCOUNTER (OUTPATIENT)
Age: 65
Setting detail: SPECIMEN
Discharge: HOME | End: 2025-08-29

## 2025-08-29 ENCOUNTER — OFFICE VISIT (OUTPATIENT)
Dept: PRIMARY CARE CLINIC | Age: 65
End: 2025-08-29

## 2025-08-29 VITALS
HEIGHT: 77 IN | BODY MASS INDEX: 37.19 KG/M2 | SYSTOLIC BLOOD PRESSURE: 136 MMHG | OXYGEN SATURATION: 97 % | WEIGHT: 315 LBS | HEART RATE: 86 BPM | DIASTOLIC BLOOD PRESSURE: 82 MMHG

## 2025-08-29 DIAGNOSIS — E11.9 CONTROLLED TYPE 2 DIABETES MELLITUS WITHOUT COMPLICATION, WITHOUT LONG-TERM CURRENT USE OF INSULIN (HCC): ICD-10-CM

## 2025-08-29 DIAGNOSIS — E11.9 CONTROLLED TYPE 2 DIABETES MELLITUS WITHOUT COMPLICATION, WITHOUT LONG-TERM CURRENT USE OF INSULIN (HCC): Primary | ICD-10-CM

## 2025-08-29 DIAGNOSIS — E11.69 TYPE 2 DIABETES MELLITUS WITH OBESITY (HCC): ICD-10-CM

## 2025-08-29 DIAGNOSIS — I10 ESSENTIAL HYPERTENSION: ICD-10-CM

## 2025-08-29 DIAGNOSIS — Z00.00 ANNUAL PHYSICAL EXAM: ICD-10-CM

## 2025-08-29 DIAGNOSIS — E66.9 TYPE 2 DIABETES MELLITUS WITH OBESITY (HCC): ICD-10-CM

## 2025-08-29 DIAGNOSIS — Z12.5 SCREENING PSA (PROSTATE SPECIFIC ANTIGEN): ICD-10-CM

## 2025-08-29 DIAGNOSIS — R79.9 ABNORMAL BLOOD CHEMISTRY: ICD-10-CM

## 2025-08-29 LAB
CREAT UR-MCNC: 228 MG/DL (ref 39–259)
HBA1C MFR BLD: 6 %
MICROALBUMIN UR-MCNC: 21 MG/L (ref 0–20)
MICROALBUMIN/CREAT UR-RTO: 9 MCG/MG CREAT (ref 0–17)

## 2025-08-29 RX ORDER — FUROSEMIDE 40 MG/1
80 TABLET ORAL DAILY
Qty: 90 TABLET | Refills: 3 | Status: SHIPPED | OUTPATIENT
Start: 2025-08-29

## 2025-08-29 RX ORDER — CARVEDILOL 25 MG/1
25 TABLET ORAL 2 TIMES DAILY WITH MEALS
Qty: 180 TABLET | Refills: 3 | Status: SHIPPED | OUTPATIENT
Start: 2025-08-29

## 2025-08-29 RX ORDER — ATORVASTATIN CALCIUM 20 MG/1
20 TABLET, FILM COATED ORAL NIGHTLY
Qty: 90 TABLET | Refills: 3 | Status: SHIPPED | OUTPATIENT
Start: 2025-08-29

## 2025-08-29 RX ORDER — TAMSULOSIN HYDROCHLORIDE 0.4 MG/1
0.4 CAPSULE ORAL DAILY
Qty: 90 CAPSULE | Refills: 3 | Status: SHIPPED | OUTPATIENT
Start: 2025-08-29

## 2025-08-29 RX ORDER — POTASSIUM CHLORIDE 1500 MG/1
20 TABLET, EXTENDED RELEASE ORAL DAILY
Qty: 90 TABLET | Refills: 3 | Status: SHIPPED | OUTPATIENT
Start: 2025-08-29

## 2025-08-29 ASSESSMENT — ENCOUNTER SYMPTOMS
SHORTNESS OF BREATH: 0
EYE DISCHARGE: 0
EYE REDNESS: 0
SORE THROAT: 0
VOMITING: 0
DIARRHEA: 0
ABDOMINAL PAIN: 0
NAUSEA: 0
WHEEZING: 0
COUGH: 0
RHINORRHEA: 0

## 2025-08-29 ASSESSMENT — PATIENT HEALTH QUESTIONNAIRE - PHQ9
SUM OF ALL RESPONSES TO PHQ QUESTIONS 1-9: 0
2. FEELING DOWN, DEPRESSED OR HOPELESS: NOT AT ALL
1. LITTLE INTEREST OR PLEASURE IN DOING THINGS: NOT AT ALL
SUM OF ALL RESPONSES TO PHQ QUESTIONS 1-9: 0

## (undated) DEVICE — ST CHARLES CYSTO PACK: Brand: MEDLINE INDUSTRIES, INC.

## (undated) DEVICE — SUTURE ETHBND EXCEL SZ 0 L18IN NONABSORBABLE GRN L26MM MO-6 CX45D

## (undated) DEVICE — SHEET, T, LAPAROTOMY, STERILE: Brand: MEDLINE

## (undated) DEVICE — Y-TYPE TUR/BLADDER IRRIGATION SET, REGULATING CLAMP

## (undated) DEVICE — Z INACTIVE USE 2660664 SOLUTION IRRIG 3000ML 0.9% SOD CHL USP UROMATIC PLAS CONT

## (undated) DEVICE — STRIP,CLOSURE,WOUND,MEDI-STRIP,1/2X4: Brand: MEDLINE

## (undated) DEVICE — TUBING, SUCTION, 3/16" X 10', STRAIGHT: Brand: MEDLINE

## (undated) DEVICE — Z DISCONTINUED GLOVE SURG SZ 7.5 L12IN FNGR THK13MIL WHT ISOLEX

## (undated) DEVICE — Z INACTIVE USE 2635503 SOLUTION IRRIG 3000ML ST H2O USP UROMATIC PLAS CONT

## (undated) DEVICE — GLOVE SURG BEAD CUF 7 STD PF WHT STRL TRIUMPH LT LTX

## (undated) DEVICE — Z DUP USE 2522782 SOLUTION IRRIG 1000ML STRL H2O PLAS CONTAINER UROMATIC

## (undated) DEVICE — SUTURE PERMAHAND SZ 0 L30IN NONABSORBABLE BLK FSL L30MM 3/8 680H

## (undated) DEVICE — YANKAUER,BULB TIP,W/O VENT,RIGID,STERILE: Brand: MEDLINE

## (undated) DEVICE — SOLUTION IV IRRIG WATER 1000ML POUR BRL 2F7114

## (undated) DEVICE — STERILE COTTON BALLS LARGE 5/P: Brand: MEDLINE

## (undated) DEVICE — 60 ML SYRINGE LUER-LOCK TIP: Brand: MONOJECT

## (undated) DEVICE — GOWN,AURORA,NONREINFORCED,LARGE: Brand: MEDLINE

## (undated) DEVICE — SPONGE GZ W4XL4IN RAYON POLY FILL CVR W/ NONWOVEN FAB

## (undated) DEVICE — Z INACTIVE USE 2392665 BLADE LARYNGOSCOPE 4 GLIDESCOPE GVL STAT DISP

## (undated) DEVICE — COVER,MAYO STAND,STERILE: Brand: MEDLINE

## (undated) DEVICE — GAUZE,SPONGE,4"X4",16PLY,XRAY,STRL,LF: Brand: MEDLINE

## (undated) DEVICE — SUTURE MCRYL + SZ 4-0 L27IN ABSRB UD L19MM PS-2 3/8 CIR MCP426H

## (undated) DEVICE — KIT EVAC 100CC W10MMXL20CM SIL FULL PERF HUBLESS FLAT DRN

## (undated) DEVICE — ST CHARLES MINOR ABDOMINAL PK: Brand: MEDLINE INDUSTRIES, INC.

## (undated) DEVICE — SUTURE VCRL + SZ 3-0 L27IN ABSRB UD L26MM SH 1/2 CIR VCP416H

## (undated) DEVICE — PAD,NON-ADHERENT,3X8,STERILE,LF,1/PK: Brand: MEDLINE

## (undated) DEVICE — MERCY HEALTH ST CHARLES: Brand: MEDLINE INDUSTRIES, INC.

## (undated) DEVICE — CYSTO/BLADDER IRRIGATION SET, REGULATING CLAMP

## (undated) DEVICE — CONTAINER,SPECIMEN,4OZ,OR STRL: Brand: MEDLINE

## (undated) DEVICE — Device

## (undated) DEVICE — SINGLE PORT MANIFOLD: Brand: NEPTUNE 2

## (undated) DEVICE — 1200CC GUARDIAN II: Brand: GUARDIAN

## (undated) DEVICE — DRESSING TRNSPAR W5XL4.5IN FLM SHT SEMIPERMEABLE WIND

## (undated) DEVICE — GLOVE SURG 7 PF POLYMER COAT WHT STRL SIGN LTX ESSENTIAL LTX

## (undated) DEVICE — STRAP,POSITIONING,KNEE/BODY,FOAM,4X60": Brand: MEDLINE

## (undated) DEVICE — TOWEL,OR,DSP,ST,BLUE,STD,6/PK,12PK/CS: Brand: MEDLINE

## (undated) DEVICE — TUBE ET DIA7.5MM ORAL NSL CUF MURPHY EYE HI LO RADPQ LN

## (undated) DEVICE — STRAP,CATHETER,ELASTIC,HOOK&LOOP: Brand: MEDLINE

## (undated) DEVICE — SKIN AFFIX SURG ADHESIVE 72/CS 0.55ML: Brand: MEDLINE

## (undated) DEVICE — SOLUTION IV IRRIG POUR BRL 0.9% SODIUM CHL 2F7124

## (undated) DEVICE — SOLUTION IV 1000ML 0.9% SOD CHL PH 5 INJ USP VIAFLX PLAS

## (undated) DEVICE — HYPODERMIC SAFETY NEEDLE: Brand: MAGELLAN

## (undated) DEVICE — SEALER ENDOSCP NANO COAT OPN DIV CRV L JAW LIGASURE IMPACT

## (undated) DEVICE — DRAINBAG,ANTI-REFLUX TOWER,L/F,2000ML,LL: Brand: MEDLINE

## (undated) DEVICE — SOLUTION SCRB 4OZ 4% CHG H2O AIDED FOR PREOPERATIVE SKIN